# Patient Record
Sex: FEMALE | Race: WHITE | NOT HISPANIC OR LATINO | Employment: FULL TIME | URBAN - METROPOLITAN AREA
[De-identification: names, ages, dates, MRNs, and addresses within clinical notes are randomized per-mention and may not be internally consistent; named-entity substitution may affect disease eponyms.]

---

## 2022-08-30 ENCOUNTER — HOSPITAL ENCOUNTER (INPATIENT)
Facility: HOSPITAL | Age: 67
LOS: 6 days | Discharge: NURSING, REHAB OR LONG-TERM CARE | DRG: 064 | End: 2022-09-05
Attending: EMERGENCY MEDICINE | Admitting: INTERNAL MEDICINE
Payer: COMMERCIAL

## 2022-08-30 ENCOUNTER — APPOINTMENT (OUTPATIENT)
Dept: RADIOLOGY | Facility: HOSPITAL | Age: 67
DRG: 064 | End: 2022-08-30
Attending: EMERGENCY MEDICINE
Payer: COMMERCIAL

## 2022-08-30 ENCOUNTER — APPOINTMENT (OUTPATIENT)
Dept: CT IMAGING | Facility: HOSPITAL | Age: 67
DRG: 064 | End: 2022-08-30
Attending: STUDENT IN AN ORGANIZED HEALTH CARE EDUCATION/TRAINING PROGRAM
Payer: COMMERCIAL

## 2022-08-30 ENCOUNTER — APPOINTMENT (OUTPATIENT)
Dept: MRI IMAGING | Facility: HOSPITAL | Age: 67
DRG: 064 | End: 2022-08-30
Attending: EMERGENCY MEDICINE
Payer: COMMERCIAL

## 2022-08-30 DIAGNOSIS — I10 HYPERTENSION, UNSPECIFIED TYPE: Primary | ICD-10-CM

## 2022-08-30 DIAGNOSIS — N63.20 LEFT BREAST MASS: ICD-10-CM

## 2022-08-30 DIAGNOSIS — R29.90 STROKE-LIKE SYMPTOM: ICD-10-CM

## 2022-08-30 DIAGNOSIS — U07.1 INFECTION DUE TO 2019 NOVEL CORONAVIRUS: ICD-10-CM

## 2022-08-30 PROBLEM — I63.9 ACUTE ISCHEMIC STROKE (H): Status: ACTIVE | Noted: 2022-08-30

## 2022-08-30 LAB
ALBUMIN SERPL-MCNC: 4.5 G/DL (ref 3.5–5)
ALBUMIN UR-MCNC: 10 MG/DL
ALP SERPL-CCNC: 109 U/L (ref 45–120)
ALT SERPL W P-5'-P-CCNC: 24 U/L (ref 0–45)
ANION GAP SERPL CALCULATED.3IONS-SCNC: 11 MMOL/L (ref 5–18)
APPEARANCE UR: CLEAR
APTT PPP: 31 SECONDS (ref 22–38)
AST SERPL W P-5'-P-CCNC: 44 U/L (ref 0–40)
BACTERIA #/AREA URNS HPF: ABNORMAL /HPF
BASOPHILS # BLD AUTO: 0 10E3/UL (ref 0–0.2)
BASOPHILS NFR BLD AUTO: 0 %
BILIRUB DIRECT SERPL-MCNC: 0.1 MG/DL
BILIRUB SERPL-MCNC: 0.5 MG/DL (ref 0–1)
BILIRUB UR QL STRIP: NEGATIVE
BUN SERPL-MCNC: 13 MG/DL (ref 8–22)
CALCIUM SERPL-MCNC: 9.7 MG/DL (ref 8.5–10.5)
CHLORIDE BLD-SCNC: 103 MMOL/L (ref 98–107)
CHOLEST SERPL-MCNC: 281 MG/DL
CO2 SERPL-SCNC: 23 MMOL/L (ref 22–31)
COLOR UR AUTO: ABNORMAL
CREAT SERPL-MCNC: 1.01 MG/DL (ref 0.6–1.1)
EOSINOPHIL # BLD AUTO: 0 10E3/UL (ref 0–0.7)
EOSINOPHIL NFR BLD AUTO: 0 %
ERYTHROCYTE [DISTWIDTH] IN BLOOD BY AUTOMATED COUNT: 13.1 % (ref 10–15)
FLUAV RNA SPEC QL NAA+PROBE: NEGATIVE
FLUBV RNA RESP QL NAA+PROBE: NEGATIVE
GFR SERPL CREATININE-BSD FRML MDRD: 60 ML/MIN/1.73M2
GLUCOSE BLD-MCNC: 118 MG/DL (ref 70–125)
GLUCOSE BLDC GLUCOMTR-MCNC: 136 MG/DL (ref 70–99)
GLUCOSE UR STRIP-MCNC: NEGATIVE MG/DL
HBA1C MFR BLD: 5.7 %
HCT VFR BLD AUTO: 46 % (ref 35–47)
HDLC SERPL-MCNC: 48 MG/DL
HGB BLD-MCNC: 15.6 G/DL (ref 11.7–15.7)
HGB UR QL STRIP: ABNORMAL
HOLD SPECIMEN: NORMAL
IMM GRANULOCYTES # BLD: 0 10E3/UL
IMM GRANULOCYTES NFR BLD: 0 %
INR PPP: 1 (ref 0.85–1.15)
KETONES UR STRIP-MCNC: 10 MG/DL
LACTATE SERPL-SCNC: 1.2 MMOL/L (ref 0.7–2)
LDLC SERPL CALC-MCNC: 206 MG/DL
LEUKOCYTE ESTERASE UR QL STRIP: ABNORMAL
LYMPHOCYTES # BLD AUTO: 0.6 10E3/UL (ref 0.8–5.3)
LYMPHOCYTES NFR BLD AUTO: 6 %
MCH RBC QN AUTO: 28.9 PG (ref 26.5–33)
MCHC RBC AUTO-ENTMCNC: 33.9 G/DL (ref 31.5–36.5)
MCV RBC AUTO: 85 FL (ref 78–100)
MONOCYTES # BLD AUTO: 1.1 10E3/UL (ref 0–1.3)
MONOCYTES NFR BLD AUTO: 12 %
MUCOUS THREADS #/AREA URNS LPF: PRESENT /LPF
NEUTROPHILS # BLD AUTO: 7 10E3/UL (ref 1.6–8.3)
NEUTROPHILS NFR BLD AUTO: 82 %
NITRATE UR QL: NEGATIVE
NRBC # BLD AUTO: 0 10E3/UL
NRBC BLD AUTO-RTO: 0 /100
PH UR STRIP: 5.5 [PH] (ref 5–7)
PLATELET # BLD AUTO: 276 10E3/UL (ref 150–450)
POTASSIUM BLD-SCNC: 3.5 MMOL/L (ref 3.5–5)
PROT SERPL-MCNC: 7.8 G/DL (ref 6–8)
RBC # BLD AUTO: 5.4 10E6/UL (ref 3.8–5.2)
RBC URINE: 3 /HPF
RSV RNA SPEC NAA+PROBE: NEGATIVE
SARS-COV-2 RNA RESP QL NAA+PROBE: POSITIVE
SODIUM SERPL-SCNC: 137 MMOL/L (ref 136–145)
SP GR UR STRIP: 1.04 (ref 1–1.03)
SQUAMOUS EPITHELIAL: 1 /HPF
TRIGL SERPL-MCNC: 136 MG/DL
TROPONIN I SERPL-MCNC: 0.03 NG/ML (ref 0–0.29)
TSH SERPL DL<=0.005 MIU/L-ACNC: 0.77 UIU/ML (ref 0.3–5)
UROBILINOGEN UR STRIP-MCNC: <2 MG/DL
WBC # BLD AUTO: 8.7 10E3/UL (ref 4–11)
WBC URINE: 2 /HPF

## 2022-08-30 PROCEDURE — 36415 COLL VENOUS BLD VENIPUNCTURE: CPT | Performed by: EMERGENCY MEDICINE

## 2022-08-30 PROCEDURE — 85730 THROMBOPLASTIN TIME PARTIAL: CPT | Performed by: EMERGENCY MEDICINE

## 2022-08-30 PROCEDURE — 250N000011 HC RX IP 250 OP 636: Performed by: EMERGENCY MEDICINE

## 2022-08-30 PROCEDURE — 99285 EMERGENCY DEPT VISIT HI MDM: CPT | Mod: 25

## 2022-08-30 PROCEDURE — 83605 ASSAY OF LACTIC ACID: CPT | Performed by: EMERGENCY MEDICINE

## 2022-08-30 PROCEDURE — 99223 1ST HOSP IP/OBS HIGH 75: CPT | Mod: AI | Performed by: INTERNAL MEDICINE

## 2022-08-30 PROCEDURE — G0425 INPT/ED TELECONSULT30: HCPCS | Mod: G0 | Performed by: NURSE PRACTITIONER

## 2022-08-30 PROCEDURE — 250N000013 HC RX MED GY IP 250 OP 250 PS 637: Performed by: EMERGENCY MEDICINE

## 2022-08-30 PROCEDURE — 84484 ASSAY OF TROPONIN QUANT: CPT | Performed by: EMERGENCY MEDICINE

## 2022-08-30 PROCEDURE — 84443 ASSAY THYROID STIM HORMONE: CPT | Performed by: EMERGENCY MEDICINE

## 2022-08-30 PROCEDURE — 85610 PROTHROMBIN TIME: CPT | Performed by: EMERGENCY MEDICINE

## 2022-08-30 PROCEDURE — 93005 ELECTROCARDIOGRAM TRACING: CPT | Performed by: EMERGENCY MEDICINE

## 2022-08-30 PROCEDURE — 80053 COMPREHEN METABOLIC PANEL: CPT | Performed by: EMERGENCY MEDICINE

## 2022-08-30 PROCEDURE — C9803 HOPD COVID-19 SPEC COLLECT: HCPCS

## 2022-08-30 PROCEDURE — 80061 LIPID PANEL: CPT | Performed by: INTERNAL MEDICINE

## 2022-08-30 PROCEDURE — 81001 URINALYSIS AUTO W/SCOPE: CPT | Performed by: EMERGENCY MEDICINE

## 2022-08-30 PROCEDURE — 70496 CT ANGIOGRAPHY HEAD: CPT

## 2022-08-30 PROCEDURE — 255N000002 HC RX 255 OP 636: Performed by: EMERGENCY MEDICINE

## 2022-08-30 PROCEDURE — 71045 X-RAY EXAM CHEST 1 VIEW: CPT

## 2022-08-30 PROCEDURE — 83036 HEMOGLOBIN GLYCOSYLATED A1C: CPT | Performed by: INTERNAL MEDICINE

## 2022-08-30 PROCEDURE — 70498 CT ANGIOGRAPHY NECK: CPT

## 2022-08-30 PROCEDURE — 120N000001 HC R&B MED SURG/OB

## 2022-08-30 PROCEDURE — 70553 MRI BRAIN STEM W/O & W/DYE: CPT

## 2022-08-30 PROCEDURE — 85025 COMPLETE CBC W/AUTO DIFF WBC: CPT | Performed by: EMERGENCY MEDICINE

## 2022-08-30 PROCEDURE — 87637 SARSCOV2&INF A&B&RSV AMP PRB: CPT | Performed by: EMERGENCY MEDICINE

## 2022-08-30 PROCEDURE — 82248 BILIRUBIN DIRECT: CPT | Performed by: INTERNAL MEDICINE

## 2022-08-30 PROCEDURE — A9585 GADOBUTROL INJECTION: HCPCS | Performed by: EMERGENCY MEDICINE

## 2022-08-30 RX ORDER — GADOBUTROL 604.72 MG/ML
7 INJECTION INTRAVENOUS ONCE
Status: COMPLETED | OUTPATIENT
Start: 2022-08-30 | End: 2022-08-30

## 2022-08-30 RX ORDER — ACETAMINOPHEN 650 MG/1
650 SUPPOSITORY RECTAL EVERY 4 HOURS PRN
Status: DISCONTINUED | OUTPATIENT
Start: 2022-08-30 | End: 2022-08-31

## 2022-08-30 RX ORDER — LABETALOL HYDROCHLORIDE 5 MG/ML
10 INJECTION, SOLUTION INTRAVENOUS ONCE
Status: DISCONTINUED | OUTPATIENT
Start: 2022-08-30 | End: 2022-09-05 | Stop reason: HOSPADM

## 2022-08-30 RX ORDER — LABETALOL HYDROCHLORIDE 5 MG/ML
10-20 INJECTION, SOLUTION INTRAVENOUS EVERY 10 MIN PRN
Status: DISCONTINUED | OUTPATIENT
Start: 2022-08-30 | End: 2022-09-05 | Stop reason: HOSPADM

## 2022-08-30 RX ORDER — IOPAMIDOL 755 MG/ML
75 INJECTION, SOLUTION INTRAVASCULAR ONCE
Status: COMPLETED | OUTPATIENT
Start: 2022-08-30 | End: 2022-08-30

## 2022-08-30 RX ORDER — HEPARIN SODIUM 5000 [USP'U]/.5ML
5000 INJECTION, SOLUTION INTRAVENOUS; SUBCUTANEOUS EVERY 12 HOURS
Status: DISCONTINUED | OUTPATIENT
Start: 2022-08-31 | End: 2022-09-05 | Stop reason: HOSPADM

## 2022-08-30 RX ORDER — HYDRALAZINE HYDROCHLORIDE 20 MG/ML
10-20 INJECTION INTRAMUSCULAR; INTRAVENOUS
Status: DISCONTINUED | OUTPATIENT
Start: 2022-08-30 | End: 2022-09-05 | Stop reason: HOSPADM

## 2022-08-30 RX ORDER — CLOPIDOGREL BISULFATE 75 MG/1
300 TABLET ORAL ONCE
Status: COMPLETED | OUTPATIENT
Start: 2022-08-30 | End: 2022-08-30

## 2022-08-30 RX ORDER — ATORVASTATIN CALCIUM 40 MG/1
80 TABLET, FILM COATED ORAL EVERY EVENING
Status: DISCONTINUED | OUTPATIENT
Start: 2022-08-30 | End: 2022-09-05 | Stop reason: HOSPADM

## 2022-08-30 RX ORDER — ASPIRIN 81 MG/1
81 TABLET ORAL DAILY
Status: DISCONTINUED | OUTPATIENT
Start: 2022-08-31 | End: 2022-09-05 | Stop reason: HOSPADM

## 2022-08-30 RX ORDER — LORAZEPAM 2 MG/ML
1 INJECTION INTRAMUSCULAR ONCE
Status: COMPLETED | OUTPATIENT
Start: 2022-08-30 | End: 2022-08-30

## 2022-08-30 RX ORDER — ASPIRIN 325 MG
325 TABLET ORAL ONCE
Status: COMPLETED | OUTPATIENT
Start: 2022-08-30 | End: 2022-08-30

## 2022-08-30 RX ORDER — CLOPIDOGREL BISULFATE 75 MG/1
75 TABLET ORAL DAILY
Status: DISCONTINUED | OUTPATIENT
Start: 2022-08-31 | End: 2022-09-05 | Stop reason: HOSPADM

## 2022-08-30 RX ADMIN — IOPAMIDOL 75 ML: 755 INJECTION, SOLUTION INTRAVENOUS at 14:24

## 2022-08-30 RX ADMIN — LORAZEPAM 1 MG: 2 INJECTION INTRAMUSCULAR; INTRAVENOUS at 19:24

## 2022-08-30 RX ADMIN — CLOPIDOGREL BISULFATE 300 MG: 75 TABLET ORAL at 15:46

## 2022-08-30 RX ADMIN — ASPIRIN 325 MG: 325 TABLET ORAL at 15:53

## 2022-08-30 RX ADMIN — GADOBUTROL 7 ML: 604.72 INJECTION INTRAVENOUS at 20:48

## 2022-08-30 ASSESSMENT — ENCOUNTER SYMPTOMS
SHORTNESS OF BREATH: 0
DIARRHEA: 0
FATIGUE: 1
SORE THROAT: 0
COUGH: 0
NUMBNESS: 1
SPEECH DIFFICULTY: 1
VOMITING: 0
NAUSEA: 1
FEVER: 1
CONFUSION: 0
HEADACHES: 0
WOUND: 0
ABDOMINAL PAIN: 0
WEAKNESS: 1
NECK PAIN: 0

## 2022-08-30 ASSESSMENT — ACTIVITIES OF DAILY LIVING (ADL)
ADLS_ACUITY_SCORE: 35

## 2022-08-30 NOTE — ED TRIAGE NOTES
"Patient presents with new speech issues, including slurred speech and word finding issues, and increased balance issues this morning starting around 0900. Patient reports history of vertigo and has baseline balance and dizziness issues that were present yesterday as well as not \"feeling well\" yesterday. Also reports left hand tingling. Not on blood thinners. Patient did display left upper and lower extremity weakness with provider examination.     "

## 2022-08-30 NOTE — ED PROVIDER NOTES
EMERGENCY DEPARTMENT ENCOUNTER      NAME: Stephani Garcia  AGE: 67 year old female  YOB: 1955  MRN: [id number]  EVALUATION DATE & TIME: No admission date for patient encounter.    PCP: No primary care provider on file.    ED PROVIDER: Ramses Beltre MD        Chief Complaint   Patient presents with     Stroke Symptoms     Tier 2 Stroke Code         FINAL IMPRESSION:  1. Stroke-like symptom    2. Infection due to 2019 novel coronavirus    3. Left breast mass          ED COURSE & MEDICAL DECISION MAKING:    Pertinent Labs & Imaging studies reviewed. (See chart for details)  67 year old female presents to the Emergency Department for evaluation of strokelike symptoms        ED Course as of 08/30/22 2229   Tue Aug 30, 2022   1707 Patient presents with strokelike symptoms.  Also considered seizure, hypoglycemia, hyponatremia, conversion, metabolic encephalopathy   1707 Tier 2 stroke code.  Unclear on exact timing but sound like symptoms might of started last night and are worse this morning.  Not a tenecteplase candidate.   1708 Spoke with stroke neuro and neuroradiology.   1708 Stroke code canceled.  Plan for admission for likely stroke.  Aspirin and Plavix load.   1708 Sinus tachycardia.   1708 Sepsis unlikely.  We will add lactic acid on.  Chest x-ray negative.  No chest pain or shortness of breath suggest PE.   1708 WBC: 8.7   1708 TSH added on   1708 Spoke with hospitalist who agreed to admit to stroke neuro   1708 Sodium: 137   1708 Troponin I: 0.03   1726 I updated patient's son in the lobby.   1726 Nursing reports passed bedside swallow.  Regular diet ordered   1726 Lactic Acid: 1.2   1814 I updated charge nurse and patient's son in the lobby that patient has COVID-19   1814 Nursing states patient is requesting anxiety medicine prior to MRI, ativan ordered   2228 Left purple breast mass noted.  Discussed with patient states been present x4 months.     2:09 PM Tier 2 stroke code called in triage, so I  met with the patient in the waiting room to gather initial history and to perform my initial exam. I discussed the plan for care while in the Emergency Department. PPE (N-95 mask, gloves, and face shield) was worn during patient encounters.   2:27 PM I spoke with stroke neurology, and we discussed patient case.  2:40 PM I spoke with neurology radiology regarding the patient.  2:54 PM I spoke with stroke neurology who recommend 300 mg of Plavix, 325 mg of Asprin, and stroke neuro- plavix 300, 325 asprin, admission, and MRI to be performed as inpatient.  2:57 PM I returned to the patient to continue gathering HPI and complete further physical exam.  4:36 PM I updated the patient on lab and imaging results.  4:43 PM I discussed the case with hospitalist, Dr. Hamiltno, who accepts the patient for admission.      At the conclusion of the encounter I discussed the results of all of the tests and the disposition. The questions were answered. The patient or family acknowledged understanding and was agreeable with the care plan.       Critical Care  Performed by: Caleb Beltre MD  Total critical care time: 30 minutes  Critical care time was exclusive of separately billable procedures and treating other patients.  Critical care was necessary to treat or prevent imminent or life-threatening deterioration of the following conditions: stroke code  Critical care was time spent personally by me on the following activities: development of treatment plan with patient or surrogate, discussions with consultants, examination of patient, evaluation of patient's response to treatment, obtaining history from patient or surrogate, ordering and performing treatments and interventions, ordering and review of laboratory studies, ordering and review of radiographic studies and re-evaluation of patient's condition, this excludes any separately billable procedures.      MEDICATIONS GIVEN IN THE EMERGENCY:  Medications   labetalol  "(NORMODYNE/TRANDATE) injection 10 mg (has no administration in time range)   iopamidol (ISOVUE-370) solution 75 mL (75 mLs Intravenous Given 8/30/22 1424)   aspirin (ASA) tablet 325 mg (325 mg Oral Given 8/30/22 1553)   clopidogrel (PLAVIX) tablet 300 mg (300 mg Oral Given 8/30/22 1546)   LORazepam (ATIVAN) injection 1 mg (1 mg Intravenous Given 8/30/22 1924)   gadobutrol (GADAVIST) injection 7 mL (7 mLs Intravenous Given 8/30/22 2048)       NEW PRESCRIPTIONS STARTED AT TODAY'S ER VISIT  New Prescriptions    No medications on file          =================================================================    HPI    Triage note  \"Patient presents with new speech issues, including slurred speech and word finding issues, and increased balance issues this morning starting around 0900. Patient reports history of vertigo and has baseline balance and dizziness issues that were present yesterday as well as not \"feeling well\" yesterday. Also reports left hand tingling. Not on blood thinners.     \"      Patient information was obtained from: patient and patient's son    Use of : N/A     History limited secondary to patient having a difficult time articulating history    Stephani Garcia is a 67 year old female with a pertinent history of vertigo, hyperlipidemia, and HTN who presents to this ED by private vehicle with son for evaluation of left-sided weakness. Patient reports that she started feeling \"off\" yesterday ~1600. Patient states that she was feeling tired and slightly clumsy yesterday evening. Patient went to sleep at ~2200, and woke up this morning with left-sided weakness and numbness. When patient woke up this morning, at ~0900 (5.5 hours ago) her  noticed that she had slurred speech and difficulty formulating her thoughts into sentences.     Patient also reports that she was recent ly sick for ~3 days. She states that she was fatigued, nauseated, had nasal congestion, and felt feverish. " "    Patient denies history of smoking or daily alcohol use. Patient denies use of blood thinners. Patient denies bloody stools, abdominal pain, chest pain, shortness of breath, sore throat, cough, emesis, diarrhea, headache, blurred vision, chest pain, shortness of breath, or additional medical concerns or complaints at this time.     Of note, patient reports that she is left-hand dominant.      REVIEW OF SYSTEMS   Review of Systems   Constitutional: Positive for fatigue and fever (subjective, resolved).   HENT: Positive for congestion. Negative for sore throat.    Eyes: Negative for visual disturbance.   Respiratory: Negative for cough and shortness of breath.    Cardiovascular: Negative for chest pain.   Gastrointestinal: Positive for nausea (resolved). Negative for abdominal pain, diarrhea and vomiting.   Musculoskeletal: Negative for neck pain.   Skin: Negative for wound.   Neurological: Positive for speech difficulty, weakness (left sided) and numbness (left sided). Negative for headaches.        Positive for increased clumsiness    Psychiatric/Behavioral: Negative for confusion.       PAST MEDICAL HISTORY:  Past Medical History:   Diagnosis Date     Hyperlipidemia 2009     Hypertension 2009     GILDA (obstructive sleep apnea) 2011    Formatting of this note might be different from the original. Split-Night Polysomnogram Interpretation  Date of read:  2011  Estimated Body mass index is 26.94 kg/(m^2) as calculated from the following:   Height as of 11: 5' 7\"(1.702 m).   Weight as of 11: 172 lb(78.019 kg). Total [place] Score: (not recorded) Neck Circumference (in inches): 13   Baseline: This study was performed in       PAST SURGICAL HISTORY:  Past Surgical History:   Procedure Laterality Date      SECTION             CURRENT MEDICATIONS:    No current outpatient medications on file.      ALLERGIES:  Allergies   Allergen Reactions     Sulfa Drugs Headache     Tetracyclines " Hives     Occurred many years ago.        FAMILY HISTORY:  Family History   Problem Relation Age of Onset     Ovarian Cancer Mother      Lung Cancer Mother      Cataracts Father        SOCIAL HISTORY:   Social History     Socioeconomic History     Marital status:        VITALS:  BP (!) 216/100   Pulse 117   Temp 98.6  F (37  C) (Oral)   Resp 20   SpO2 96%     PHYSICAL EXAM      Vitals: BP (!) 216/100   Pulse 117   Temp 98.6  F (37  C) (Oral)   Resp 20   SpO2 96%   General: Appears in no acute distress, awake, alert, interactive.  Eyes: Conjunctivae non-injected. Sclera anicteric. Strabismus of left eye.  HENT: Atraumatic.  Neck: Supple.  Respiratory/Chest: Respiration unlabored.  Decreased breath sounds on left side.  Heart: tachycardia  Abdomen: non distended  Musculoskeletal: Normal extremities. No edema or erythema.  Skin: Normal color. No rash or diaphoresis.  Neurologic: Face symmetric, moves all extremities spontaneously.  Slightly slurred speech, weakness to the left arm and left leg.  Psychiatric: Oriented to person, place, and time. Affect appropriate.       LAB:  All pertinent labs reviewed and interpreted.  Results for orders placed or performed during the hospital encounter of 08/30/22   CTA Head Neck with Contrast    Impression    IMPRESSION:    HEAD CT:  1. No acute intracranial abnormality.    HEAD CTA:  1. Variant anatomy, otherwise unremarkable intracranial vasculature.    NECK CTA:  1. 30% stenosis of the right ICA origin.    - - - - - - - - - - - - - - - - - - - - - - - - - - - - - - - - - - - - - - - - - - - - - - - - - - - - - - - - - - - - - - - - - - - - - - - - - - - -   The results above were discussed with Dr. Beltre on 8/30/2022 2:39 PM by Dr. Jabari Sams.  - - - - - - - - - - - - - - - - - - - - - - - - - - - - - - - - - - - - - - - - - - - - - - - - - - - - - - - - - - - - - - - - - - - - - - - - - - - -    XR Chest Port 1 View    Impression    IMPRESSION: Lungs are  clear. Heart and pulmonary vascularity are normal. No signs of acute disease.   MR Brain w/o & w Contrast    Impression    IMPRESSION:  1.  Cluster of small recent ischemic infarcts involving the deep white matter of the right frontal lobe and right basal ganglia.  2.  No other recent infarcts.  3.  Age-related changes.   Basic metabolic panel   Result Value Ref Range    Sodium 137 136 - 145 mmol/L    Potassium 3.5 3.5 - 5.0 mmol/L    Chloride 103 98 - 107 mmol/L    Carbon Dioxide (CO2) 23 22 - 31 mmol/L    Anion Gap 11 5 - 18 mmol/L    Urea Nitrogen 13 8 - 22 mg/dL    Creatinine 1.01 0.60 - 1.10 mg/dL    Calcium 9.7 8.5 - 10.5 mg/dL    Glucose 118 70 - 125 mg/dL    GFR Estimate 60 (L) >60 mL/min/1.73m2   Result Value Ref Range    INR 1.00 0.85 - 1.15   Partial thromboplastin time   Result Value Ref Range    aPTT 31 22 - 38 Seconds   Result Value Ref Range    Troponin I 0.03 0.00 - 0.29 ng/mL   Glucose by meter   Result Value Ref Range    GLUCOSE BY METER POCT 136 (H) 70 - 99 mg/dL   Symptomatic; Unknown Influenza A/B & SARS-CoV2 (COVID-19) Virus PCR Multiplex Nasopharyngeal    Specimen: Nasopharyngeal; Swab   Result Value Ref Range    Influenza A PCR Negative Negative    Influenza B PCR Negative Negative    RSV PCR Negative Negative    SARS CoV2 PCR Positive (A) Negative   CBC with platelets and differential   Result Value Ref Range    WBC Count 8.7 4.0 - 11.0 10e3/uL    RBC Count 5.40 (H) 3.80 - 5.20 10e6/uL    Hemoglobin 15.6 11.7 - 15.7 g/dL    Hematocrit 46.0 35.0 - 47.0 %    MCV 85 78 - 100 fL    MCH 28.9 26.5 - 33.0 pg    MCHC 33.9 31.5 - 36.5 g/dL    RDW 13.1 10.0 - 15.0 %    Platelet Count 276 150 - 450 10e3/uL    % Neutrophils 82 %    % Lymphocytes 6 %    % Monocytes 12 %    % Eosinophils 0 %    % Basophils 0 %    % Immature Granulocytes 0 %    NRBCs per 100 WBC 0 <1 /100    Absolute Neutrophils 7.0 1.6 - 8.3 10e3/uL    Absolute Lymphocytes 0.6 (L) 0.8 - 5.3 10e3/uL    Absolute Monocytes 1.1 0.0 - 1.3  10e3/uL    Absolute Eosinophils 0.0 0.0 - 0.7 10e3/uL    Absolute Basophils 0.0 0.0 - 0.2 10e3/uL    Absolute Immature Granulocytes 0.0 <=0.4 10e3/uL    Absolute NRBCs 0.0 10e3/uL   Extra Red Top Tube   Result Value Ref Range    Hold Specimen JIC    Lactic acid whole blood   Result Value Ref Range    Lactic Acid 1.2 0.7 - 2.0 mmol/L   TSH with free T4 reflex   Result Value Ref Range    TSH 0.77 0.30 - 5.00 uIU/mL   UA with Microscopic reflex to Culture    Specimen: Urine, NOS   Result Value Ref Range    Color Urine Light Yellow Colorless, Straw, Light Yellow, Yellow    Appearance Urine Clear Clear    Glucose Urine Negative Negative mg/dL    Bilirubin Urine Negative Negative    Ketones Urine 10  (A) Negative mg/dL    Specific Gravity Urine 1.044 (H) 1.001 - 1.030    Blood Urine 0.03 mg/dL (A) Negative    pH Urine 5.5 5.0 - 7.0    Protein Albumin Urine 10  (A) Negative mg/dL    Urobilinogen Urine <2.0 <2.0 mg/dL    Nitrite Urine Negative Negative    Leukocyte Esterase Urine 25 Alexia/uL (A) Negative    Bacteria Urine Few (A) None Seen /HPF    Mucus Urine Present (A) None Seen /LPF    RBC Urine 3 (H) <=2 /HPF    WBC Urine 2 <=5 /HPF    Squamous Epithelials Urine 1 <=1 /HPF       RADIOLOGY:  Reviewed all pertinent imaging. Please see official radiology report.  MR Brain w/o & w Contrast   Final Result   IMPRESSION:   1.  Cluster of small recent ischemic infarcts involving the deep white matter of the right frontal lobe and right basal ganglia.   2.  No other recent infarcts.   3.  Age-related changes.      XR Chest Port 1 View   Final Result   IMPRESSION: Lungs are clear. Heart and pulmonary vascularity are normal. No signs of acute disease.      CTA Head Neck with Contrast   Final Result   IMPRESSION:      HEAD CT:   1. No acute intracranial abnormality.      HEAD CTA:   1. Variant anatomy, otherwise unremarkable intracranial vasculature.      NECK CTA:   1. 30% stenosis of the right ICA origin.      - - - - - - - - - - -  - - - - - - - - - - - - - - - - - - - - - - - - - - - - - - - - - - - - - - - - - - - - - - - - - - - - - - - - - - - - - - - - -    The results above were discussed with Dr. Beltre on 8/30/2022 2:39 PM by Dr. Jabari Sams.   - - - - - - - - - - - - - - - - - - - - - - - - - - - - - - - - - - - - - - - - - - - - - - - - - - - - - - - - - - - - - - - - - - - - - - - - - - - -           EKG:    Performed at: 1630    Impression: Possible left atrial enlargement.     Rate: 110  Rhythm: Sinus tachycardia.  Axis: 17  MD Interval: 152  QRS Interval: 90  QTc Interval: 473  ST Changes: None  Comparison: No previous EKG for comparison    I have independently reviewed and interpreted the EKG(s) documented above.    PROCEDURES:   none      I, Elise Gryler, am serving as a scribe to document services personally performed by Caleb Beltre MD based on my observation and the provider's statements to me. I, Dr. Caleb Beltre, attest that Elise Gryler is acting in a scribe capacity, has observed my performance of the services and has documented them in accordance with my direction.    Caleb Beltre MD  Emergency Medicine  [facility] EMERGENCY DEPARTMENT  [address] [place] [id number] [phone]       Caleb Beltre MD  08/30/22 1727       Caleb Beltre MD  08/30/22 181       Caleb Beltre MD  08/30/22 2126       Caleb Beltre MD  08/30/22 6434

## 2022-08-30 NOTE — ED PROVIDER NOTES
EMERGENCY DEPARTMENT ENCOUNTER      NAME: Stephani Garcia  AGE: 67 year old female  YOB: 1955  MRN: [id number]  EVALUATION DATE & TIME: No admission date for patient encounter.    PCP: No primary care provider on file.    ED PROVIDER: Ramses Beltre MD        Chief Complaint   Patient presents with     Stroke Symptoms     Tier 2 Stroke Code         FINAL IMPRESSION:  1. Stroke-like symptom    2. Infection due to 2019 novel coronavirus          ED COURSE & MEDICAL DECISION MAKING:    Pertinent Labs & Imaging studies reviewed. (See chart for details)  67 year old female presents to the Emergency Department for evaluation of strokelike symptoms        ED Course as of 08/30/22 2129   Tue Aug 30, 2022   1707 Patient presents with strokelike symptoms.  Also considered seizure, hypoglycemia, hyponatremia, conversion, metabolic encephalopathy   1707 Tier 2 stroke code.  Unclear on exact timing but sound like symptoms might of started last night and are worse this morning.  Not a tenecteplase candidate.   1708 Spoke with stroke neuro and neuroradiology.   1708 Stroke code canceled.  Plan for admission for likely stroke.  Aspirin and Plavix load.   1708 Sinus tachycardia.   1708 Sepsis unlikely.  We will add lactic acid on.  Chest x-ray negative.  No chest pain or shortness of breath suggest PE.   1708 WBC: 8.7   1708 TSH added on   1708 Spoke with hospitalist who agreed to admit to stroke neuro   1708 Sodium: 137   1708 Troponin I: 0.03   1726 I updated patient's son in the lobby.   1726 Nursing reports passed bedside swallow.  Regular diet ordered   1726 Lactic Acid: 1.2   1814 I updated charge nurse and patient's son in the lobby that patient has COVID-19   1814 Nursing states patient is requesting anxiety medicine prior to MRI, ativan ordered     2:09 PM Tier 2 stroke code called in triage, so I met with the patient in the waiting room to gather initial history and to perform my initial exam. I discussed the  plan for care while in the Emergency Department. PPE (N-95 mask, gloves, and face shield) was worn during patient encounters.   2:27 PM I spoke with stroke neurology, and we discussed patient case.  2:40 PM I spoke with neurology radiology regarding the patient.  2:54 PM I spoke with stroke neurology who recommend 300 mg of Plavix, 325 mg of Asprin, and stroke neuro- plavix 300, 325 asprin, admission, and MRI to be performed as inpatient.  2:57 PM I returned to the patient to continue gathering HPI and complete further physical exam.  4:36 PM I updated the patient on lab and imaging results.  4:43 PM I discussed the case with hospitalist, Dr. Hamilton, who accepts the patient for admission.      At the conclusion of the encounter I discussed the results of all of the tests and the disposition. The questions were answered. The patient or family acknowledged understanding and was agreeable with the care plan.       Critical Care  Performed by: Caleb Beltre MD  Total critical care time: 30 minutes  Critical care time was exclusive of separately billable procedures and treating other patients.  Critical care was necessary to treat or prevent imminent or life-threatening deterioration of the following conditions: stroke code  Critical care was time spent personally by me on the following activities: development of treatment plan with patient or surrogate, discussions with consultants, examination of patient, evaluation of patient's response to treatment, obtaining history from patient or surrogate, ordering and performing treatments and interventions, ordering and review of laboratory studies, ordering and review of radiographic studies and re-evaluation of patient's condition, this excludes any separately billable procedures.      MEDICATIONS GIVEN IN THE EMERGENCY:  Medications   iopamidol (ISOVUE-370) solution 75 mL (75 mLs Intravenous Given 8/30/22 2422)   aspirin (ASA) tablet 325 mg (325 mg Oral Given 8/30/22 4273)  "  clopidogrel (PLAVIX) tablet 300 mg (300 mg Oral Given 8/30/22 1546)   LORazepam (ATIVAN) injection 1 mg (1 mg Intravenous Given 8/30/22 1924)   gadobutrol (GADAVIST) injection 7 mL (7 mLs Intravenous Given 8/30/22 2048)       NEW PRESCRIPTIONS STARTED AT TODAY'S ER VISIT  New Prescriptions    No medications on file          =================================================================    HPI    Triage note  \"Patient presents with new speech issues, including slurred speech and word finding issues, and increased balance issues this morning starting around 0900. Patient reports history of vertigo and has baseline balance and dizziness issues that were present yesterday as well as not \"feeling well\" yesterday. Also reports left hand tingling. Not on blood thinners.     \"      Patient information was obtained from: patient and patient's son    Use of : N/A     History limited secondary to patient having a difficult time articulating history    Stephani Garcia is a 67 year old female with a pertinent history of vertigo, hyperlipidemia, and HTN who presents to this ED by private vehicle with son for evaluation of left-sided weakness. Patient reports that she started feeling \"off\" yesterday ~1600. Patient states that she was feeling tired and slightly clumsy yesterday evening. Patient went to sleep at ~2200, and woke up this morning with left-sided weakness and numbness. When patient woke up this morning, at ~0900 (5.5 hours ago) her  noticed that she had slurred speech and difficulty formulating her thoughts into sentences.     Patient also reports that she was recent ly sick for ~3 days. She states that she was fatigued, nauseated, had nasal congestion, and felt feverish.     Patient denies history of smoking or daily alcohol use. Patient denies use of blood thinners. Patient denies bloody stools, abdominal pain, chest pain, shortness of breath, sore throat, cough, emesis, diarrhea, headache, " "blurred vision, chest pain, shortness of breath, or additional medical concerns or complaints at this time.     Of note, patient reports that she is left-hand dominant.      REVIEW OF SYSTEMS   Review of Systems   Constitutional: Positive for fatigue and fever (subjective, resolved).   HENT: Positive for congestion. Negative for sore throat.    Eyes: Negative for visual disturbance.   Respiratory: Negative for cough and shortness of breath.    Cardiovascular: Negative for chest pain.   Gastrointestinal: Positive for nausea (resolved). Negative for abdominal pain, diarrhea and vomiting.   Musculoskeletal: Negative for neck pain.   Skin: Negative for wound.   Neurological: Positive for speech difficulty, weakness (left sided) and numbness (left sided). Negative for headaches.        Positive for increased clumsiness    Psychiatric/Behavioral: Negative for confusion.       PAST MEDICAL HISTORY:  Past Medical History:   Diagnosis Date     Hyperlipidemia 2009     Hypertension 2009     GILDA (obstructive sleep apnea) 2011    Formatting of this note might be different from the original. Split-Night Polysomnogram Interpretation  Date of read:  2011  Estimated Body mass index is 26.94 kg/(m^2) as calculated from the following:   Height as of 11: 5' 7\"(1.702 m).   Weight as of 11: 172 lb(78.019 kg). Total Chaffee Score: (not recorded) Neck Circumference (in inches): 13   Baseline: This study was performed in       PAST SURGICAL HISTORY:  Past Surgical History:   Procedure Laterality Date      SECTION             CURRENT MEDICATIONS:    No current outpatient medications on file.      ALLERGIES:  Allergies   Allergen Reactions     Sulfa Drugs Headache     Tetracyclines Hives     Occurred many years ago.        FAMILY HISTORY:  Family History   Problem Relation Age of Onset     Ovarian Cancer Mother      Lung Cancer Mother      Cataracts Father        SOCIAL HISTORY:   Social History "     Socioeconomic History     Marital status:        VITALS:  BP (!) 216/100   Pulse 117   Temp 98.6  F (37  C) (Oral)   Resp 20   SpO2 96%     PHYSICAL EXAM      Vitals: BP (!) 216/100   Pulse 117   Temp 98.6  F (37  C) (Oral)   Resp 20   SpO2 96%   General: Appears in no acute distress, awake, alert, interactive.  Eyes: Conjunctivae non-injected. Sclera anicteric. Strabismus of left eye.  HENT: Atraumatic.  Neck: Supple.  Respiratory/Chest: Respiration unlabored.  Decreased breath sounds on left side.  Heart: tachycardia  Abdomen: non distended  Musculoskeletal: Normal extremities. No edema or erythema.  Skin: Normal color. No rash or diaphoresis.  Neurologic: Face symmetric, moves all extremities spontaneously.  Slightly slurred speech, weakness to the left arm and left leg.  Psychiatric: Oriented to person, place, and time. Affect appropriate.       LAB:  All pertinent labs reviewed and interpreted.  Results for orders placed or performed during the hospital encounter of 08/30/22   CTA Head Neck with Contrast    Impression    IMPRESSION:    HEAD CT:  1. No acute intracranial abnormality.    HEAD CTA:  1. Variant anatomy, otherwise unremarkable intracranial vasculature.    NECK CTA:  1. 30% stenosis of the right ICA origin.    - - - - - - - - - - - - - - - - - - - - - - - - - - - - - - - - - - - - - - - - - - - - - - - - - - - - - - - - - - - - - - - - - - - - - - - - - - - -   The results above were discussed with Dr. Beltre on 8/30/2022 2:39 PM by Dr. Jabari Sams.  - - - - - - - - - - - - - - - - - - - - - - - - - - - - - - - - - - - - - - - - - - - - - - - - - - - - - - - - - - - - - - - - - - - - - - - - - - - -    XR Chest Port 1 View    Impression    IMPRESSION: Lungs are clear. Heart and pulmonary vascularity are normal. No signs of acute disease.   MR Brain w/o & w Contrast    Impression    IMPRESSION:  1.  Cluster of small recent ischemic infarcts involving the deep white matter of the  right frontal lobe and right basal ganglia.  2.  No other recent infarcts.  3.  Age-related changes.   Basic metabolic panel   Result Value Ref Range    Sodium 137 136 - 145 mmol/L    Potassium 3.5 3.5 - 5.0 mmol/L    Chloride 103 98 - 107 mmol/L    Carbon Dioxide (CO2) 23 22 - 31 mmol/L    Anion Gap 11 5 - 18 mmol/L    Urea Nitrogen 13 8 - 22 mg/dL    Creatinine 1.01 0.60 - 1.10 mg/dL    Calcium 9.7 8.5 - 10.5 mg/dL    Glucose 118 70 - 125 mg/dL    GFR Estimate 60 (L) >60 mL/min/1.73m2   Result Value Ref Range    INR 1.00 0.85 - 1.15   Partial thromboplastin time   Result Value Ref Range    aPTT 31 22 - 38 Seconds   Result Value Ref Range    Troponin I 0.03 0.00 - 0.29 ng/mL   Glucose by meter   Result Value Ref Range    GLUCOSE BY METER POCT 136 (H) 70 - 99 mg/dL   Symptomatic; Unknown Influenza A/B & SARS-CoV2 (COVID-19) Virus PCR Multiplex Nasopharyngeal    Specimen: Nasopharyngeal; Swab   Result Value Ref Range    Influenza A PCR Negative Negative    Influenza B PCR Negative Negative    RSV PCR Negative Negative    SARS CoV2 PCR Positive (A) Negative   CBC with platelets and differential   Result Value Ref Range    WBC Count 8.7 4.0 - 11.0 10e3/uL    RBC Count 5.40 (H) 3.80 - 5.20 10e6/uL    Hemoglobin 15.6 11.7 - 15.7 g/dL    Hematocrit 46.0 35.0 - 47.0 %    MCV 85 78 - 100 fL    MCH 28.9 26.5 - 33.0 pg    MCHC 33.9 31.5 - 36.5 g/dL    RDW 13.1 10.0 - 15.0 %    Platelet Count 276 150 - 450 10e3/uL    % Neutrophils 82 %    % Lymphocytes 6 %    % Monocytes 12 %    % Eosinophils 0 %    % Basophils 0 %    % Immature Granulocytes 0 %    NRBCs per 100 WBC 0 <1 /100    Absolute Neutrophils 7.0 1.6 - 8.3 10e3/uL    Absolute Lymphocytes 0.6 (L) 0.8 - 5.3 10e3/uL    Absolute Monocytes 1.1 0.0 - 1.3 10e3/uL    Absolute Eosinophils 0.0 0.0 - 0.7 10e3/uL    Absolute Basophils 0.0 0.0 - 0.2 10e3/uL    Absolute Immature Granulocytes 0.0 <=0.4 10e3/uL    Absolute NRBCs 0.0 10e3/uL   Extra Red Top Tube   Result Value Ref Range     Hold Specimen [facility]    Lactic acid whole blood   Result Value Ref Range    Lactic Acid 1.2 0.7 - 2.0 mmol/L   TSH with free T4 reflex   Result Value Ref Range    TSH 0.77 0.30 - 5.00 uIU/mL   UA with Microscopic reflex to Culture    Specimen: Urine, NOS   Result Value Ref Range    Color Urine Light Yellow Colorless, Straw, Light Yellow, Yellow    Appearance Urine Clear Clear    Glucose Urine Negative Negative mg/dL    Bilirubin Urine Negative Negative    Ketones Urine 10  (A) Negative mg/dL    Specific Gravity Urine 1.044 (H) 1.001 - 1.030    Blood Urine 0.03 mg/dL (A) Negative    pH Urine 5.5 5.0 - 7.0    Protein Albumin Urine 10  (A) Negative mg/dL    Urobilinogen Urine <2.0 <2.0 mg/dL    Nitrite Urine Negative Negative    Leukocyte Esterase Urine 25 Alexia/uL (A) Negative    Bacteria Urine Few (A) None Seen /HPF    Mucus Urine Present (A) None Seen /LPF    RBC Urine 3 (H) <=2 /HPF    WBC Urine 2 <=5 /HPF    Squamous Epithelials Urine 1 <=1 /HPF       RADIOLOGY:  Reviewed all pertinent imaging. Please see official radiology report.  MR Brain w/o & w Contrast   Final Result   IMPRESSION:   1.  Cluster of small recent ischemic infarcts involving the deep white matter of the right frontal lobe and right basal ganglia.   2.  No other recent infarcts.   3.  Age-related changes.      XR Chest Port 1 View   Final Result   IMPRESSION: Lungs are clear. Heart and pulmonary vascularity are normal. No signs of acute disease.      CTA Head Neck with Contrast   Final Result   IMPRESSION:      HEAD CT:   1. No acute intracranial abnormality.      HEAD CTA:   1. Variant anatomy, otherwise unremarkable intracranial vasculature.      NECK CTA:   1. 30% stenosis of the right ICA origin.      - - - - - - - - - - - - - - - - - - - - - - - - - - - - - - - - - - - - - - - - - - - - - - - - - - - - - - - - - - - - - - - - - - - - - - - - - - - -    The results above were discussed with Dr. Beltre on 8/30/2022 2:39 PM by Dr. Barboza  Garyaro.   - - - - - - - - - - - - - - - - - - - - - - - - - - - - - - - - - - - - - - - - - - - - - - - - - - - - - - - - - - - - - - - - - - - - - - - - - - - -           EKG:    Performed at: 1630    Impression: Possible left atrial enlargement.     Rate: 110  Rhythm: Sinus tachycardia.  Axis: 17  IL Interval: 152  QRS Interval: 90  QTc Interval: 473  ST Changes: None  Comparison: No previous EKG for comparison    I have independently reviewed and interpreted the EKG(s) documented above.    PROCEDURES:   none      I, Elise Gryler, am serving as a scribe to document services personally performed by Caleb Beltre MD based on my observation and the provider's statements to me. I, Dr. Caleb Beltre, attest that Elise Gryler is acting in a scribe capacity, has observed my performance of the services and has documented them in accordance with my direction.    Caleb Beltre MD  Emergency Medicine  [facility] EMERGENCY DEPARTMENT  [address] [place] [id number] [phone]       Caleb Beltre MD  08/30/22 2477       Caleb Beltre MD  08/30/22 9206

## 2022-08-30 NOTE — ED NOTES
ED Provider In Triage Note  [facility]  Encounter Date: Aug 30, 2022    No chief complaint on file.      Brief HPI:   Stephani Garcia is a 67 year old female, history of vertigo, presenting to the Emergency Department with a chief complaint of stroke symptoms. She awoke yesterday morning with balance difficulties and dizziness, which is not atypical for her, but seemed more intense than baseline. Today, ~9am, she had onset of slurring of her speech and word-finding difficulties; she awoke at 5:30 with normal speech.     No headache. No anticoagulants. Left hand paresthesias today. Denied extremity weakness to me - had told RN that helped her out of the car that she had left-sided weakness yesterday. No vision changes.    No chest pain or SOB.      Brief Physical Exam:  There were no vitals taken for this visit.  General: Non-toxic appearing  HEENT: Atraumatic  Resp: No respiratory distress  Abdomen: Non-peritoneal  Neuro: Alert, oriented, answers questions appropriate; cranial nerves grossly intact; 4++/5 strength LUE and LLE vs 5/5 strength RUE and RLE  Psych: Behavior appropriate      Plan Initiated in Triage:  Blood work  EKG    Brain imaging - difficult to discern time of onset of symptoms - either start with CTA head / neck vs brain MRI cow / carotid    PIT Dispo:   Return to lobby while awaiting workup and ED bed availability    Vicki Agudelo MD on 8/30/2022 at 2:00 PM    Patient was evaluated by the Physician in Triage due to a limitation of available rooms in the Emergency Department. A plan of care was discussed based on the information obtained on the initial evaluation and patient was consuled to return back to the Emergency Department lobby after this initial evalutaiton until results were obtained or a room became available in the Emergency Department. Patient was counseled not to leave prior to receiving the results of their workup.     MD AMY Mcrae  [facility] EMERGENCY DEPARTMENT  [address] [place] [id number]  [phone]       Vicki Agudelo MD  08/30/22 6324

## 2022-08-30 NOTE — CONSULTS
"[facility]    Stroke Consult Note    Reason for Consult: Stroke Code     Chief Complaint: Stroke Symptoms and Tier 2 Stroke Code      HPI  Stephani Garcia is a 67 year old female with past medical history significant for vertigo, HTN, and HLD. She was brought to the [facility] ED for evaluation of language impairment and left sided weakness. She reports that the weakness began on 8/29 in the afternoon. She did not notice the word finding difficulty until after she awoke this morning. She states that sometimes the correct words won't come out.      Imaging Findings  CTH negative for acute pathology  CTA with 30% R ICA stenosis, no LVO    Intravenous Thrombolysis  Not given due to:   - unclear or unfavorable risk-benefit profile for extended window thrombolysis beyond the conventional 4.5 hour time window    Endovascular Treatment  Not initiated due to absence of proximal vessel occlusion    Impression   Suspected acute ischemic stroke.     Recommendations  - Use orderset: \"Ischemic Stroke Routine Admission\" or \"Ischemic Stroke No Thrombolytics/No Thrombectomy ICU Admission\"  - Neurochecks and Vital Signs every 4 hours   - Permissive HTN; goal SBP < 220 mmHg  - Daily aspirin 81 mg for secondary stroke prevention  - Plavix (clopidogrel) 300 mg PO loading dose x 1  - Plavix (clopidogrel) 75 mg PO Daily  - Statin: high intensity   - MRI Brain with and without contrast  - TTE (with Bubble Study if age 60 yrs or less)  - Telemetry, EKG  - Bedside Glucose Monitoring  - A1c, Lipid Panel, Troponin x 3  - PT/OT/SLP  - Stroke Education  - Euthermia, Euglycemia       Bárbara Marshall, APRN, CNP  Vascular Neurology  To page me or covering stroke neurology team member, click here: AMCOM   Choose \"On Call\" tab at top, then search dropdown box for \"Neurology Adult\", select location, press Enter, then look for stroke/neuro " ICU/telestroke.    ______________________________________________________    Clinically Significant Risk Factors Present on Admission                     Past Medical History   History reviewed. No pertinent past medical history.  Past Surgical History   History reviewed. No pertinent surgical history.  Medications   Home Meds  Prior to Admission medications    Not on File       Scheduled Meds      Infusion Meds      PRN Meds      Allergies   Not on File  Family History   History reviewed. No pertinent family history.  Social History        Review of Systems   The 10 point Review of Systems is negative other than noted in the HPI or here.        PHYSICAL EXAMINATION  Temp:  [98.6  F (37  C)] 98.6  F (37  C)  Pulse:  [132] 132  Resp:  [20] 20  BP: (197)/(122) 197/122  SpO2:  [96 %] 96 %     Neuro Exam  Mental Status:  alert, oriented x 3, follows commands, intermittent word finding diffiuclty and mild dysarthria  Cranial Nerves:  visual fields intact (tested by nurse), EOMI with normal smooth pursuit, facial sensation intact and symmetric (tested by nurse), hearing not formally tested but intact to conversation, shoulder shrug equal bilaterally, tongue protrusion midline, L NLF flattening  Motor:  no abnormal movements, mild LUE drift, bilateral LE drift (L>R)  Reflexes:  unable to test (telestroke)  Sensory:  light touch sensation intact and symmetric throughout upper and lower extremities (assessed by nurse), no extinction on double simultaneous stimulation (assessed by nurse)  Coordination:  no ataxia out of proportion to weakness  Station/Gait:  unable to test due to telestroke    Dysphagia Screen  Dysarthria or facial droop present - Maintain NPO, consult SLP    Stroke Scales    NIHSS  1a. Level of Consciousness 0-->Alert, keenly responsive   1b. LOC Questions 0-->Answers both questions correctly   1c. LOC Commands 0-->Performs both tasks correctly   2.   Best Gaze 0-->Normal   3.   Visual 0-->No visual loss    4.   Facial Palsy 0-->Normal symmetrical movements   5a. Motor Arm, Left 1-->Drift, limb holds 90 (or 45) degrees, but drifts down before full 10 seconds, does not hit bed or other support   5b. Motor Arm, Right 0-->No drift, limb holds 90 (or 45) degrees for full 10 secs   6a. Motor Leg, Left 1-->Drift, leg falls by the end of the 5-sec period but does not hit bed   6b. Motor Leg, right 1-->Drift, leg falls by the end of the 5-sec period but does not hit bed   7.   Limb Ataxia 0-->Absent   8.   Sensory 0-->Normal, no sensory loss   9.   Best Language 1-->Mild-to-moderate aphasia, some obvious loss of fluency or facility of comprehension, without significant limitation on ideas expressed or form of expression. Reduction of speech and/or comprehension, however, makes conversation. . . (see row details)   10. Dysarthria 1-->Mild-to-moderate dysarthria, patient slurs at least some words and, at worst, can be understood with some difficulty   11. Extinction and Inattention  0-->No abnormality   Total 6 (08/30/22 1446)     Imaging  I personally reviewed all imaging; relevant findings per HPI.     Lab Results Data   CBC  No results for input(s): WBC, RBC, HGB, HCT, PLT in the last 168 hours.  Basic Metabolic Panel    Recent Labs   Lab 08/30/22  1409   *     Liver Panel  No results for input(s): PROTTOTAL, ALBUMIN, BILITOTAL, ALKPHOS, AST, ALT, BILIDIRECT in the last 168 hours.  INR  No lab results found.   Lipid Profile  No lab results found.  A1C  No lab results found.  Troponin  No results for input(s): CTROPT, TROPONINIS, TROPONINI, GHTROP in the last 168 hours.       Stroke Code Data Data   Stroke Code Data  (for stroke code with tele)  Stroke code activated 08/30/22   1410   First stroke provider response 08/30/22   1427 (delay due to multiple codes)   Video start time 08/30/22   1439   Video end time 08/30/22   1450   Last known normal 08/29/22       Time of discovery  (or onset of symptoms)  08/30/22    0900   Head CT read by Stroke Neuro Dr/Provider 08/30/22   1429   Was stroke code de-escalated? Yes 08/30/22 7513           Telestroke Service Details  Type of service telemedicine diagnostic assessment of acute neurological changes   Reason telemedicine is appropriate patient requires assessment with a specialist for diagnosis and treatment of neurological symptoms   Mode of transmission secure interactive audio and video communication per Khloe   Originating site (patient location) [facility]    Distant site (provider location) [facility]       I personally examined and evaluated the patient today. At the time of my evaluation and management the patient was in critical condition today due to acute neurologic deficits. I personally managed imaging review, physical exam, deescalation, communication with ED. Key decisions made today included ineligibility for acute intervention. I spent a total of 60 minutes providing critical care services, evaluating the patient, directing care and reviewing laboratory values and radiologic reports.

## 2022-08-30 NOTE — H&P
[facility]    History and Physical - Hospitalist Service       Date of Admission:  8/30/2022    Assessment & Plan      Stephani Garcia is a 68 yo F with h/o dyslipidemia, HTN, and GILDA who presents with a left-sided partial hemiparesis and mild dysarthria/expressive aphasia who has been found to have an acute ischemic stroke on MRI.  From onset of symptoms prevented intervention with either thrombolytics or thrombectomy and there were no large vessel occlusions seen on CTA.  She will be admitted overnight and an echo obtained in the morning.  She has been started on Plavix and aspirin and statin.  We will have neurology follow-up with her in the morning.  She will need a PT, OT, and speech evaluation in the morning.  She will need stroke education.  We will allow permissive hypertension for now up to 220 systolic and then slowly bring her blood pressure down.    Principal Problem:    Acute ischemic stroke -as above  Active Problems:    Hyperlipidemia -statin high-dose    Hypertension -allow blood pressure to run high for now and then slowly bring it down if it is remaining high    GILDA (obstructive sleep apnea) -she states she does not use CPAP so we will just follow this for now    Infection due to 2019 novel coronavirus -she has had very minimal cough and she is not hypoxic so I will order 3 doses of prophylactic remdesivir as she is high risk for progression.    Left Breast mass - likely defer to outpatient workup, present for several months.      {Diet: Regular Diet Adult    DVT Prophylaxis: Heparin SQ  Farooq Catheter: Not present  Central Lines: None  Cardiac Monitoring: None  Code Status:  Full    Clinically Significant Risk Factors Present on Admission                          Disposition Plan    2 days to home versus TCU     The patient's care was discussed with the Patient.    Vj Hamilton MD  Hospitalist Service  [facility]  Securely message with the [employer]  "Web Console (learn more here)  Text page via [facility] Paging/Directory         ______________________________________________________________________    Chief Complaint   Left-sided weakness and speech difficulty    History is obtained from the patient and electronic health record    History of Present Illness   Stephani Garcia is a 66 yo F with h/o dyslipidemia, HTN, and GILDA who presents with left-sided weakness in her left arm and left leg started yesterday afternoon.  She was able to walk but she felt like she had to hang onto things to stay steady.  She slept okay last night and this morning she woke up and still had the weakness and noticed increasing speech difficulty.  She had had some speech difficulty yesterday but very mild and intermittent.  She is having trouble word finding and a little bit of slurring of speech.  She denies any headaches or visual changes no scotoma or diplopia.  No hearing changes.  No loss of bowel or bladder control.  She never had a thing like this before.  She cannot think of anything that would have triggered this.  She has had a slight cough and is feeling a little rundown just recently but not very long.  No fevers or chills or nausea or vomiting or diarrhea or melena or hematochezia.    Review of Systems    The 10 point Review of Systems is negative other than noted in the HPI.    Past Medical History    I have reviewed this patient's medical history and updated it with pertinent information if needed.   Past Medical History:   Diagnosis Date     Hyperlipidemia 12/7/2009     Hypertension 12/7/2009     GILDA (obstructive sleep apnea) 9/7/2011    Formatting of this note might be different from the original. Split-Night Polysomnogram Interpretation  Date of read:  9/7/2011  Estimated Body mass index is 26.94 kg/(m^2) as calculated from the following:   Height as of 8/19/11: 5' 7\"(1.702 m).   Weight as of 8/19/11: 172 lb(78.019 kg). Total [place] Score: (not recorded) Neck " Circumference (in inches): 13   Baseline: This study was performed in       Past Surgical History   I have reviewed this patient's surgical history and updated it with pertinent information if needed.  Past Surgical History:   Procedure Laterality Date      SECTION         Social History   I have reviewed this patient's social history and updated it with pertinent information if needed.  Social History     Tobacco Use     Smoking status: Never Smoker   Substance Use Topics     Alcohol use: Yes     Alcohol/week: 1.0 standard drink     Types: 1 Glasses of wine per week     Comment: occasional     Drug use: Never       Family History   I have reviewed this patient's family history and updated it with pertinent information if needed.  Family History   Problem Relation Age of Onset     Hyperlipidemia Mother      Lung Cancer Mother      Cataracts Father      Lung Cancer Father        Prior to Admission Medications   None     Allergies   Allergies   Allergen Reactions     Sulfa Drugs Headache     Tetracyclines Hives     Occurred many years ago.        Physical Exam   Vital Signs: Temp: 98.6  F (37  C) Temp src: Oral BP: (!) 222/117 Pulse: 117   Resp: 20 SpO2: 96 % O2 Device: None (Room air)    Weight: 0 lbs 0 oz    General Appearance: Elderly female in no acute distress  Eyes: PERRL, EOMI, conjunctive are clear and moist  HEENT: NC/AT, ears and nose clear without discharge, oropharynx is clear without exudates or erythema, neck is supple  Respiratory: Occasional rhonchi  Cardiovascular: Regular rate and rhythm S1 and S2 1 out of 6 systolic murmur, no JVD no edema  GI: Positive bowel sounds soft nontender nondistended without hepatosplenomegaly appreciated  Lymph/Hematologic: No nodes in neck or groin areas noted  Skin: No rashes or lesions on exposed areas, large 5-6 cm mass on left medial breast  Musculoskeletal: Extremities are warm and nontender, no joint swelling or erythema  Neurologic: She is alert, oriented,  motor is 4- out of 5 left upper extremity and left lower extremity at hips shoulders elbows knees wrists ankles and fingers and toes and 5 out of 5 on right side.  Cranial nerves II through XII intact.  Sensory may be slightly subjectively decreased on left upper extremity, mild drift on left upper extremity, last mild dysarthria, some word finding difficulty.  Psychiatric: Affect is calm and appropriate    Data   Data reviewed today: I reviewed all medications, new labs and imaging results over the last 24 hours.     Recent Labs   Lab 08/30/22  1438 08/30/22  1409   WBC 8.7  --    HGB 15.6  --    MCV 85  --      --    INR 1.00  --      --    POTASSIUM 3.5  --    CHLORIDE 103  --    CO2 23  --    BUN 13  --    CR 1.01  --    ANIONGAP 11  --    ERNIE 9.7  --     136*     8.7    \    15.6    /    276   N 82    L N/A    137    103    13 /   ------------------------------------ 118   ALT N/A   AST N/A   AP N/A   ALB N/A   Ca 9.7  3.5    23    1.01 \    % RETIC N/A    LDH N/A  Troponin N/A    BNP N/A    CK N/A  INR 1.00   PTT 31    D-dimer N/A    Fibrinogen N/A    Antithrombin N/A  Ferritin N/A  CRP N/A    IL-6 N/A  Recent Results (from the past 24 hour(s))   CTA Head Neck with Contrast    Narrative    EXAM: CTA HEAD NECK W CONTRAST  LOCATION: [facility]  DATE/TIME: 8/30/2022 2:30 PM    INDICATION: Weakness. Stroke evaluation.  COMPARISON: None available at time of dictation.  CONTRAST: 75 mL Isovue-370  TECHNIQUE: Head and neck CT angiogram with IV contrast. Noncontrast head CT followed by axial helical CT images of the head and neck vessels obtained during the arterial phase of intravenous contrast administration. Axial 2D reconstructed images and   multiplanar 3D MIP reconstructed images of the head and neck vessels were performed by the technologist. Dose reduction techniques were used. All stenosis measurements made according to NASCET criteria unless otherwise  specified.    FINDINGS:   NONCONTRAST HEAD CT:  INTRACRANIAL CONTENTS: No acute intracranial hemorrhage. No CT evidence of acute infarct. Normal ventricles without hydrocephalus. No extra-axial fluid collection. Patent basal cisterns.    VISUALIZED ORBITS/SINUSES/MASTOIDS: The orbits are unremarkable. The visualized paranasal sinuses and temporal bone structures are well-aerated.     BONES/SOFT TISSUES: The calvarium and skull base are unremarkable.     HEAD CTA:  ANTERIOR CIRCULATION: No stenosis/occlusion, aneurysm, or high flow vascular malformation. The anterior communicating artery is patent. Fetal origin of the bilateral posterior cerebral arteries.     POSTERIOR CIRCULATION: No stenosis/occlusion, aneurysm, or high flow vascular malformation. Balanced vertebrobasilar circulation. The basilar artery is unremarkable and gives rise to patent superior cerebellar and hypoplastic P1 segments of the posterior   cerebral arteries.     DURAL VENOUS SINUSES: Patent.    NECK CTA:  RIGHT CAROTID: Normal course and persist caliber of the common carotid artery. Atherosclerotic plaque results in approximately 30% stenosis of the ICA origin. The remaining extracranial internal carotid artery demonstrate persistent caliber without   evidence of dissection.    LEFT CAROTID: Normal course and persist caliber of the common carotid artery. Normal course and persist caliber of the extracranial internal carotid artery without evidence of stenosis or dissection.    VERTEBRAL ARTERIES: Balance configuration without stenosis or dissection.    AORTIC ARCH: Classic three-vessel arch. The origins of the great vessels are unremarkable without significant stenosis.    NONVASCULAR STRUCTURES: There are no masses or enlarged lymph nodes in the neck. The submandibular, parotid, and thyroid glands are unremarkable. Multilevel degenerative changes without high-grade spinal canal stenosis or neural foraminal narrowing. No   aggressive osseous  lesions. The visualized lungs are unremarkable.      Impression    IMPRESSION:    HEAD CT:  1. No acute intracranial abnormality.    HEAD CTA:  1. Variant anatomy, otherwise unremarkable intracranial vasculature.    NECK CTA:  1. 30% stenosis of the right ICA origin.    - - - - - - - - - - - - - - - - - - - - - - - - - - - - - - - - - - - - - - - - - - - - - - - - - - - - - - - - - - - - - - - - - - - - - - - - - - - -   The results above were discussed with Dr. Beltre on 8/30/2022 2:39 PM by Dr. Jabari Sams.  - - - - - - - - - - - - - - - - - - - - - - - - - - - - - - - - - - - - - - - - - - - - - - - - - - - - - - - - - - - - - - - - - - - - - - - - - - - -    XR Chest Port 1 View    Narrative    EXAM: XR CHEST PORT 1 VIEW  LOCATION: [facility]  DATE/TIME: 8/30/2022 4:45 PM    INDICATION: cough, mild hypoxemia, decreased breath sounds on left  COMPARISON: None.      Impression    IMPRESSION: Lungs are clear. Heart and pulmonary vascularity are normal. No signs of acute disease.   MR Brain w/o & w Contrast    Narrative    EXAM: MR BRAIN W/O and W CONTRAST  LOCATION: [facility]  DATE/TIME: 8/30/2022 8:51 PM    INDICATION: Left-sided weakness, slurred speech.  COMPARISON: Head CT same day  CONTRAST: 7mL gadavist  TECHNIQUE: Routine multiplanar multisequence head MRI without and with intravenous contrast.    FINDINGS:  INTRACRANIAL CONTENTS: There is a cluster of small recent ischemic infarcts involving the deep white matter of the right frontal lobe extending down into the right basal ganglia. No other recent infarcts. No mass, acute hemorrhage, or extra-axial fluid   collections. Scattered nonspecific T2/FLAIR hyperintensities within the cerebral white matter most consistent with mild chronic microvascular ischemic change. Mild generalized cerebral atrophy. No hydrocephalus. Normal position of the cerebellar tonsils.   No pathologic contrast  enhancement.    SELLA: No abnormality accounting for technique.    OSSEOUS STRUCTURES/SOFT TISSUES: Normal marrow signal. The major intracranial vascular flow voids are maintained.     ORBITS: No abnormality accounting for technique.     SINUSES/MASTOIDS: No paranasal sinus mucosal disease. No middle ear or mastoid effusion.       Impression    IMPRESSION:  1.  Cluster of small recent ischemic infarcts involving the deep white matter of the right frontal lobe and right basal ganglia.  2.  No other recent infarcts.  3.  Age-related changes.

## 2022-08-30 NOTE — PHARMACY-ADMISSION MEDICATION HISTORY
Pharmacy Note - Admission Medication History     ______________________________________________________________________    Prior To Admission (PTA) med list completed and updated in EMR.       No outpatient medications have been marked as taking for the 8/30/22 encounter (Hospital Encounter).       Information source(s): Patient, Clinic records and [facility]  Method of interview communication: in-person    Summary of Changes to PTA Med List  New: nothing   Discontinued: nothing   Changed: nothing     Patient was asked about OTC/herbal products specifically.  PTA med list reflects this.    In the past week, patient estimated taking medication this percent of the time:  greater than 90%.    Allergies were reviewed, assessed, and updated with the patient.      Patient does not use any multi-dose medications prior to admission.    The information provided in this note is only as accurate as the sources available at the time of the update(s).    Thank you for the opportunity to participate in the care of this patient.    Joel Richards [facility]  8/30/2022 3:12 PM

## 2022-08-31 ENCOUNTER — APPOINTMENT (OUTPATIENT)
Dept: PHYSICAL THERAPY | Facility: HOSPITAL | Age: 67
DRG: 064 | End: 2022-08-31
Attending: INTERNAL MEDICINE
Payer: COMMERCIAL

## 2022-08-31 ENCOUNTER — APPOINTMENT (OUTPATIENT)
Dept: OCCUPATIONAL THERAPY | Facility: HOSPITAL | Age: 67
DRG: 064 | End: 2022-08-31
Attending: INTERNAL MEDICINE
Payer: COMMERCIAL

## 2022-08-31 ENCOUNTER — APPOINTMENT (OUTPATIENT)
Dept: CARDIOLOGY | Facility: HOSPITAL | Age: 67
DRG: 064 | End: 2022-08-31
Attending: INTERNAL MEDICINE
Payer: COMMERCIAL

## 2022-08-31 ENCOUNTER — APPOINTMENT (OUTPATIENT)
Dept: CT IMAGING | Facility: HOSPITAL | Age: 67
DRG: 064 | End: 2022-08-31
Attending: INTERNAL MEDICINE
Payer: COMMERCIAL

## 2022-08-31 ENCOUNTER — APPOINTMENT (OUTPATIENT)
Dept: SPEECH THERAPY | Facility: HOSPITAL | Age: 67
DRG: 064 | End: 2022-08-31
Attending: INTERNAL MEDICINE
Payer: COMMERCIAL

## 2022-08-31 LAB
ANION GAP SERPL CALCULATED.3IONS-SCNC: 11 MMOL/L (ref 5–18)
ATRIAL RATE - MUSE: 110 BPM
BUN SERPL-MCNC: 11 MG/DL (ref 8–22)
CALCIUM SERPL-MCNC: 9 MG/DL (ref 8.5–10.5)
CHLORIDE BLD-SCNC: 104 MMOL/L (ref 98–107)
CO2 SERPL-SCNC: 25 MMOL/L (ref 22–31)
CREAT SERPL-MCNC: 0.94 MG/DL (ref 0.6–1.1)
DIASTOLIC BLOOD PRESSURE - MUSE: NORMAL MMHG
ERYTHROCYTE [DISTWIDTH] IN BLOOD BY AUTOMATED COUNT: 13.1 % (ref 10–15)
GFR SERPL CREATININE-BSD FRML MDRD: 66 ML/MIN/1.73M2
GLUCOSE BLD-MCNC: 97 MG/DL (ref 70–125)
GLUCOSE BLDC GLUCOMTR-MCNC: 100 MG/DL (ref 70–99)
GLUCOSE BLDC GLUCOMTR-MCNC: 110 MG/DL (ref 70–99)
GLUCOSE BLDC GLUCOMTR-MCNC: 110 MG/DL (ref 70–99)
GLUCOSE BLDC GLUCOMTR-MCNC: 90 MG/DL (ref 70–99)
GLUCOSE BLDC GLUCOMTR-MCNC: 98 MG/DL (ref 70–99)
HCT VFR BLD AUTO: 45.4 % (ref 35–47)
HGB BLD-MCNC: 15 G/DL (ref 11.7–15.7)
INTERPRETATION ECG - MUSE: NORMAL
MCH RBC QN AUTO: 28.9 PG (ref 26.5–33)
MCHC RBC AUTO-ENTMCNC: 33 G/DL (ref 31.5–36.5)
MCV RBC AUTO: 88 FL (ref 78–100)
P AXIS - MUSE: 36 DEGREES
PLATELET # BLD AUTO: 227 10E3/UL (ref 150–450)
POTASSIUM BLD-SCNC: 3.3 MMOL/L (ref 3.5–5)
PR INTERVAL - MUSE: 152 MS
QRS DURATION - MUSE: 90 MS
QT - MUSE: 350 MS
QTC - MUSE: 473 MS
R AXIS - MUSE: 17 DEGREES
RBC # BLD AUTO: 5.19 10E6/UL (ref 3.8–5.2)
SODIUM SERPL-SCNC: 140 MMOL/L (ref 136–145)
SYSTOLIC BLOOD PRESSURE - MUSE: NORMAL MMHG
T AXIS - MUSE: 25 DEGREES
VENTRICULAR RATE- MUSE: 110 BPM
WBC # BLD AUTO: 5.6 10E3/UL (ref 4–11)

## 2022-08-31 PROCEDURE — 93306 TTE W/DOPPLER COMPLETE: CPT

## 2022-08-31 PROCEDURE — 99223 1ST HOSP IP/OBS HIGH 75: CPT | Performed by: PSYCHIATRY & NEUROLOGY

## 2022-08-31 PROCEDURE — 120N000001 HC R&B MED SURG/OB

## 2022-08-31 PROCEDURE — 93306 TTE W/DOPPLER COMPLETE: CPT | Mod: 26 | Performed by: INTERNAL MEDICINE

## 2022-08-31 PROCEDURE — 85027 COMPLETE CBC AUTOMATED: CPT | Performed by: INTERNAL MEDICINE

## 2022-08-31 PROCEDURE — 250N000013 HC RX MED GY IP 250 OP 250 PS 637: Performed by: INTERNAL MEDICINE

## 2022-08-31 PROCEDURE — 92610 EVALUATE SWALLOWING FUNCTION: CPT | Mod: GN

## 2022-08-31 PROCEDURE — 99233 SBSQ HOSP IP/OBS HIGH 50: CPT | Performed by: INTERNAL MEDICINE

## 2022-08-31 PROCEDURE — 97112 NEUROMUSCULAR REEDUCATION: CPT | Mod: GP

## 2022-08-31 PROCEDURE — 97535 SELF CARE MNGMENT TRAINING: CPT | Mod: GO

## 2022-08-31 PROCEDURE — 258N000003 HC RX IP 258 OP 636: Performed by: INTERNAL MEDICINE

## 2022-08-31 PROCEDURE — 92523 SPEECH SOUND LANG COMPREHEN: CPT | Mod: GN

## 2022-08-31 PROCEDURE — 97530 THERAPEUTIC ACTIVITIES: CPT | Mod: GP

## 2022-08-31 PROCEDURE — 250N000011 HC RX IP 250 OP 636: Performed by: INTERNAL MEDICINE

## 2022-08-31 PROCEDURE — 36415 COLL VENOUS BLD VENIPUNCTURE: CPT | Performed by: INTERNAL MEDICINE

## 2022-08-31 PROCEDURE — XW033E5 INTRODUCTION OF REMDESIVIR ANTI-INFECTIVE INTO PERIPHERAL VEIN, PERCUTANEOUS APPROACH, NEW TECHNOLOGY GROUP 5: ICD-10-PCS | Performed by: INTERNAL MEDICINE

## 2022-08-31 PROCEDURE — 97162 PT EVAL MOD COMPLEX 30 MIN: CPT | Mod: GP

## 2022-08-31 PROCEDURE — 250N000013 HC RX MED GY IP 250 OP 250 PS 637

## 2022-08-31 PROCEDURE — 70450 CT HEAD/BRAIN W/O DYE: CPT

## 2022-08-31 PROCEDURE — 97112 NEUROMUSCULAR REEDUCATION: CPT | Mod: GO

## 2022-08-31 PROCEDURE — 97166 OT EVAL MOD COMPLEX 45 MIN: CPT | Mod: GO

## 2022-08-31 PROCEDURE — 82310 ASSAY OF CALCIUM: CPT | Performed by: INTERNAL MEDICINE

## 2022-08-31 RX ORDER — BENZONATATE 100 MG/1
100 CAPSULE ORAL 3 TIMES DAILY PRN
Status: DISCONTINUED | OUTPATIENT
Start: 2022-08-31 | End: 2022-09-05 | Stop reason: HOSPADM

## 2022-08-31 RX ORDER — LISINOPRIL 5 MG/1
5 TABLET ORAL AT BEDTIME
Status: DISCONTINUED | OUTPATIENT
Start: 2022-08-31 | End: 2022-09-01

## 2022-08-31 RX ORDER — IBUPROFEN 200 MG
200 TABLET ORAL EVERY 6 HOURS PRN
Status: DISCONTINUED | OUTPATIENT
Start: 2022-08-31 | End: 2022-09-05 | Stop reason: HOSPADM

## 2022-08-31 RX ORDER — GUAIFENESIN 200 MG/1
200 TABLET ORAL EVERY 4 HOURS PRN
Status: DISCONTINUED | OUTPATIENT
Start: 2022-08-31 | End: 2022-09-05 | Stop reason: HOSPADM

## 2022-08-31 RX ORDER — ACETAMINOPHEN 325 MG/1
650 TABLET ORAL EVERY 4 HOURS PRN
Status: DISCONTINUED | OUTPATIENT
Start: 2022-08-31 | End: 2022-09-05 | Stop reason: HOSPADM

## 2022-08-31 RX ADMIN — SODIUM CHLORIDE 50 ML: 9 INJECTION, SOLUTION INTRAVENOUS at 22:05

## 2022-08-31 RX ADMIN — LISINOPRIL 5 MG: 5 TABLET ORAL at 21:13

## 2022-08-31 RX ADMIN — HYDRALAZINE HYDROCHLORIDE 10 MG: 20 INJECTION, SOLUTION INTRAMUSCULAR; INTRAVENOUS at 17:03

## 2022-08-31 RX ADMIN — ACETAMINOPHEN 650 MG: 325 TABLET ORAL at 17:01

## 2022-08-31 RX ADMIN — ATORVASTATIN CALCIUM 80 MG: 40 TABLET, FILM COATED ORAL at 01:55

## 2022-08-31 RX ADMIN — SODIUM CHLORIDE 50 ML: 9 INJECTION, SOLUTION INTRAVENOUS at 01:49

## 2022-08-31 RX ADMIN — ATORVASTATIN CALCIUM 80 MG: 40 TABLET, FILM COATED ORAL at 19:42

## 2022-08-31 RX ADMIN — BENZONATATE 100 MG: 100 CAPSULE ORAL at 21:14

## 2022-08-31 RX ADMIN — CLOPIDOGREL BISULFATE 75 MG: 75 TABLET ORAL at 08:34

## 2022-08-31 RX ADMIN — REMDESIVIR 200 MG: 100 INJECTION, POWDER, LYOPHILIZED, FOR SOLUTION INTRAVENOUS at 01:44

## 2022-08-31 RX ADMIN — HEPARIN SODIUM 5000 UNITS: 5000 INJECTION, SOLUTION INTRAVENOUS; SUBCUTANEOUS at 11:46

## 2022-08-31 RX ADMIN — IBUPROFEN 200 MG: 200 TABLET, FILM COATED ORAL at 19:42

## 2022-08-31 RX ADMIN — HEPARIN SODIUM 5000 UNITS: 5000 INJECTION, SOLUTION INTRAVENOUS; SUBCUTANEOUS at 01:41

## 2022-08-31 RX ADMIN — REMDESIVIR 100 MG: 100 INJECTION, POWDER, LYOPHILIZED, FOR SOLUTION INTRAVENOUS at 21:07

## 2022-08-31 RX ADMIN — Medication 81 MG: at 08:33

## 2022-08-31 ASSESSMENT — ACTIVITIES OF DAILY LIVING (ADL)
ADLS_ACUITY_SCORE: 35
WALKING_OR_CLIMBING_STAIRS_DIFFICULTY: NO
TOILETING_ISSUES: NO
ADLS_ACUITY_SCORE: 22
ADLS_ACUITY_SCORE: 35
CONCENTRATING,_REMEMBERING_OR_MAKING_DECISIONS_DIFFICULTY: NO
ADLS_ACUITY_SCORE: 22
ADLS_ACUITY_SCORE: 26
ADLS_ACUITY_SCORE: 35
DIFFICULTY_EATING/SWALLOWING: NO
ADLS_ACUITY_SCORE: 21
ADLS_ACUITY_SCORE: 36
FALL_HISTORY_WITHIN_LAST_SIX_MONTHS: YES
ADLS_ACUITY_SCORE: 35
ADLS_ACUITY_SCORE: 35
ADLS_ACUITY_SCORE: 21
DRESSING/BATHING_DIFFICULTY: NO
CHANGE_IN_FUNCTIONAL_STATUS_SINCE_ONSET_OF_CURRENT_ILLNESS/INJURY: NO
ADLS_ACUITY_SCORE: 36
NUMBER_OF_TIMES_PATIENT_HAS_FALLEN_WITHIN_LAST_SIX_MONTHS: 4
DOING_ERRANDS_INDEPENDENTLY_DIFFICULTY: NO
WEAR_GLASSES_OR_BLIND: YES

## 2022-08-31 NOTE — ED NOTES
Patient's son Aleksandar updated to floor transfer. Aleksandar's contact phone number updated as well.

## 2022-08-31 NOTE — ED NOTES
Pt's family asked for assistance to get pt out of car stating she was here for stroke symptoms.  Pt was assisted to wheelchair Pt was unable to move her left arm or leg well.  Pt and family stated that pt has hx of vertigo and woke with that yesterday.  At that time pt also noted some left side weakness.  Pt stated that today weakness worsened and her speech was not normal.

## 2022-08-31 NOTE — PHARMACY-CONSULT NOTE
Pharmacy Consult to evaluate for medication related stroke core measures    Stephani Garcia, 67 year old female admitted for stroke symptoms on 8/30/2022.    Thrombolytic was not given because of Time from onset contraindications    VTE Prophylaxis Heparin given on 8/31/22 as appropriate prior to end of hospital day 2.    Antithrombotic: aspirin and clopidogrel started on 8/30/22, as appropriate by end of hospital day 2. Continue antithrombotic therapy on discharge to meet quality measures, unless contraindicated.    Anticoagulation if history of A-fib/flutter: Patient does not have history of A-fib/flutter - anticoagulation not required for medication related stroke core measures.     LDL Cholesterol Calculated   Date Value Ref Range Status   08/30/2022 206 (H) <=129 mg/dL Final       Patient currently receiving Lipitor (atorvastatin) continue statin on discharge to meet quality measures, unless contraindicated.    Recommendations: None at this time    Thank you for the consult.    Chente Tran RPH 8/31/2022 10:54 AM

## 2022-08-31 NOTE — PLAN OF CARE
"Problem: Adjustment to Illness (Stroke, Ischemic/Transient Ischemic Attack)  Goal: Optimal Coping  Outcome: Ongoing, Progressing  Problem: Cognitive Impairment (Stroke, Ischemic/Transient Ischemic Attack)  Goal: Optimal Cognitive Function  Outcome: Ongoing, Progressing  Problem: Communication Impairment (Stroke, Ischemic/Transient Ischemic Attack)  Goal: Improved Communication Skills  Outcome: Ongoing, Progressing  Patient scored 7 on NIH scale. LOC can fluctuate. Patient has slight right facial droop, left side is weak, left side sensation is dull compared to right, and patient has mild dysarthria and aphasia.    Hypertensive but within goal of SBP <220. No PRN given. Otherwise VSS. Plan for echo today.    Problem: Impaired Wound Healing  Goal: Optimal Wound Healing  Outcome: Ongoing, Progressing  Intervention: Promote Wound Healing  Problem: Skin Injury Risk Increased  Goal: Skin Health and Integrity  Outcome: Ongoing, Progressing  Intervention: Optimize Skin Protection  Patient has large hard lump with open wounds on left breast. Purple and red in color. Dry gauze covering due to bleeding. Patient denies pain. RUE along inner elbow has hard large lump, with no wounds. Patient reports both have been there for a few months and she has \"been meaning to get them checked\". Continuing to monitor.    Nicole Edmondson RN      "

## 2022-08-31 NOTE — PROGRESS NOTES
08/31/22 1038   Quick Adds   Type of Visit Initial PT Evaluation   Living Environment   People in Home spouse  (son is living with them for a few months.)   Current Living Arrangements house  ([facility]. that is 2 level.)   Home Accessibility stairs to enter home;stairs within home   Number of Stairs, Main Entrance other (see comments)  (1.5 steps to enter)   Stair Railings, Main Entrance railing on left side (ascending)   Number of Stairs, Within Home, Primary greater than 10 stairs   Stair Railings, Within Home, Primary railing on left side (ascending)   Living Environment Comments Pt said she could stay on on level for ADLs but would rather not.   Self-Care   Usual Activity Tolerance good   Current Activity Tolerance fair   Equipment Currently Used at Home none   Fall history within last six months yes   Number of times patient has fallen within last six months 4   Activity/Exercise/Self-Care Comment indep with walking without AD, Assisted with driving. Indep with ADLs .   General Information   Onset of Illness/Injury or Date of Surgery 08/30/22   Referring Physician Mona rCaft   Patient/Family Therapy Goals Statement (PT) none stated   Pertinent History of Current Problem (include personal factors and/or comorbidities that impact the POC) Stephani Garcia is a 67 year old female, history of vertigo, presenting to the Emergency Department with a chief complaint of stroke symptoms. She awoke yesterday morning with balance difficulties and dizziness, which is not atypical for her, but seemed more intense than baseline. Today, ~9am, she had onset of slurring of her speech and word-finding difficulties; she awoke at 5:30 with normal speech.   Existing Precautions/Restrictions   (special precautions.)   Weight-Bearing Status - LLE weight-bearing as tolerated   Weight-Bearing Status - RLE weight-bearing as tolerated   Cognition   Orientation Status (Cognition) oriented to;person;place;time   Cognitive Status  Comments Some increased cues for tasks.  (expressive dyspasia.)   Pain Assessment   Patient Currently in Pain No   Posture    Posture Comments Pt leans L in sitting and standing.  (Pt's head is forward flexed.)   Range of Motion (ROM)   ROM Comment LEs PROM WFL.   Strength (Manual Muscle Testing)   Strength Comments R LE 5/5 and L LE 3+4/5 Some increased cues to attend to the L LE   Bed Mobility   Comment, (Bed Mobility) supine<>sit with mod A x2 with assist and cues for technique and to get her to attend to the L side more.   Transfers   Comment, (Transfers) Sit<>stand  with HHA x2 with mod A x 2,  with assist for L UE support.   Gait/Stairs (Locomotion)   [place] Level (Gait) moderate assist (50% patient effort);2 person assist   Assistive Device (Gait) other (see comments)  (HHA x2)   Distance in Feet (Required for LE Total Joints) 4 small steps to her L   Deviations/Abnormal Patterns (Gait) gait speed decreased   Comment, (Gait/Stairs) Pt needed mod A x 2 with assist for bal, assist with L LE movement and cues to attend more to the L side.   Balance   Balance Comments Mod Ax 2 with HHAx2 static standing.  Pt tends to lean to the L in sitting and needed min A to correct static sit bal and static stand bal with mod A x2 and she needed assist to move the L LE with dyn bal.   Sensory Examination   Sensory Perception Comments Possible left side neglect.  Pt did not track to the left side well.   Neuromuscular Re-education   Minutes of Treatment 10   Clinical Impression   Criteria for Skilled Therapeutic Intervention Yes, treatment indicated   PT Diagnosis (PT) impaired functional mobilitiy,   Influenced by the following impairments left hemipareis, dec bal,  pt is below her PLOF.  possible L side  neglect.   Functional limitations due to impairments bed mobility, transfers, gait,   Clinical Presentation (PT Evaluation Complexity) Evolving/Changing   Clinical Presentation Rationale Pt presents medically  diagnosed.   Clinical Decision Making (Complexity) moderate complexity   Planned Therapy Interventions (PT) balance training;bed mobility training;gait training;neuromuscular re-education;strengthening;transfer training   Anticipated Equipment Needs at Discharge (PT) walker, benny   Risk & Benefits of therapy have been explained evaluation/treatment results reviewed;care plan/treatment goals reviewed;current/potential barriers reviewed;patient;participants voiced agreement with care plan   PT Discharge Planning   PT Discharge Recommendation (DC Rec) [facility] patient will benefit from intensive, interdisciplinary therapy.  Anticipate will be able to tolerate 3 hours of therapy per day   PT Rationale for DC Rec Pt does have L hemiparesis and might have a left side neglect as well.  Pt does need assist of 2 with mobility.  AR highly recommended for neurorehab.   Total Evaluation Time   Total Evaluation Time (Minutes) 10   Physical Therapy Goals   PT Frequency Daily   PT Predicted Duration/Target Date for Goal Attainment 09/07/22   PT Goals Bed Mobility;Transfers;Gait;PT Goal 1   PT: Bed Mobility Minimal assist;Supine to/from sit;Rolling   PT: Transfers Minimal assist;Sit to/from stand;Bed to/from chair;Assistive device   PT: Gait Minimal assist;Benny walker;25 feet   PT: Goal 1 Pt to delisa bilat LE ex x 10 to 20 reps with SBA to increase strength for mobility.

## 2022-08-31 NOTE — ED NOTES
"..  [facility] ED Handoff Report    ED Chief Complaint: Stephani Garcia is a 67 year old female, history of vertigo, presenting to the Emergency Department with a chief complaint of stroke symptoms. She awoke yesterday morning with balance difficulties and dizziness, which is not atypical for her, but seemed more intense than baseline. Today, ~9am, she had onset of slurring of her speech and word-finding difficulties; she awoke at 5:30 with normal speech.      No headache. No anticoagulants. Left hand paresthesias today. Denied extremity weakness to me - had told RN that helped her out of the car that she had left-sided weakness yesterday. No vision changes.    No chest pain or SOB.    ED Diagnosis:  (R29.90) Stroke-like symptom  Comment: Left sided weakness, left sided facial droop and slurred speech  Plan: Admission for PT/OT/SPT    (U07.1) Infection due to 2019 novel coronavirus  Comment: 94% sats on RA  Plan: Remdesivir to be initiated this evening    (N63.20) Left breast mass  Comment: Bandage in place.  Plan: WOC?       PMH:    Past Medical History:   Diagnosis Date     Hyperlipidemia 12/7/2009     Hypertension 12/7/2009     GILDA (obstructive sleep apnea) 9/7/2011    Formatting of this note might be different from the original. Split-Night Polysomnogram Interpretation  Date of read:  9/7/2011  Estimated Body mass index is 26.94 kg/(m^2) as calculated from the following:   Height as of 8/19/11: 5' 7\"(1.702 m).   Weight as of 8/19/11: 172 lb(78.019 kg). Total [place] Score: (not recorded) Neck Circumference (in inches): 13   Baseline: This study was performed in        Code Status:  Full Code     Falls Risk: Yes Band: Applied    Current Living Situation/Residence: lives with a significant other     Elimination Status: Continent: No     Activity Level: 2 assist    Patients Preferred Language:  English     Needed: No    Vital Signs:  BP (!) 201/106 (BP Location: Right arm, Patient Position: " Semi-West's)   Pulse 113   Temp 100.4  F (38  C) (Oral)   Resp 28   SpO2 94%      Cardiac Rhythm: Sinus Tachycardia    Pain Score: 0/10    Is the Patient Confused:  No    Last Food or Drink: 08/31/22 at 1230    Focused Assessment:  Neuro    Tests Performed: Done: Labs and Imaging    Treatments Provided:  Supportive    Family Dynamics/Concerns: No    Family Updated On Visitor Policy: Yes    Plan of Care Communicated to Family: Yes    Who Was Updated about Plan of Care: Spouse    Belongings Checklist Done and Signed by Patient: No    Medications sent with patient: yes    Covid: symptomatic, positive    Additional Information: Monitor patient with food and fluid intake, per speech therapy evaluation    RN: Nikki Flowers RN   8/31/2022 1:18 PM

## 2022-08-31 NOTE — ED NOTES
No standard order set seen for patient, but have been following the stroke order set per communication with Dr Beltre; sent text to Dr Hamilton to clarify whether meds should be given and at what permissible parameter for BP; bp has been in the 210's over 110s.

## 2022-08-31 NOTE — CONSULTS
NEUROLOGY CONSULTATION NOTE     Stephani Garcia,  1955, MRN [id number] Date: 2022     [facility]   Code status:  Full Code   PCP: System, Provider Not In, None      ASSESSMENT & PLAN   Diagnosis code: Subacute strokes    67 year old female admitted for subacute strokes.  The patient presented to the [facility] emergency room on 22 for evaluation of language impairment and left-sided weakness.  The weakness had reportedly started the day prior to presentation, the word finding difficulty that morning upon waking.  Patient has additional history of vertigo, hypertension and hyperlipidemia.        CT head negative for acute pathology.      CTA shows no large vessel occlusion or significant stenosis, 30% stenosis of right ICA.      MRI brain revealed small cluster of recent infarcts in the right frontal lobe and right basal ganglia.    No thrombolysis due to timeframe of symptoms.    Echocardiogram pending.    Patient is COVID-positive, on remdesivir.    UA abnormal.  Management per primary team.    Loaded with Plavix, started on daily Plavix 75 mg and aspirin 81 mg.    , high-dose Lipitor initiated.    Hemoglobin A1c 5.7%, management per primary team for euglycemia.      Telemetry, neurochecks.  Permissive hypertension, treat SBP>220.    Untreated GILDA?  Patient denies symptoms.    PT/OT/SLP.    Stroke education.    Neurology will follow along.       Chief Complaint   Patient presents with     Stroke Symptoms     Tier 2 Stroke Code        HISTORY OF PRESENT ILLNESS     We have been requested by Dr. Hamilton to evaluate Stephani Garcia who is a 67 year old female for subacute strokes.  The patient presented to the [facility] emergency room on 22 for evaluation of language impairment and left-sided weakness.  The weakness had reportedly started the day prior to presentation, the word finding difficulty that morning upon waking.  Patient has additional history of vertigo, hypertension  "and hyperlipidemia.  CT head negative for acute pathology.  CTA shows no large vessel occlusion or significant stenosis, 30% stenosis of right ICA.  MRI brain revealed small cluster of recent infarcts in the right frontal lobe and right basal ganglia. Admitted for additional stroke work-up.  Patient is COVID-positive.    Stephani says that she continues to have some problems with speaking.  She still feels like the left arm > leg are little weak.  No known history of cardiac arrhythmia.  She clarifies that she was not on aspirin or any other blood thinners prior to this event.  She is noticing that her throat is a little more sore and scratchy today compared to yesterday.    She does not snore heavily.  She is a non-smoker, both of her parents smoked.  Father had a stroke.    Spoke with Stephani's nurse.  It sounds like there is some fluctuation in the patient's reported sensory loss.  Left labial weakness noted on speech therapist's exam.     PAST MEDICAL & SURGICAL HISTORY     Medical History  Past Medical History:   Diagnosis Date     Hyperlipidemia 2009     Hypertension 2009     GILDA (obstructive sleep apnea) 2011    Formatting of this note might be different from the original. Split-Night Polysomnogram Interpretation  Date of read:  2011  Estimated Body mass index is 26.94 kg/(m^2) as calculated from the following:   Height as of 11: 5' 7\"(1.702 m).   Weight as of 11: 172 lb(78.019 kg). Total [place] Score: (not recorded) Neck Circumference (in inches): 13   Baseline: This study was performed in     Surgical History  Past Surgical History:   Procedure Laterality Date      SECTION          SOCIAL HISTORY     Social History      FAMILY HISTORY     Reviewed, and family history includes Cataracts in her father; Hyperlipidemia in her mother; Lung Cancer in her father and mother.     ALLERGIES     Allergies   Allergen Reactions     Sulfa Drugs Headache     Tetracyclines Hives     Occurred " many years ago.         REVIEW OF SYSTEMS     Pertinent items are noted in HPI.     HOME & HOSPITAL MEDICATIONS     Prior to Admission Medications  (Not in a hospital admission)      Hospital Medications    remdesivir  100 mg Intravenous Q24H    And     sodium chloride 0.9%  50 mL Intravenous Q24H     aspirin  81 mg Oral Daily     atorvastatin  80 mg Oral QPM     clopidogrel  75 mg Oral Daily     heparin ANTICOAGULANT  5,000 Units Subcutaneous Q12H     labetalol  10 mg Intravenous Once        PHYSICAL EXAM     Vital signs  Temp:  [98.6  F (37  C)-100.1  F (37.8  C)] 99.6  F (37.6  C)  Pulse:  [] 105  Resp:  [18-27] 21  BP: (176-222)/() 193/92  SpO2:  [92 %-99 %] 93 %    Weight:   [unfilled]    General Physical Exam: Patient is alert and oriented x 3. Vital signs were reviewed and are documented in EMR. Neck was supple.  No carotid bruit, thyromegaly, JVD or lymphadenopathy noted.  Neurological Exam:  Mental status: Attentive, interactive.  Speech: Some word finding difficulties, no dysarthria.  Cranial nerves: 2-12 intact other than palate, not visualized  Motor: 3+/5 in the left upper extremity, 4/5 in the left lower extremity.  Strength appears full on the right.  Sensory: Intact light touch throughout, though patient reports sensation is lighter on the left side.  Coordination: Some slight clumsiness with finger tapping on the left.  Normal on the right.  Gait: Deferred for safety.     DIAGNOSTIC STUDIES     Pertinent Radiology   Radiology Results: Personally reviewed image/s    CT Head:  IMPRESSION:     HEAD CT:  1. No acute intracranial abnormality.     HEAD CTA:  1. Variant anatomy, otherwise unremarkable intracranial vasculature.     NECK CTA:  1. 30% stenosis of the right ICA origin.    MRI Brain:  IMPRESSION:  1.  Cluster of small recent ischemic infarcts involving the deep white matter of the right frontal lobe and right basal ganglia.  2.  No other recent infarcts.  3.  Age-related  changes.    Pertinent Labs   Lab Results: personally reviewed.   Recent Results (from the past 24 hour(s))   Glucose by meter    Collection Time: 08/30/22  2:09 PM   Result Value Ref Range    GLUCOSE BY METER POCT 136 (H) 70 - 99 mg/dL   Basic metabolic panel    Collection Time: 08/30/22  2:38 PM   Result Value Ref Range    Sodium 137 136 - 145 mmol/L    Potassium 3.5 3.5 - 5.0 mmol/L    Chloride 103 98 - 107 mmol/L    Carbon Dioxide (CO2) 23 22 - 31 mmol/L    Anion Gap 11 5 - 18 mmol/L    Urea Nitrogen 13 8 - 22 mg/dL    Creatinine 1.01 0.60 - 1.10 mg/dL    Calcium 9.7 8.5 - 10.5 mg/dL    Glucose 118 70 - 125 mg/dL    GFR Estimate 60 (L) >60 mL/min/1.73m2   INR    Collection Time: 08/30/22  2:38 PM   Result Value Ref Range    INR 1.00 0.85 - 1.15   Partial thromboplastin time    Collection Time: 08/30/22  2:38 PM   Result Value Ref Range    aPTT 31 22 - 38 Seconds   Troponin I    Collection Time: 08/30/22  2:38 PM   Result Value Ref Range    Troponin I 0.03 0.00 - 0.29 ng/mL   CBC with platelets and differential    Collection Time: 08/30/22  2:38 PM   Result Value Ref Range    WBC Count 8.7 4.0 - 11.0 10e3/uL    RBC Count 5.40 (H) 3.80 - 5.20 10e6/uL    Hemoglobin 15.6 11.7 - 15.7 g/dL    Hematocrit 46.0 35.0 - 47.0 %    MCV 85 78 - 100 fL    MCH 28.9 26.5 - 33.0 pg    MCHC 33.9 31.5 - 36.5 g/dL    RDW 13.1 10.0 - 15.0 %    Platelet Count 276 150 - 450 10e3/uL    % Neutrophils 82 %    % Lymphocytes 6 %    % Monocytes 12 %    % Eosinophils 0 %    % Basophils 0 %    % Immature Granulocytes 0 %    NRBCs per 100 WBC 0 <1 /100    Absolute Neutrophils 7.0 1.6 - 8.3 10e3/uL    Absolute Lymphocytes 0.6 (L) 0.8 - 5.3 10e3/uL    Absolute Monocytes 1.1 0.0 - 1.3 10e3/uL    Absolute Eosinophils 0.0 0.0 - 0.7 10e3/uL    Absolute Basophils 0.0 0.0 - 0.2 10e3/uL    Absolute Immature Granulocytes 0.0 <=0.4 10e3/uL    Absolute NRBCs 0.0 10e3/uL   Extra Red Top Tube    Collection Time: 08/30/22  2:38 PM   Result Value Ref Range     Hold Specimen JIC    TSH with free T4 reflex    Collection Time: 08/30/22  2:38 PM   Result Value Ref Range    TSH 0.77 0.30 - 5.00 uIU/mL   Hepatic panel    Collection Time: 08/30/22  2:38 PM   Result Value Ref Range    Bilirubin Total 0.5 0.0 - 1.0 mg/dL    Bilirubin Direct 0.1 <=0.5 mg/dL    Protein Total 7.8 6.0 - 8.0 g/dL    Albumin 4.5 3.5 - 5.0 g/dL    Alkaline Phosphatase 109 45 - 120 U/L    AST 44 (H) 0 - 40 U/L    ALT 24 0 - 45 U/L   Hemoglobin A1c    Collection Time: 08/30/22  2:38 PM   Result Value Ref Range    Hemoglobin A1C 5.7 (H) <=5.6 %   Lipid panel reflex to direct LDL: Non-fasting    Collection Time: 08/30/22  2:38 PM   Result Value Ref Range    Cholesterol 281 (H) <=199 mg/dL    Triglycerides 136 <=149 mg/dL    Direct Measure HDL 48 (L) >=50 mg/dL    LDL Cholesterol Calculated 206 (H) <=129 mg/dL   Symptomatic; Unknown Influenza A/B & SARS-CoV2 (COVID-19) Virus PCR Multiplex Nasopharyngeal    Collection Time: 08/30/22  3:17 PM    Specimen: Nasopharyngeal; Swab   Result Value Ref Range    Influenza A PCR Negative Negative    Influenza B PCR Negative Negative    RSV PCR Negative Negative    SARS CoV2 PCR Positive (A) Negative   ECG 12-LEAD WITH MUSE (LHE)    Collection Time: 08/30/22  4:30 PM   Result Value Ref Range    Systolic Blood Pressure  mmHg    Diastolic Blood Pressure  mmHg    Ventricular Rate 110 BPM    Atrial Rate 110 BPM    OK Interval 152 ms    QRS Duration 90 ms     ms    QTc 473 ms    P Axis 36 degrees    R AXIS 17 degrees    T Axis 25 degrees    Interpretation ECG       Sinus tachycardia  Possible Left atrial enlargement  Borderline ECG  No previous ECGs available  Confirmed by SEE ED PROVIDER NOTE FOR, ECG INTERPRETATION ([id number]),  RAYSHAWN PUENTE ([id number]) on 8/31/2022 8:10:40 AM     Lactic acid whole blood    Collection Time: 08/30/22  4:57 PM   Result Value Ref Range    Lactic Acid 1.2 0.7 - 2.0 mmol/L   UA with Microscopic reflex to Culture    Collection Time: 08/30/22   7:30 PM    Specimen: Urine, NOS   Result Value Ref Range    Color Urine Light Yellow Colorless, Straw, Light Yellow, Yellow    Appearance Urine Clear Clear    Glucose Urine Negative Negative mg/dL    Bilirubin Urine Negative Negative    Ketones Urine 10  (A) Negative mg/dL    Specific Gravity Urine 1.044 (H) 1.001 - 1.030    Blood Urine 0.03 mg/dL (A) Negative    pH Urine 5.5 5.0 - 7.0    Protein Albumin Urine 10  (A) Negative mg/dL    Urobilinogen Urine <2.0 <2.0 mg/dL    Nitrite Urine Negative Negative    Leukocyte Esterase Urine 25 Alexia/uL (A) Negative    Bacteria Urine Few (A) None Seen /HPF    Mucus Urine Present (A) None Seen /LPF    RBC Urine 3 (H) <=2 /HPF    WBC Urine 2 <=5 /HPF    Squamous Epithelials Urine 1 <=1 /HPF   Glucose by meter    Collection Time: 08/31/22 12:59 AM   Result Value Ref Range    GLUCOSE BY METER POCT 90 70 - 99 mg/dL   Glucose by meter    Collection Time: 08/31/22  5:51 AM   Result Value Ref Range    GLUCOSE BY METER POCT 98 70 - 99 mg/dL   CBC with platelets    Collection Time: 08/31/22  5:57 AM   Result Value Ref Range    WBC Count 5.6 4.0 - 11.0 10e3/uL    RBC Count 5.19 3.80 - 5.20 10e6/uL    Hemoglobin 15.0 11.7 - 15.7 g/dL    Hematocrit 45.4 35.0 - 47.0 %    MCV 88 78 - 100 fL    MCH 28.9 26.5 - 33.0 pg    MCHC 33.0 31.5 - 36.5 g/dL    RDW 13.1 10.0 - 15.0 %    Platelet Count 227 150 - 450 10e3/uL   Basic metabolic panel    Collection Time: 08/31/22  5:57 AM   Result Value Ref Range    Sodium 140 136 - 145 mmol/L    Potassium 3.3 (L) 3.5 - 5.0 mmol/L    Chloride 104 98 - 107 mmol/L    Carbon Dioxide (CO2) 25 22 - 31 mmol/L    Anion Gap 11 5 - 18 mmol/L    Urea Nitrogen 11 8 - 22 mg/dL    Creatinine 0.94 0.60 - 1.10 mg/dL    Calcium 9.0 8.5 - 10.5 mg/dL    Glucose 97 70 - 125 mg/dL    GFR Estimate 66 >60 mL/min/1.73m2       Total time spent for face to face visit, reviewing labs/imaging studies, counseling and coordination of care was: 1 Hour 15 Minutes. More than 50% of this  time was spent on counseling and coordination of care.    Dragon software used in the dictation of this note.    Liza Alexander MD  [facility] - [address], [address]  [place] [address] [phone]

## 2022-08-31 NOTE — PLAN OF CARE
Goal Outcome Evaluation:        Arrived to the floor from ED. Alert. Slurred speech. Word finding difficulty.  Left sided weakness. Unable to hold arm or leg up at all. Facial droop. Mouth appears full of secretions. Did bedside swallow and she had no difficulty with swallowing.  No pain.  Also has a large mass on her left chest. This was saturated through 3 4x4 and an abd pad and pooled behind her on the bed.  Did pull back dressing and held pressure for 5 minutes. Oozing has subsided.  Non adherent, gauze and abd applied. Md notified.      Went to stat head CT for her increased weakness and facial droop.

## 2022-08-31 NOTE — PROGRESS NOTES
[facility]    Medicine Progress Note - Hospitalist Service    Date of Admission:  8/30/2022    Assessment & Plan            66 yo F with h/o dyslipidemia, HTN, breast mass, and GILDA who presented to ED 8/30/22 with left-sided partial hemiparesis and mild dysarthria/expressive aphasia and was found to have an acute ischemic stroke on MRI.  No intervention with either TPA or thrombectomy due to time of onset of symptoms and there were no large vessel occlusions seen on CTA.  Echo normal this morning. She was started on Plavix and aspirin and statin.  Continue PT/OT/speech.  We have been allowing permissive hypertension  up to 220 systolic and now will slowly bring her blood pressure down starting in the morning.     Principal Problem:    Acute ischemic stroke -as above, today she does seem more weak on left side than last evening and has a bit of facial droop this afternoon which I didn't appreciate yesterday - will get STAT head CT    Active Problems:    Left Breast mass - likely defer to outpatient workup, present for several months.  It started to bleed and soaked through 4-5 gauze pads this morning.  She now tells us that she has not seen anyone for this.  This is very concerning for undiagnosed breast cancer.  MRI of brain was done without and with contrast so metastic disease to brain is less likely.  Given bleeding will have surgery evaluate.    Hyperlipidemia -statin high-dose    Hypertension - have been allowing blood pressure to run high until now but will slowly start to bring it down - start low dose lisinopril this evening.    GILDA (obstructive sleep apnea) -she states she does not use CPAP so we will just follow this for now    Infection due to 2019 novel coronavirus -she has had very minimal cough and she is not hypoxic so she was started on 3 doses of prophylactic remdesivir as she is high risk for progression.         Diet: Regular Diet Adult Thin Liquids (level 0)    DVT  Prophylaxis: heparin subcut  Farooq Catheter: Not present  Central Lines: None  Cardiac Monitoring: ACTIVE order. Indication: Stroke, acute (48 hours)  Code Status: Full Code      Disposition Plan      Expected Discharge Date: 09/02/2022        Discharge Comments: to home, has COVID so if needs a TCU then will be here for 10 days.  Hopefully can get well enough to go home in next 1-2 days.        The patient's care was discussed with the Patient. And primary nurse    Vj Hamilton MD  Hospitalist [facility]  Securely message with the Vocera Web Console (learn more here)  Text page via [employer] Paging/Directory         Clinically Significant Risk Factors Present on Admission                          ______________________________________________________________________    Interval History   Feels ok -about the same but has had some bleeding from left breast mass this morning.    Data reviewed today: I reviewed all medications, new labs and imaging results over the last 24 hours.    Physical Exam   Vital Signs: Temp: 99.1  F (37.3  C) Temp src: Oral BP: (!) 190/107 Pulse: (!) 122   Resp: 22 SpO2: 93 % O2 Device: None (Room air)    Weight: 0 lbs 0 oz  General Appearance: Older F in NAD  Respiratory: CTA  Cardiovascular: RRR S1S2  GI: +BS, soft, NT/ND  Skin: left breast mass is purplish with a couple of open areas that are oozing blood  Other: Alert, CN2-12 has mild facial droop on the left today (new), motor 3/5 on LUE and LLE and 5/5 on Right side, speech is less fluent with more word finding difficulty     Data   Recent Labs   Lab 08/31/22  1140 08/31/22  0557 08/31/22  0551 08/31/22  0059 08/30/22  1438   WBC  --  5.6  --   --  8.7   HGB  --  15.0  --   --  15.6   MCV  --  88  --   --  85   PLT  --  227  --   --  276   INR  --   --   --   --  1.00   NA  --  140  --   --  137   POTASSIUM  --  3.3*  --   --  3.5   CHLORIDE  --  104  --   --  103   CO2  --  25  --   --  23   BUN  --  11   --   --  13   CR  --  0.94  --   --  1.01   ANIONGAP  --  11  --   --  11   ERNIE  --  9.0  --   --  9.7   * 97 98   < > 118   ALBUMIN  --   --   --   --  4.5   PROTTOTAL  --   --   --   --  7.8   BILITOTAL  --   --   --   --  0.5   ALKPHOS  --   --   --   --  109   ALT  --   --   --   --  24   AST  --   --   --   --  44*    < > = values in this interval not displayed.     Medications     - MEDICATION INSTRUCTIONS -       - MEDICATION INSTRUCTIONS -         remdesivir  100 mg Intravenous Q24H    And     sodium chloride 0.9%  50 mL Intravenous Q24H     aspirin  81 mg Oral Daily     atorvastatin  80 mg Oral QPM     clopidogrel  75 mg Oral Daily     heparin ANTICOAGULANT  5,000 Units Subcutaneous Q12H     labetalol  10 mg Intravenous Once     lisinopril  5 mg Oral At Bedtime

## 2022-08-31 NOTE — ED NOTES
Patient incontinent of urine. Brief changed. Patient repositioned.  Sitting up in bed eating lunch.

## 2022-08-31 NOTE — PROGRESS NOTES
Speech-Language Pathology: Clinical Swallow Evaluation  Speech-Language Pathology: Speech, Language, and Cognitive Evaluation     08/31/22 0800   General Information   Onset of Illness/Injury or Date of Surgery 08/30/22   Pertinent History of Current Problem Stephani Garcia is a 68 yo F with h/o dyslipidemia, HTN, and GILDA who presents with left-sided weakness in her left arm and left leg started yesterday afternoon.  She was able to walk but she felt like she had to hang onto things to stay steady.  She slept okay last night and this morning she woke up and still had the weakness and noticed increasing speech difficulty.  She had had some speech difficulty yesterday but very mild and intermittent.  She is having trouble word finding and a little bit of slurring of speech.  She denies any headaches or visual changes no scotoma or diplopia.  No hearing changes.  No loss of bowel or bladder control.  She never had a thing like this before.  She cannot think of anything that would have triggered this.  She has had a slight cough and is feeling a little rundown just recently but not very long.  No fevers or chills or nausea or vomiting or diarrhea or melena or hematochezia.   Past History of Dysphagia none   Type of Evaluation   Type of Evaluation Swallow Evaluation;Speech, Language, Cognition   Oral Motor   Oral Musculature generally intact   Dentition (Oral Motor)   Dentition (Oral Motor) adequate dentition   Facial Symmetry (Oral Motor)   Facial Symmetry (Oral Motor) left side impairment   Left Side Facial Asymmetry minimal impairment   Lip Function (Oral Motor)   Lip Range of Motion (Oral Motor) WNL   Lip Strength (Oral Motor) WNL;other (see comments)  (she did have mild drooling with eating/drinking on (L) though strength was noted to be intact [facility]. The drooling most likely occurring d/t decreased sensory input as she does have (L) labial weakness at rest.)   Tongue Function (Oral Motor)   Tongue  Strength (Oral Motor) WNL   Tongue ROM (Oral Motor) protrusion is impaired   Protrusion, Tongue ROM Impairment (Oral Motor) left side;minimal impairment  (mild deviation to (L))   Jaw Function (Oral Motor)   Jaw Function (Oral Motor) WNL   Cough/Swallow/Gag Reflex (Oral Motor)   Soft Palate/Velum (Oral Motor) not tested   Gag Reflex (Oral Motor) not tested   Volitional Throat Clear/Cough (Oral Motor) WNL   Volitional Swallow (Oral Motor) WNL   Comment, Cough/Swallow/Gag Reflex (Oral Motor) pt coughing without oral intake, she states it is because she hasn't had anything to eat.   Vocal Quality/Secretion Management (Oral Motor)   Vocal Quality (Oral Motor) WNL   General Swallowing Observations   Respiratory Support (General Swallowing Observations) nasal cannula   Current Diet/Method of Nutritional Intake (General Swallowing Observations, NIS) NPO   Swallowing Evaluation Clinical swallow evaluation   Clinical Swallow Evaluation   Feeding Assistance minimal assistance required   Clinical Swallow Evaluation Textures Trialed thin liquids;solid foods   Clinical Swallow Eval: Thin Liquid Texture Trial   Mode of Presentation, Thin Liquids self-fed;straw   Volume of Liquid or Food Presented 6 oz   Oral Phase of Swallow WFL   Oral Residue left lip drooling;other (see comments)  (at rest, not during drinking)   Pharyngeal Phase of Swallow other (see comments)  (1 cough noted during swallow when using a drink to clear saltine cracker oral residue.)   Clinical Swallow Evaluation: Solid Food Texture Trial   Mode of Presentation self-fed   Volume Presented saltine crackers   Oral Phase WFL   Pharyngeal Phase intact   Esophageal Phase of Swallow   Patient reports or presents with symptoms of esophageal dysphagia No   Swallowing Recommendations   Diet Consistency Recommendations thin liquids (level 0);regular diet   Medication Administration Recommendations, Swallowing (SLP) whole with water or in applesauce   Comment, Swallowing  Recommendations recommend regular diet with thin liquids. Will follow up during a meal as pt did have 1 episode coughing with thin liquids and there was mild drooling on the (L) that pt was not aware of d/t oral sensory deficit.   Speech   Vocal Loudness (Motor Speech) WNL   Speech Intelligibility (Motor Speech) WNL   Comment, Motor Speech Assessment There was just a slight imprecise consonant distortion(slurred speech)  noted at times. Pt is 100A% intelligible.   Speech Fluency (Motor Speech) WNL   Auditory Comprehension   Follows Commands (Auditory Comprehension) 3-step commands   Comment, Assessment (Auditory Comprehension) No auditory comprehension deficit noted   Yes/No Questions (Auditory Comprehension) WNL   3 Step, Follows Commands (Auditory Comprehension) intact   Verbal Expression   Comment, Assesment (Verbal Expression) No word finding deficit noted during structured and unstructured tasks   Confrontational Naming (Verbal Expression) WNL   Responsive Naming (Verbal Expression) WNL   Cognition   Cognitive Status Exam Comments Pt was oriented to date, , place and reason for admit independently   Clinical Impression   Criteria for Skilled Therapeutic Interventions Met (SLP Eval) Yes, treatment indicated   SLP Diagnosis dysphagia   Clinical Impression Comments Pt with mild dysphagia with recommedation for regular/thin diet and for speech to complete meal observation as she did have 1 cough with drinking and she does have a mild oral sensory deficit.   SLP Discharge Planning   SLP Discharge Recommendation home   SLP Rationale for DC Rec do not anticipate further ST upon dc   SLP Brief overview of current status  reg/thin diet    Total Evaluation Time   Total Evaluation Time (Minutes) 30   SLP Goals   Therapy Frequency (SLP Eval) daily   SLP Predicted Duration/Target Date for Goal Attainment 22   SLP Goals Swallow   SLP: Safely tolerate diet without signs/symptoms of aspiration Regular diet;Thin  liquids;With use of swallow precautions;Independently

## 2022-08-31 NOTE — ED NOTES
Unable to leave voicemail for spouse about floor transfer. Tried to call patient's son, phone number on chart is not correct.

## 2022-09-01 ENCOUNTER — APPOINTMENT (OUTPATIENT)
Dept: SPEECH THERAPY | Facility: HOSPITAL | Age: 67
DRG: 064 | End: 2022-09-01
Payer: COMMERCIAL

## 2022-09-01 ENCOUNTER — APPOINTMENT (OUTPATIENT)
Dept: OCCUPATIONAL THERAPY | Facility: HOSPITAL | Age: 67
DRG: 064 | End: 2022-09-01
Payer: COMMERCIAL

## 2022-09-01 ENCOUNTER — APPOINTMENT (OUTPATIENT)
Dept: PHYSICAL THERAPY | Facility: HOSPITAL | Age: 67
DRG: 064 | End: 2022-09-01
Payer: COMMERCIAL

## 2022-09-01 LAB — GLUCOSE BLDC GLUCOMTR-MCNC: 99 MG/DL (ref 70–99)

## 2022-09-01 PROCEDURE — 97530 THERAPEUTIC ACTIVITIES: CPT | Mod: GP

## 2022-09-01 PROCEDURE — 97112 NEUROMUSCULAR REEDUCATION: CPT | Mod: GP

## 2022-09-01 PROCEDURE — 120N000001 HC R&B MED SURG/OB

## 2022-09-01 PROCEDURE — 99222 1ST HOSP IP/OBS MODERATE 55: CPT | Mod: FS | Performed by: SPECIALIST

## 2022-09-01 PROCEDURE — 99233 SBSQ HOSP IP/OBS HIGH 50: CPT | Performed by: INTERNAL MEDICINE

## 2022-09-01 PROCEDURE — 97535 SELF CARE MNGMENT TRAINING: CPT | Mod: GO

## 2022-09-01 PROCEDURE — 258N000003 HC RX IP 258 OP 636: Performed by: INTERNAL MEDICINE

## 2022-09-01 PROCEDURE — 97112 NEUROMUSCULAR REEDUCATION: CPT | Mod: GO

## 2022-09-01 PROCEDURE — 99232 SBSQ HOSP IP/OBS MODERATE 35: CPT | Performed by: PSYCHIATRY & NEUROLOGY

## 2022-09-01 PROCEDURE — 92526 ORAL FUNCTION THERAPY: CPT | Mod: GN

## 2022-09-01 PROCEDURE — 250N000013 HC RX MED GY IP 250 OP 250 PS 637

## 2022-09-01 PROCEDURE — 250N000013 HC RX MED GY IP 250 OP 250 PS 637: Performed by: INTERNAL MEDICINE

## 2022-09-01 PROCEDURE — 250N000011 HC RX IP 250 OP 636: Performed by: INTERNAL MEDICINE

## 2022-09-01 RX ORDER — LISINOPRIL 5 MG/1
10 TABLET ORAL AT BEDTIME
Status: DISCONTINUED | OUTPATIENT
Start: 2022-09-01 | End: 2022-09-04

## 2022-09-01 RX ADMIN — GUAIFENESIN 200 MG: 200 TABLET ORAL at 10:32

## 2022-09-01 RX ADMIN — SODIUM CHLORIDE 50 ML: 9 INJECTION, SOLUTION INTRAVENOUS at 22:10

## 2022-09-01 RX ADMIN — HEPARIN SODIUM 5000 UNITS: 5000 INJECTION, SOLUTION INTRAVENOUS; SUBCUTANEOUS at 00:56

## 2022-09-01 RX ADMIN — LISINOPRIL 10 MG: 5 TABLET ORAL at 21:34

## 2022-09-01 RX ADMIN — CLOPIDOGREL BISULFATE 75 MG: 75 TABLET ORAL at 10:23

## 2022-09-01 RX ADMIN — HEPARIN SODIUM 5000 UNITS: 5000 INJECTION, SOLUTION INTRAVENOUS; SUBCUTANEOUS at 13:14

## 2022-09-01 RX ADMIN — Medication 81 MG: at 10:22

## 2022-09-01 RX ADMIN — REMDESIVIR 100 MG: 100 INJECTION, POWDER, LYOPHILIZED, FOR SOLUTION INTRAVENOUS at 21:34

## 2022-09-01 RX ADMIN — ATORVASTATIN CALCIUM 80 MG: 40 TABLET, FILM COATED ORAL at 21:33

## 2022-09-01 ASSESSMENT — ACTIVITIES OF DAILY LIVING (ADL)
ADLS_ACUITY_SCORE: 36
ADLS_ACUITY_SCORE: 22
ADLS_ACUITY_SCORE: 32
ADLS_ACUITY_SCORE: 22
ADLS_ACUITY_SCORE: 28
ADLS_ACUITY_SCORE: 32
ADLS_ACUITY_SCORE: 32
ADLS_ACUITY_SCORE: 22
ADLS_ACUITY_SCORE: 32
ADLS_ACUITY_SCORE: 22

## 2022-09-01 NOTE — PROGRESS NOTES
[facility]    Medicine Progress Note - Hospitalist Service    Date of Admission:  8/30/2022    Assessment & Plan              66 yo F with h/o dyslipidemia, HTN, breast mass, and GILDA who presented to ED 8/30/22 with left-sided partial hemiparesis and mild dysarthria/expressive aphasia and was found to have an acute ischemic stroke on MRI.  No intervention with either TPA or thrombectomy due to time of onset of symptoms and there were no large vessel occlusions seen on CTA.  Echo normal. She was started on Plavix and aspirin and statin.  Continue PT/OT/speech.  Continue to slowly bring BP down. Left breast mass being evaluated by surgery but they feel it is unlikely she will be a surgical candidate.     Principal Problem:    Acute ischemic stroke -as above, she is weaker again on the left side with more pronounced left facial droop.  Speech seems a little bit better but still with word-finding difficulty.  Continue ASA, plavix, statin as per neurology.      Active Problems:    Left Breast mass - likely defer to outpatient workup, present for several months.  It started to bleed and soaked through 4-5 gauze pads yesterday but is not bleeding today as much.  She has not seen anyone for this.  This is very concerning for undiagnosed breast cancer.  MRI of brain was done without and with contrast so metastic disease to brain is less likely.  Surgery does not feel that a surgical approach will be an option.  They are recommending oncology consult but they will attempt to do an FNA.    Hyperlipidemia -statin high-dose    Hypertension - initially allowed blood pressure to run high first 24 hours but starting to bring it down slowly - started low dose lisinopril 8/31 - will increase dose.    GILDA (obstructive sleep apnea) -she states she does not use CPAP so we will just follow this for now    Infection due to 2019 novel coronavirus -she has had very minimal cough and she is not hypoxic so she was  started on 3 doses of prophylactic remdesivir as she is high risk for progression.         Diet: Regular Diet Adult Thin Liquids (level 0)  Room Service    DVT Prophylaxis: heparin subcut  Farooq Catheter: Not present  Central Lines: None  Cardiac Monitoring: ACTIVE order. Indication: Stroke, acute (48 hours)  Code Status: Full Code      Disposition Plan      Expected Discharge Date: 09/02/2022    Discharge Delays: Isolation - find facility that will accept  Placement - LTAC/ARU    Discharge Comments: to home, has COVID so if needs a TCU then will be here for 10 days.  given worsening of stroke symptoms today going home is less likely        The patient's care was discussed with the Patient. And primary nurse    Vj Hamilton MD  Hospitalist Service  [facility]  Securely message with the Vocera Web Console (learn more here)  Text page via [employer] Paging/Directory         Clinically Significant Risk Factors Present on Admission                      ______________________________________________________________________    Interval History   Feels ok -about the same but has had some bleeding from left breast mass this morning.    Data reviewed today: I reviewed all medications, new labs and imaging results over the last 24 hours.    Physical Exam   Vital Signs: Temp: 99.6  F (37.6  C) Temp src: Oral BP: (!) 171/88 Pulse: 104   Resp: 20 SpO2: 95 % O2 Device: None (Room air)    Weight: 0 lbs 0 oz  General Appearance: Older F in NAD  Respiratory: CTA  Cardiovascular: RRR S1S2  GI: +BS, soft, NT/ND  Skin: left breast mass is purplish with a couple of open areas that are oozing blood  Other: Alert, CN2-12 has facial droop on the left, motor 0/5 on LUE and 2/5 LLE and 5/5 on Right side, speech still with word finding difficulty     Data   Recent Labs   Lab 09/01/22  0852 08/31/22  2107 08/31/22  1744 08/31/22  1140 08/31/22  0557 08/31/22  0059 08/30/22  1438   WBC  --   --   --   --  5.6  --  8.7   HGB   --   --   --   --  15.0  --  15.6   MCV  --   --   --   --  88  --  85   PLT  --   --   --   --  227  --  276   INR  --   --   --   --   --   --  1.00   NA  --   --   --   --  140  --  137   POTASSIUM  --   --   --   --  3.3*  --  3.5   CHLORIDE  --   --   --   --  104  --  103   CO2  --   --   --   --  25  --  23   BUN  --   --   --   --  11  --  13   CR  --   --   --   --  0.94  --  1.01   ANIONGAP  --   --   --   --  11  --  11   ERNIE  --   --   --   --  9.0  --  9.7   GLC 99 110* 110*   < > 97   < > 118   ALBUMIN  --   --   --   --   --   --  4.5   PROTTOTAL  --   --   --   --   --   --  7.8   BILITOTAL  --   --   --   --   --   --  0.5   ALKPHOS  --   --   --   --   --   --  109   ALT  --   --   --   --   --   --  24   AST  --   --   --   --   --   --  44*    < > = values in this interval not displayed.     Medications     - MEDICATION INSTRUCTIONS -       - MEDICATION INSTRUCTIONS -         remdesivir  100 mg Intravenous Q24H    And     sodium chloride 0.9%  50 mL Intravenous Q24H     aspirin  81 mg Oral Daily     atorvastatin  80 mg Oral QPM     clopidogrel  75 mg Oral Daily     heparin ANTICOAGULANT  5,000 Units Subcutaneous Q12H     labetalol  10 mg Intravenous Once     lisinopril  5 mg Oral At Bedtime

## 2022-09-01 NOTE — PLAN OF CARE
Problem: Cognitive Impairment (Stroke, Ischemic/Transient Ischemic Attack)  Goal: Optimal Cognitive Function  Outcome: Ongoing, Progressing     Problem: Cerebral Tissue Perfusion (Stroke, Ischemic/Transient Ischemic Attack)  Goal: Optimal Cerebral Tissue Perfusion  Outcome: Ongoing, Progressing     Problem: Plan of Care - These are the overarching goals to be used throughout the patient stay.    Goal: Optimal Comfort and Wellbeing  Outcome: Ongoing, Progressing   Goal Outcome Evaluation:    Alert and oriented, slow response. NIH scored 10 (right sided weakness, speech slurred, difficulty finding words, left facial droop). Nonproductive cough. Continuous pulse oximeter. Tele monitoring with HYACINTH Luevano.  Assist of 2. IV saline lock. Denies pain. Aspirations and fall precautions.  Last blood pressure 147/65 and Temperature 99. Q 4 neuros,

## 2022-09-01 NOTE — PROVIDER NOTIFICATION
Resident notified about patient's temperature of 100.5. Patient's temperature at start of shift was 100.4; tylenol given and temperature went down to 99.4. Now back to 100.5. Ibuprofen ordered.

## 2022-09-01 NOTE — PLAN OF CARE
Problem: Communication Impairment (Stroke, Ischemic/Transient Ischemic Attack)  Goal: Improved Communication Skills  8/31/2022 2331 by Mita Taylor RN  Outcome: Ongoing, Not Progressing  8/31/2022 2325 by Mita Taylor RN  Outcome: Ongoing, Progressing     Problem: Functional Ability Impaired (Stroke, Ischemic/Transient Ischemic Attack)  Goal: Optimal Functional Ability  8/31/2022 2331 by Mita Taylor RN  Outcome: Ongoing, Not Progressing  8/31/2022 2325 by Mita Taylor RN  Outcome: Ongoing, Not Progressing  Intervention: Optimize Functional Ability  Recent Flowsheet Documentation  Taken 8/31/2022 1948 by Mita Taylor RN  Activity Management: bedrest  Taken 8/31/2022 1555 by Mita Taylor RN  Activity Management: bedrest     Problem: Cognitive Impairment (Stroke, Ischemic/Transient Ischemic Attack)  Goal: Optimal Cognitive Function  8/31/2022 2331 by Mita Taylor RN  Outcome: Ongoing, Progressing  8/31/2022 2325 by Mita Taylor RN  Outcome: Ongoing, Progressing     Problem: Respiratory Compromise (Stroke, Ischemic/Transient Ischemic Attack)  Goal: Effective Oxygenation and Ventilation  8/31/2022 2331 by Mita Taylor RN  Outcome: Ongoing, Progressing  8/31/2022 2325 by Mita Taylor RN  Outcome: Ongoing, Progressing  Intervention: Optimize Oxygenation and Ventilation  Recent Flowsheet Documentation  Taken 8/31/2022 1948 by Mita Taylor RN  Head of Bed (HOB) Positioning: HOB at 15 degrees  Taken 8/31/2022 1555 by Mita Taylor RN  Head of Bed (HOB) Positioning: HOB at 60-90 degrees     Problem: Swallowing Impairment (Stroke, Ischemic/Transient Ischemic Attack)  Goal: Optimal Eating and Swallowing without Aspiration  Outcome: Ongoing, Progressing   Goal Outcome Evaluation:      Patient alert and oriented x 4. Scored 11 on NIH for left extremity weakness, sensation, facial droop, word-finding difficulty, and slurred speech. Sinus tachycardia with BBB on telemetry. PRN hydralazine given for SBP  >220; recheck was 182/111. Tylenol and Ibuprofen given for elevated temperature. Last temperature check was 99.0. Left chest dressing clean, dry, and intact. Denied pain.

## 2022-09-01 NOTE — PROGRESS NOTES
Chart assessed, lives w/family in duplex and independent & A&Ox4 at baseline, will need WC/walker for home at discharge, no to TCU but home w/homecare.

## 2022-09-01 NOTE — PROGRESS NOTES
I attempted to see the patient yesterday and again today without success as she was in radiology and then working with PT.  Will attempt again tomorrow to do an FNA of this mass. Based on the picture I do NOT feel she will be a candidate for surgery. Referral to Oncology should be placed.    Nimo Flowers MD

## 2022-09-01 NOTE — PLAN OF CARE
Problem: Plan of Care - These are the overarching goals to be used throughout the patient stay.    Goal: Plan of Care Review/Shift Note  Description: The Plan of Care Review/Shift note should be completed every shift.  The Outcome Evaluation is a brief statement about your assessment that the patient is improving, declining, or no change.  This information will be displayed automatically on your shift note.  Outcome: Ongoing, Progressing   Goal Outcome Evaluation:           Continue to monitor neuro. Status and perform N.I.H.S.S. every 4 hours. Covid precautions.  Problem: Plan of Care - These are the overarching goals to be used throughout the patient stay.    Goal: Absence of Hospital-Acquired Illness or Injury  Intervention: Prevent and Manage VTE (Venous Thromboembolism) Risk  Recent Flowsheet Documentation  Taken 9/1/2022 2474 by Martina Gonzalez RN  VTE Prevention/Management: SCDs (sequential compression devices) on     Instructed on ankle pumps.  Problem: Adjustment to Illness (Stroke, Ischemic/Transient Ischemic Attack)  Goal: Optimal Coping  Outcome: Ongoing, Progressing   Questioned about her stroke symptoms and if she has any questions about the education she has received. States she feels she has improved since yesterday.  Problem: Communication Impairment (Stroke, Ischemic/Transient Ischemic Attack)  Goal: Improved Communication Skills  Outcome: Ongoing, Progressing   Speech is slurred and slow.

## 2022-09-01 NOTE — PROGRESS NOTES
NEUROLOGY PROGRESS NOTE     ASSESSMENT & PLAN     Diagnosis: Subacute strokes     67 year old female admitted for subacute strokes.  The patient presented to the [facility] emergency room on 8.30.22 for evaluation of language impairment and left-sided weakness.  The weakness had reportedly started the day prior to presentation, the word finding difficulty that morning upon waking.  Patient has additional history of vertigo, hypertension and hyperlipidemia.         CT head negative for acute pathology.      CTA shows no large vessel occlusion or significant stenosis, 30% stenosis of right ICA.    Repeat head CT for new facial drooping showed nothing acute.  She is a little weaker than yesterday as well, likely evolution of her stroke.    MRI brain revealed small cluster of recent infarcts in the right frontal lobe and right basal ganglia.    No thrombolysis due to timeframe of symptoms.    Echocardiogram unremarkable.    Patient is COVID-positive, on remdesivir.    UA abnormal.  Management per primary team.    Loaded with Plavix, started on daily Plavix 75mg and aspirin 81mg.  Aspirin monotherapy: 325mg daily after 90 days.    , high-dose Lipitor initiated. Continue.    Hemoglobin A1c 5.7%, management per primary team for euglycemia.      Telemetry, neurochecks.  Permissive hypertension, treat SBP>220.    Untreated GILDA?  Patient denies symptoms.  May need reevaluation as outpatient.    PT/OT/SLP.    Stroke education.    Breast mass.  Plan for follow-up per Surgery recs.    Neurology will follow along.     Neurology Discharge Planning: PT recommends [facility].    Patient Active Problem List    Diagnosis Date Noted     Acute ischemic stroke (H) 08/30/2022     Priority: Medium     Infection due to 2019 novel coronavirus 08/30/2022     Priority: Medium     GILDA (obstructive sleep apnea) 09/07/2011     Priority: Medium     Formatting of this note might be different from the original.  Split-Night Polysomnogram  "Interpretation    Date of read:  9/7/2011   Estimated Body mass index is 26.94 kg/(m^2) as calculated from the following:    Height as of 8/19/11: 5' 7\"(1.702 m).    Weight as of 8/19/11: 172 lb(78.019 kg).  Total Ozan Score: (not recorded)  Neck Circumference (in inches): 13     Baseline:  This study was performed in order to evaluate for GILDA.  Her snoring has become worse recently. Her sleep is non-restorative.   Hypopnea Definition: Rule VIII.4.A  Due to the presence of respiratory events, this study was done in two parts (baseline, followed by titration).  The total sleep time was: 141.6  minutes.  The sleep latency was: 0  minutes, which is decreased.  The REM sleep latency was: 112.4  minutes.  Sleep efficiency was: 96.9  % which is normal.  The sleep architecture was disrupted with frequent sleep stage changes and arousals.  Snoring reported as: moderately loud;continuous.  Respiratory Events:  Obstructive Apneas: 2 ,  Central Apneas: 0 , Mixed Apneas: 0 , Hypopneas: 98 , R; GILDA (obstructive sleep apnea)-Severe (AHI 42)       Hyperlipidemia 12/07/2009     Priority: Medium     Hypertension 12/07/2009     Priority: Medium     Medical History  Past Medical History:   Diagnosis Date     Hyperlipidemia 12/7/2009     Hypertension 12/7/2009     GILDA (obstructive sleep apnea) 9/7/2011    Formatting of this note might be different from the original. Split-Night Polysomnogram Interpretation  Date of read:  9/7/2011  Estimated Body mass index is 26.94 kg/(m^2) as calculated from the following:   Height as of 8/19/11: 5' 7\"(1.702 m).   Weight as of 8/19/11: 172 lb(78.019 kg). Total Ozan Score: (not recorded) Neck Circumference (in inches): 13   Baseline: This study was performed in        SUBJECTIVE     Patient had mild fever overnight.    Stephani says she is doing okay.  She says she gets a little bit confused.  Left side is still weak.     OBJECTIVE     Vital signs in last 24 hours  Temp:  [98.3  F (36.8  C)-101.2 "  F (38.4  C)] 99  F (37.2  C)  Pulse:  [] 101  Resp:  [18-28] 20  BP: (142-224)/() 147/85  SpO2:  [93 %-95 %] 93 %    Weight:   [unfilled]    Review of Systems   Pertinent items are noted in HPI.    General Physical Exam: Patient is alert and oriented x 3. Vital signs were reviewed and are documented in EMR. Neck was supple.  No carotid bruit, thyromegaly, JVD or lymphadenopathy noted.  Neurological Exam:  Mental status: Attentive, interactive.  Speech: Some word finding difficulties, dysarthria.  Cranial nerves: 2-12 intact other than palate, not visualized and some facial asymmetry today.  Motor: 3-/5 in the left upper extremity, 4-/5 in the left lower extremity.  Strength appears full on the right.  Sensory: Intact light touch throughout, though patient reports sensation is lighter on the left side.  Some neglect on the left.  Coordination: Clumsiness with finger tapping on the left.  Normal on the right.  Gait: Deferred for safety.        DIAGNOSTIC STUDIES     Pertinent Radiology   Radiology Results: Reviewed by me.    Head CT:  IMPRESSION:  1.  Redemonstrated acute to subacute infarction right basal ganglia and corona radiata. No acute hemorrhage.  2.  Moderate to advanced chronic microvascular ischemic changes.    Echocardiogram:  Interpretation Summary     1. Normal left ventricular size and systolic performance with a visually  estimated ejection fraction of 65%.  2. No significant valvular heart disease is identified on this study.  3. Normal right ventricular size and systolic performance.    Pertinent Labs   Lab Results: personally reviewed.   Recent Results (from the past 24 hour(s))   Glucose by meter    Collection Time: 08/31/22 11:40 AM   Result Value Ref Range    GLUCOSE BY METER POCT 100 (H) 70 - 99 mg/dL   Glucose by meter    Collection Time: 08/31/22  5:44 PM   Result Value Ref Range    GLUCOSE BY METER POCT 110 (H) 70 - 99 mg/dL   Glucose by meter    Collection Time: 08/31/22  9:07  PM   Result Value Ref Range    GLUCOSE BY METER POCT 110 (H) 70 - 99 mg/dL         HOSPITAL MEDICATIONS       remdesivir  100 mg Intravenous Q24H    And     sodium chloride 0.9%  50 mL Intravenous Q24H     aspirin  81 mg Oral Daily     atorvastatin  80 mg Oral QPM     clopidogrel  75 mg Oral Daily     heparin ANTICOAGULANT  5,000 Units Subcutaneous Q12H     labetalol  10 mg Intravenous Once     lisinopril  5 mg Oral At Bedtime        Total time spent for face to face visit, reviewing labs/imaging studies, counseling and coordination of care was: 30 Minutes. More than 50% of this time was spent on counseling and coordination of care.    Dragon software used in the dictation on this note.    Liza Alexander MD  [facility] - [address], [address] [place] [address]

## 2022-09-01 NOTE — CONSULTS
"General Surgery Consultation  Stepahni Garcia MRN# [id number]   Age/Sex: 67 year old female YOB: 1955     Reason for consult: 1. Stroke-like symptom    2. Infection due to 2019 novel coronavirus    3. Left breast mass            Requesting physician: Joy                   Assessment and Plan:   Assessment:  Breast mass suspicious for cancer. This has grown quickly and has begun to bleed. The mass is causing skin compromise and has started to bleed.    Plan:  Will need a needle biopsy; will discuss timing with Dr. Flowers  Will also need a PET scan but this will need to be done as an outpatient and after she recovers from Covid  Continue to use dry gauze dressings for drainage          Chief Complaint:     Chief Complaint   Patient presents with     Stroke Symptoms     Tier 2 Stroke Code        History is obtained from the patient    HPI:   Stephani Garcia is a 67 year old female who presents a large breast mass on her left breast. Patient was admitted for CVA in the setting of Covid infection. Mass was noted by [facility] and we were asked to evaluate. Patient reports this mass started small like a \"pimple\" but had nothing \"in it\". This has continued to grow over the past 4-6 weeks. Patient reports no pain or discharge before it started bleeding yesterday. No family history of breast cancer that she is aware of. Both parents had lung cancer.            Past Medical History:     Past Medical History:   Diagnosis Date     Hyperlipidemia 12/7/2009     Hypertension 12/7/2009     GILDA (obstructive sleep apnea) 9/7/2011    Formatting of this note might be different from the original. Split-Night Polysomnogram Interpretation  Date of read:  9/7/2011  Estimated Body mass index is 26.94 kg/(m^2) as calculated from the following:   Height as of 8/19/11: 5' 7\"(1.702 m).   Weight as of 8/19/11: 172 lb(78.019 kg). Total [place] Score: (not recorded) Neck Circumference (in inches): 13   Baseline: This study was performed " in              Past Surgical History:     Past Surgical History:   Procedure Laterality Date      SECTION               Social History:    reports that she has never smoked. She does not have any smokeless tobacco history on file. She reports current alcohol use of about 1.0 standard drink of alcohol per week. She reports that she does not use drugs.           Family History:     Family History   Problem Relation Age of Onset     Hyperlipidemia Mother      Lung Cancer Mother      Cataracts Father      Lung Cancer Father               Allergies:     Allergies   Allergen Reactions     Sulfa Drugs Headache     Tetracyclines Hives     Occurred many years ago.               Medications:     Prior to Admission medications    Not on File              Review of Systems:   The Review of Systems is negative other than noted in the HPI            Physical Exam:     Patient Vitals for the past 24 hrs:   BP Temp Temp src Pulse Resp SpO2   22 0845 (!) 145/88 98.5  F (36.9  C) Oral 98 18 96 %   22 0341 (!) 147/85 99  F (37.2  C) Oral 101 20 93 %   22 0056 (!) 146/78 99  F (37.2  C) Oral 97 18 95 %   22 2113 (!) 188/92 99  F (37.2  C) Oral -- -- --   22 1907 (!) 142/78 (!) 100.5  F (38.1  C) Oral 120 18 94 %   22 1820 (!) 166/88 99.4  F (37.4  C) Oral -- -- --   22 1746 (!) 182/111 (!) 101.2  F (38.4  C) Oral -- -- --   22 1703 (!) 221/114 -- -- -- -- --   22 1553 (!) 194/105 -- -- -- -- --   22 1550 (!) 218/119 100.4  F (38  C) Oral 118 25 95 %   22 1404 (!) 190/107 99.1  F (37.3  C) Oral (!) 122 22 93 %   22 1141 (!) 201/106 100.4  F (38  C) Oral 113 28 94 %   22 1115 -- -- -- 114 27 94 %   22 1100 (!) 224/114 -- -- 117 23 93 %   22 1045 -- -- -- (!) 124 -- 94 %   22 1030 -- -- -- 118 -- 93 %   22 1015 -- -- -- (!) 126 -- 94 %          Intake/Output Summary (Last 24 hours) at 2022 0906  Last data filed at 2022  0440  Gross per 24 hour   Intake 800 ml   Output 150 ml   Net 650 ml      Constitutional:   awake, alert, cooperative, no apparent distress, and appears stated age       Eyes:   PERRL, conjunctiva/corneas clear, EOM's intact; no scleral edema or icterus noted        ENT:   Normocephalic, without obvious abnormality, atraumatic, Lips, mucosa, and tongue normal        Lungs:   Normal respiratory effort, no accessory muscle use       Cardiovascular:   Regular rate and rhythm       Abdomen:   Soft , not distended, nontender       Musculoskeletal:   No obvious swelling, bruising or deformity       Skin:   Firm, nontender, not movable mass on anterior left breast, superficial skin breakdown with bleeding noted                   Data:         All imaging studies reviewed by me.    Results for orders placed or performed during the hospital encounter of 22 (from the past 24 hour(s))   Echocardiogram Complete - For age > 60 yrs    Narrative    [id number]  [id number]  [id number]  [id number]^JENELLE^[place] [address]     Name: LOWELL PERSAUD  MRN: [id number]  : 1955  Study Date: 2022 11:17 AM  Age: 67 yrs  Gender: Female  Patient [facility]  Reason For Study: Cerebrovascular Incident  Ordering Physician: SAVITA ALMANZAR  Referring Physician: SAVITA ALMANZAR  Performed By: BRENNA     BSA: 1.9 m2  Height: 67 in  Weight: 172 lb  HR: 111  ______________________________________________________________________________  Procedure  Complete Echo Adult.  ______________________________________________________________________________  Interpretation Summary     1. Normal left ventricular size and systolic performance with a visually  estimated ejection fraction of 65%.  2. No significant valvular heart disease is identified on this study.  3. Normal right ventricular size and systolic  performance.  ______________________________________________________________________________  Left ventricle:  Normal left ventricular size and systolic performance with a visually  estimated ejection fraction of 65%. There is normal regional wall motion. Left  ventricular wall thickness is normal.     Assessment of LV Diastolic Function: The cumulative findings suggest normal  diastolic filling [The septal e' velocity is < 7 cm/s & lateral e' velocity  is  < 10 cm/s. The average E/e' is < 14. The TR velocity cannot be determined due  to insufficient tricuspid insufficiency signal. Left atrial volume index is  less than 34 mL/mÂ ].     Right ventricle:  Normal right ventricular size and systolic performance.     Left atrium:  The left atrium is of normal size.     Right atrium:  The right atrium is of normal size.     IVC:  The IVC is not well-visualized.     Aortic valve:  The aortic valve is not well visualized, but suspected to be comprised of  three cusps. No significant aortic stenosis or aortic insufficiency is  detected on this study.     Mitral valve:  The mitral valve appears morphologically normal. There is trace mitral  insufficiency.     Tricuspid valve:  The tricuspid valve is grossly morphologically normal. There is trace  tricuspid insufficiency.     Pulmonic valve:  The pulmonic valve is grossly morphologically normal.     Thoracic aorta:  The aortic root and proximal ascending aorta are of normal dimension.     Pericardium:  There is no significant pericardial effusion.  ______________________________________________________________________________  ______________________________________________________________________________  MMode/2D Measurements & Calculations  IVSd: 1.1 cm  LVIDd: 3.8 cm  LVIDs: 2.1 cm  LVPWd: 1.1 cm  FS: 44.8 %  LV mass(C)d: 141.0 grams  LV mass(C)dI: 74.4 grams/m2  Ao root diam: 3.2 cm  LA dimension: 2.7 cm  asc Aorta Diam: 3.1 cm  LA/Ao: 0.87  LVOT diam: 1.9 cm  LVOT area:  2.7 cm2  LA Volume Indexed (AL/bp): 31.2 ml/m2     RWT: 0.57     Time Measurements  MM HR: 109.0 BPM     Doppler Measurements & Calculations  MV E max monico: 50.4 cm/sec  MV A max monico: 108.2 cm/sec  MV E/A: 0.47  MV dec slope: 298.5 cm/sec2  MV dec time: 0.17 sec  Ao V2 max: 130.3 cm/sec  Ao max P.0 mmHg  Ao V2 mean: 99.8 cm/sec  Ao mean P.4 mmHg  Ao V2 VTI: 19.0 cm  JENNIFER(I,D): 2.6 cm2  JENNIFER(V,D): 2.3 cm2  LV V1 max P.8 mmHg  LV V1 max: 109.6 cm/sec  LV V1 VTI: 17.9 cm  SV(LVOT): 49.0 ml  SI(LVOT): 25.8 ml/m2  AV Monico Ratio (DI): 0.84  JENNIFER Index (cm2/m2): 1.4  E/E' av.1  Lateral E/e': 8.4  Medial E/e': 9.8     ______________________________________________________________________________  Report approved by: Raissa Drake 2022 12:32 PM         Glucose by meter   Result Value Ref Range    GLUCOSE BY METER POCT 100 (H) 70 - 99 mg/dL   CT Head w/o Contrast    Narrative    EXAM: CT HEAD W/O CONTRAST  LOCATION: [facility]  DATE/TIME: 2022 3:34 PM    INDICATION: Stroke with worsening symptoms.  COMPARISON: Brain MRI 2022.  TECHNIQUE: Routine CT Head without IV contrast. Multiplanar reformats. Dose reduction techniques were used.    FINDINGS:  INTRACRANIAL CONTENTS: No intracranial hemorrhage, extraaxial collection, or mass effect.  Acute to subacute infarction right basal ganglia and corona radiata. Chronic lacunar infarctions within the thalami. Moderate to advanced presumed chronic small   vessel ischemic changes. Normal ventricles and sulci.     VISUALIZED ORBITS/SINUSES/MASTOIDS: No intraorbital abnormality. No paranasal sinus mucosal disease. No middle ear or mastoid effusion.    BONES/SOFT TISSUES: No acute abnormality.      Impression    IMPRESSION:  1.  Redemonstrated acute to subacute infarction right basal ganglia and corona radiata. No acute hemorrhage.  2.  Moderate to advanced chronic microvascular ischemic changes.   Glucose by meter   Result  Value Ref Range    GLUCOSE BY METER POCT 110 (H) 70 - 99 mg/dL   Glucose by meter   Result Value Ref Range    GLUCOSE BY METER POCT 110 (H) 70 - 99 mg/dL   Glucose by meter   Result Value Ref Range    GLUCOSE BY METER POCT 99 70 - 99 mg/dL        Laurie Shipley, APRN CNP

## 2022-09-02 ENCOUNTER — APPOINTMENT (OUTPATIENT)
Dept: RADIOLOGY | Facility: HOSPITAL | Age: 67
DRG: 064 | End: 2022-09-02
Attending: INTERNAL MEDICINE
Payer: COMMERCIAL

## 2022-09-02 ENCOUNTER — APPOINTMENT (OUTPATIENT)
Dept: OCCUPATIONAL THERAPY | Facility: HOSPITAL | Age: 67
DRG: 064 | End: 2022-09-02
Payer: COMMERCIAL

## 2022-09-02 ENCOUNTER — APPOINTMENT (OUTPATIENT)
Dept: SPEECH THERAPY | Facility: HOSPITAL | Age: 67
DRG: 064 | End: 2022-09-02
Payer: COMMERCIAL

## 2022-09-02 ENCOUNTER — APPOINTMENT (OUTPATIENT)
Dept: PHYSICAL THERAPY | Facility: HOSPITAL | Age: 67
DRG: 064 | End: 2022-09-02
Payer: COMMERCIAL

## 2022-09-02 LAB
ANION GAP SERPL CALCULATED.3IONS-SCNC: 11 MMOL/L (ref 5–18)
BUN SERPL-MCNC: 17 MG/DL (ref 8–22)
CALCIUM SERPL-MCNC: 9 MG/DL (ref 8.5–10.5)
CHLORIDE BLD-SCNC: 105 MMOL/L (ref 98–107)
CO2 SERPL-SCNC: 21 MMOL/L (ref 22–31)
CREAT SERPL-MCNC: 0.77 MG/DL (ref 0.6–1.1)
GFR SERPL CREATININE-BSD FRML MDRD: 84 ML/MIN/1.73M2
GLUCOSE BLD-MCNC: 90 MG/DL (ref 70–125)
GLUCOSE BLDC GLUCOMTR-MCNC: 100 MG/DL (ref 70–99)
GLUCOSE BLDC GLUCOMTR-MCNC: 112 MG/DL (ref 70–99)
GLUCOSE BLDC GLUCOMTR-MCNC: 93 MG/DL (ref 70–99)
HGB BLD-MCNC: 15.1 G/DL (ref 11.7–15.7)
PLATELET # BLD AUTO: 256 10E3/UL (ref 150–450)
POTASSIUM BLD-SCNC: 3.4 MMOL/L (ref 3.5–5)
SODIUM SERPL-SCNC: 137 MMOL/L (ref 136–145)

## 2022-09-02 PROCEDURE — 250N000011 HC RX IP 250 OP 636: Performed by: INTERNAL MEDICINE

## 2022-09-02 PROCEDURE — 80048 BASIC METABOLIC PNL TOTAL CA: CPT | Performed by: INTERNAL MEDICINE

## 2022-09-02 PROCEDURE — 92526 ORAL FUNCTION THERAPY: CPT | Mod: GN

## 2022-09-02 PROCEDURE — 120N000001 HC R&B MED SURG/OB

## 2022-09-02 PROCEDURE — 97112 NEUROMUSCULAR REEDUCATION: CPT | Mod: GO

## 2022-09-02 PROCEDURE — 97110 THERAPEUTIC EXERCISES: CPT | Mod: GP

## 2022-09-02 PROCEDURE — 36415 COLL VENOUS BLD VENIPUNCTURE: CPT | Performed by: INTERNAL MEDICINE

## 2022-09-02 PROCEDURE — 0HBU3ZX EXCISION OF LEFT BREAST, PERCUTANEOUS APPROACH, DIAGNOSTIC: ICD-10-PCS | Performed by: SPECIALIST

## 2022-09-02 PROCEDURE — 88173 CYTOPATH EVAL FNA REPORT: CPT | Mod: 26 | Performed by: PATHOLOGY

## 2022-09-02 PROCEDURE — 250N000013 HC RX MED GY IP 250 OP 250 PS 637: Performed by: INTERNAL MEDICINE

## 2022-09-02 PROCEDURE — 99232 SBSQ HOSP IP/OBS MODERATE 35: CPT | Performed by: HOSPITALIST

## 2022-09-02 PROCEDURE — 97535 SELF CARE MNGMENT TRAINING: CPT | Mod: GO

## 2022-09-02 PROCEDURE — 97112 NEUROMUSCULAR REEDUCATION: CPT | Mod: GP

## 2022-09-02 PROCEDURE — 85018 HEMOGLOBIN: CPT | Performed by: INTERNAL MEDICINE

## 2022-09-02 PROCEDURE — 99232 SBSQ HOSP IP/OBS MODERATE 35: CPT | Performed by: PSYCHIATRY & NEUROLOGY

## 2022-09-02 PROCEDURE — 92611 MOTION FLUOROSCOPY/SWALLOW: CPT | Mod: GN

## 2022-09-02 PROCEDURE — 97530 THERAPEUTIC ACTIVITIES: CPT | Mod: GP

## 2022-09-02 PROCEDURE — 85049 AUTOMATED PLATELET COUNT: CPT | Performed by: INTERNAL MEDICINE

## 2022-09-02 PROCEDURE — 74230 X-RAY XM SWLNG FUNCJ C+: CPT

## 2022-09-02 PROCEDURE — 88360 TUMOR IMMUNOHISTOCHEM/MANUAL: CPT | Mod: 26 | Performed by: PATHOLOGY

## 2022-09-02 PROCEDURE — 88173 CYTOPATH EVAL FNA REPORT: CPT | Mod: TC | Performed by: SPECIALIST

## 2022-09-02 PROCEDURE — 88305 TISSUE EXAM BY PATHOLOGIST: CPT | Mod: 26 | Performed by: PATHOLOGY

## 2022-09-02 RX ORDER — BARIUM SULFATE 400 MG/ML
SUSPENSION ORAL ONCE
Status: COMPLETED | OUTPATIENT
Start: 2022-09-02 | End: 2022-09-02

## 2022-09-02 RX ADMIN — LISINOPRIL 10 MG: 5 TABLET ORAL at 20:12

## 2022-09-02 RX ADMIN — BARIUM SULFATE: 400 SUSPENSION ORAL at 10:29

## 2022-09-02 RX ADMIN — HEPARIN SODIUM 5000 UNITS: 5000 INJECTION, SOLUTION INTRAVENOUS; SUBCUTANEOUS at 00:49

## 2022-09-02 RX ADMIN — Medication 81 MG: at 08:55

## 2022-09-02 RX ADMIN — HEPARIN SODIUM 5000 UNITS: 5000 INJECTION, SOLUTION INTRAVENOUS; SUBCUTANEOUS at 14:49

## 2022-09-02 RX ADMIN — ATORVASTATIN CALCIUM 80 MG: 40 TABLET, FILM COATED ORAL at 20:12

## 2022-09-02 RX ADMIN — CLOPIDOGREL BISULFATE 75 MG: 75 TABLET ORAL at 08:55

## 2022-09-02 ASSESSMENT — ACTIVITIES OF DAILY LIVING (ADL)
ADLS_ACUITY_SCORE: 34
ADLS_ACUITY_SCORE: 34
ADLS_ACUITY_SCORE: 30
ADLS_ACUITY_SCORE: 34
ADLS_ACUITY_SCORE: 36
ADLS_ACUITY_SCORE: 34
ADLS_ACUITY_SCORE: 32
ADLS_ACUITY_SCORE: 34
ADLS_ACUITY_SCORE: 36
ADLS_ACUITY_SCORE: 34
ADLS_ACUITY_SCORE: 30
ADLS_ACUITY_SCORE: 34

## 2022-09-02 NOTE — INTERIM SUMMARY
[facility] Pre-Admission Screen    Referral Source:  [facility] P1 -93  Admit date to referring facility: 8/30/2022    Physical Medicine and Rehab Consult Completed: No    Rehab Diagnosis:    Stroke Ischemic 01.1 (L) Body Involvement (R) Brain: small cluster of recent infarcts in the right frontal lobe and right basal ganglia    Justification for Acute Inpatient Rehabilitation  Stephani Garcia is a 67 year old female admitted for subacute strokes. The patient presented to the [facility] emergency room on 8.30.22 for evaluation of language impairment and left-sided weakness. The weakness had reportedly started the day prior to presentation, the word finding difficulty that morning upon waking. Patient has additional history of vertigo, hypertension and hyperlipidemia. CTA showed no large vessel occlusion or significant stenosis, 30% stenosis of right ICA. Repeat head CT for new facial drooping showed nothing acute. MRI brain revealed small cluster of recent infarcts in the right frontal lobe and right basal ganglia. She became a little weaker during hospitalization as well, due to likely evolution of her stroke. Also noted during hospitalization was a breast mass suspicious for cancer. This has grown quickly and has begun to bleed and is causing skin compromise. Needle biopsy was completed morning of 9/2 and assume this is breast cancer. She is currently a very poor candidate for surgery because of the size of the mass and because of her recent stroke. Surgery strongly recommends that she be set up to see oncology as an outpatient. It does not help for them to see her before the pathology is back, which should be back by about Wednesday of next week. She needs neoadjuvant chemotherapy or hormone therapy with the hope of shrinking this down before doing any surgery. This would also give us some time from her stroke before going ahead with surgery. Patient is  currently stable to transfer to inpatient acute rehab.     Patient requires an intensive inpatient rehab program to address the following acute impairments: impaired ROM, impaired L sided strength, impaired activity tolerance, impaired balance, impaired coordination, dysphagia, aphasia, and dysarthria    Current Active Medical Management Needs/Risks for Clinical Complications  The patient requires the high level of rehabilitation physician supervision that accompanies the provision of intensive rehabilitation therapy.  The patient needs the services of the rehabilitation physician to assess the patient medically and functionally and to modify the course of treatment as needed to maximize the patient's capacity to benefit from the rehabilitation process. Patient requires ongoing medical management and assessment of the following:     Neuro status - patient requires ongoing assessment for changes in neuro status with intervention as indicated as pt is at risk for further strokes    Blood pressure - patient will need ongoing assessment of blood pressure and management of medications with adjustment as indicated. Initially allowed blood pressure to run high first 24 hours but starting to bring it down slowly - started low dose lisinopril 8/31 - increased dose to 10mg 9/1.     Obstructive sleep apnea - patient states she does not use a Cpap so will just follow this for now. Patient is at increased risk for sleep disturbance post stroke as well so will need ongoing assessment.     Infection due to 2019 novel coronavirus - she has had very minimal cough and she is not hypoxic so she was started on 3 doses of prophylactic remdesivir (8/31-9/2) as she is high risk for progression. Will need ongoing assessment for changes in respiratory status.     Anticoagulation/antiplatelet - will need ongoing management; started on daily Plavix 75mg and aspirin 81mg. Aspirin monotherapy: 325mg daily after 90 days    Mental Health -  "patient is at increased risk for mood disturbance post stroke due to change in function which will be exacerbated by new likely breast cancer diagnosis. Patient will need ongoing assessment and would likely benefit from Health Psychology.     Breast Cancer - still awaiting pathology on this but based on needle biopsy, surgeon indicates that this is undoubtedly the diagnosis of the mass; noted to be bleeding and will need ongoing assessment for worsening. All oncology f/u and treatment will be completed following rehab course.     Patient is at risk for falls with injury, worsening COVID infection, further strokes, ongoing bleeding from breast needle biopsy, and mood/sleep disturbance.     Past Medical/Surgical History   Surgery in the past 100 days: No   Additional relevant past medical history: hyperlipidemia, hypertension, GILDA    Level of Functioning Prior to Admission:    LIVING ENVIRONMENT    People in Home: spouse (son is living with them for a few months)    Current Living Arrangements: house (2 level townhouse)    Home Accessibility: stairs to enter home; stairs within home      Number of Stairs, Main Entrance: other (see comments) (1.5 steps to enter)    Stair Railings, Main Entrance: railing on left side (ascending)    Number of Stairs, Within Home, Primary: greater than 10 stairs    Stair Railings, Within Home, Primary: railing on left side (ascending)   Living Environment Comments: Pt said she could stay on on level for ADLs but would rather not.    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    Level of Function: GG Scale (Section GG Functional Ability and Goals; CMS's ARDON Version 3.0 Manual effective 10.1.2019):  PT Current Function Goals for Rehab   Bed Rolling 1 Dependent (A of 2) 6 Independent   Supine " to Sit 1 Dependent (A of 2) 6 Independent   Sit to Stand 1 Dependent (A of 2) 6 Independent   Transfer 1 Dependent (lift) 4 Supervision or touching assitance   Ambulation Not completed 4 Supervision or touching assitance   Stairs Not completed 4 Supervision or touching assitance     OT Current Function Goals for Rehab   Feeding 5 Setup or clean-up assistance 6 Independent   Grooming 3 Partial/moderate assistance 6 Independent   Bathing Not completed 4 Supervision or touching assitance   Upper Body Dressing Not completed 6 Independent   Lower Body Dressing 1 Dependent 4 Supervision or touching assitance   Toileting 1 Dependent (A of 2) 6 Independent   Toilet Transfer 1 Dependent (A of 2) 4 Supervision or touching assitance   Tub/Shower Transfer Not completed 3 Partial/moderate assistance   Cognition Not Assessed Supervision     SLP Current Function Goals for Rehab   Swallow Impaired Tolerate least restrictive diet without signs & symptoms of aspiration and adhere to safe swallow strategies   Communication Impaired Communicate basic needs effectively       Current Diet:  3-Liquidized/moderately thick and 7-Regular/easy to chew    Summary Statement:  Patient is currently participating in PT/OT/SLP needs and is making progress. VFSS was completed 9/2 and pt demo silent aspiration of mildly thick liquids so placed on regular diet with moderately thick liquids. Demonstrates higher level word finding difficulty in conversation with difficulty expressing complex thoughts; Cognition needs further assessment for safety. Currently, pt performs supine<>sit with max A x 2 and sit scoot with mod A x 2 with assist for trunk alignment and assist with the task. She tends to lean to the L with all sitting and standing tasks. Pt completed STS with Max Ax2 using arm-in-arm technique, able to tolerate standing for ~1 min. [facility] at this time d/t impaired balance and weakness. Pt required Mod A for  balance following standing task d/t fatigue. Pt required Max Ax2 for bed mobility EOB>supine. Patient is dependent for donning socks and min A using washcloth while reclined in bed to complete facial hygiene.     Expected Therapies and Services Required During Inpatient Rehab Admission  Intensity of Therapy: Patient requires intensive therapies not available in a lesser level of care. Patient is motivated, making gains, and can tolerate 3 hours of therapy a day.  Physical Therapy: 60 minutes per day, 7 days a week for 21 days  Occupational Therapy: 60 minutes per day, 7 days a week for 21 days  Speech and Language Therapy: 60 minutes per day, 7 days a week for 21 days  Rehabilitation Nursing Needs: Patient requires 24 hour Rehab Nursing to manage vitals, medication education, positioning, carryover of new rehab techniques, care coordination, skin integrity, assess neurologic status, stroke education, wound care for breast mass and biopsy site (there really is no good option for the bleeding other than just keeping it covered with a nonadhesive type dressing such as an Adaptic and then gauze), and provide safe environment for patient at falls risk.    Precautions/restrictions/special needs:   Precautions: fall precautions and isolation precautions (COVID - special precautions)   Restrictions: none   Special Needs: lift and isolation    Expected Level of Improvement: Mod I with wc mobility and CGA with transfers and ADLs; anticipate pt will need assist with all IADLs  Expected Length of time to achieve: 21 days    Anticipated Discharge Needs:  Anticipated Discharge Destination: Home  Anticipated Discharge Support: Family member  24/7 support available : Yes  Identified caregiver(s):  Spouse and son (living with pt for a few months)  Anticipated Discharge Needs: Home with homecare    Identified challenges/barriers:  Significant therapy needs    Liaison Signature/date/time:      Physician statement of review and  agreement:  I have reviewed and am in agreement of the need for IRF stay to address above functional and medical needs. In addition to above statements address, Patient requires intensive active and ongoing therapeutic intervention and multiple therapies; Patient requires medical supervision; Expected to actively participate in the intensive rehab program; Sufficiently stable to actively participate; Expectation for measurable improvement in functional capacity or adaption to impairments.    MD signature/date/time:

## 2022-09-02 NOTE — CONSULTS
Care Management Follow Up    Length of Stay (days): 3    Expected Discharge Date: 09/09/2022     Concerns to be Addressed: Discharge planning   Patient plan of care discussed at interdisciplinary rounds: Yes    Anticipated Discharge Disposition:  [facility]     Anticipated Discharge Services:  [facility]  Anticipated Discharge DME:  None    Patient/family educated on Medicare website which has current facility and service quality ratings:  Yes, referral to Acute Rehab was more appropriate.   Education Provided on the Discharge Plan:  Yes  Patient/Family in Agreement with the Plan:  Yes    Referrals Placed by CM/SW:  [facility]  Private pay costs discussed: Not applicable    Additional Information:  SW talked with [facility] and they are willing to accept patient COVID+ and address any outpatient needs. SW relayed this information to patient and she is in agreement. SW let patient know that SW can help set up transportation.    11:28 AM  SW updated patient's son on [facility] referral. SW set up [facility] stretcher transport for tomorrow 9/3/22 at 11am.       Ashlie Raymond

## 2022-09-02 NOTE — PROGRESS NOTES
Rehab Admissions:  Spoke with patient over the phone this afternoon to educate on [facility] and answer any questions. Provided education on location, ELOS, therapy expectations, visitor policy as patient is in special precautions, items to bring, and physician and nursing coverage. Patient is aware of the plan for admission at 11am tomorrow.     Thank you for the referral, we will continue to follow this patient for post acute placement. Please call if any questions.     Determination of admission is based upon the patient's need for an intensive, interdisciplinary approach to rehabilitation, their ability to progress, their ability to tolerate intensive therapies, their need for daily physician supervision, their need for twenty four hour nursing assistance, and their ability and willingness to participate in such a program.    Mechelle Baugh MS, OTR/L  Rehab Liaison/ Supervisor  [facility]  9/2/2022    3:12 PM

## 2022-09-02 NOTE — PROGRESS NOTES
Speech-Language Pathology: Video Swallow Study     09/02/22 1000   General Information   Onset of Illness/Injury or Date of Surgery 08/30/22   Referring Physician Dr. Hamilton   Pertinent History of Current Problem Inconsistent, weak s/s aspiration at bedside, VFSS to further assess;Stephani Garcia is a 66 yo F with h/o dyslipidemia, HTN, and GILDA who presents with left-sided weakness in her left arm and left leg started yesterday afternoon.  She was able to walk but she felt like she had to hang onto things to stay steady.  She slept okay last night and this morning she woke up and still had the weakness and noticed increasing speech difficulty.  She had had some speech difficulty yesterday but very mild and intermittent.  She is having trouble word finding and a little bit of slurring of speech.  She denies any headaches or visual changes no scotoma or diplopia.  No hearing changes.  No loss of bowel or bladder control.  She never had a thing like this before.  She cannot think of anything that would have triggered this.  She has had a slight cough and is feeling a little rundown just recently but not very long.  No fevers or chills or nausea or vomiting or diarrhea or melena or hematochezia.   General Observations Alert and cooperative   Type of Evaluation   Type of Evaluation Swallow Evaluation   Oral Motor   Oral Musculature anomalies present   Dentition (Oral Motor)   Dentition (Oral Motor) adequate dentition   Facial Symmetry (Oral Motor)   Facial Symmetry (Oral Motor) left side impairment   Comment, Facial Symmetry (Oral Motor) most notable at rest   Left Side Facial Asymmetry minimal impairment   Lip Function (Oral Motor)   Lip Range of Motion (Oral Motor) WNL   Comment, Lip Function (Oral Motor) asymmetry at rest, WFL with movement   Tongue Function (Oral Motor)   Tongue Strength (Oral Motor) WFL   Tongue ROM (Oral Motor) protrusion is impaired   Protrusion, Tongue ROM Impairment (Oral Motor) left  side;minimal impairment   General Swallowing Observations   Swallowing Evaluation Videofluoroscopic swallow study (VFSS)   Clinical Swallow Evaluation   Feeding Assistance minimal assistance required   VFSS Evaluation   Radiologist Dr. Sagastume   Views Taken left lateral   Physical Location of Procedure [facility]   VFSS Textures Trialed mildly thick liquids;moderately thick liquids/liquidized;pureed;solid foods   VFSS Eval: Mildly Thick Liquids   Mode of Presentation spoon   Order of Presentation 1   Preparatory Phase WFL   Oral Phase WFL;Premature pharyngeal entry   Pharyngeal Phase Delayed swallow reflex   Rosenbek's Penetration Aspiration Scale 8 - contrast passes glottis, visible subglottic residue remains, absent patient response (aspiration)   VFSS Eval: Moderately Thick Liquids   Mode of Presentation spoon   Preparatory Phase WFL   Oral Phase WFL   Pharyngeal Phase WFL;Delayed swallow reflex   Rosenbek's Penetration Aspiration Scale 1 - no aspiration, contrast does not enter airway   VFSS Evaluation: Puree Solid Texture Trial   Mode of Presentation, Puree spoon   Preparatory Phase WFL   Oral Phase, Puree WFL   Pharyngeal Phase, Puree WFL   Rosenbek's Penetration Aspiration Scale: Puree Food Trial Results 1 - no aspiration, contrast does not enter airway   VFSS Evaluation: Solid Food Texture Trial   Mode of Presentation, Solid fed by clinician   Preparatory Phase WFL   Oral Phase, Solid WFL   Pharyngeal Phase, Solid WFL   Rosenbek's Penetration Aspiration Scale: Solid Food Trial Results 1 - no aspiration, contrast does not enter airway   Swallowing Recommendations   Diet Consistency Recommendations moderately thick liquids/liquidized (level 3);regular diet   Supervision Level for Intake distant supervision needed   Mode of Delivery Recommendations bolus size, small;slow rate of intake   Monitoring/Assistance Required (Eating/Swallowing) monitor for cough or change in vocal quality with intake    Recommended Feeding/Eating Techniques (Swallow Eval) set-up and prepare tray;maintain upright posture during/after eating for 30 minutes;minimize distractions during oral intake   Medication Administration Recommendations, Swallowing (SLP) per patient preference-puree or moderately thick   Instrumental Assessment Recommendations VFSS (videofluoroscopic swallowing study)  (repeat in 1-2 weeks or as improvement noted at bedside)   Comment, Swallowing Recommendations Videofluoroscopic Swallow Study completed. Patient had direct, silent aspiration with mildly thick liquid on first trial and deep penetration with mildly thick liquids on subsequent trials. No aspiration or penetration with moderately thick liquids. Oral motor function was WFL overall with mild left asymmetry and oral phase was minimally impaired with premature spillage of mildly thick liquids. Tongue base retraction was complete. Swallow response was mild to moderately delayed with mildly thick liquids and mildly delayed with puree and moderately thick. Epiglottic inversion was complete consistently.  No significant stasis occurred with any intake. Hyolaryngeal elevation was inconsistent in movement, mildly delayed but largely intact and hyolaryngeal excursion was inconsistent with range from nearly absent to complete. Recommend diet of Regular textures and Moderately thick liquids. Repeat VFSS in 1-2 weeks or as improvement noted at bedside.   Clinical Impression   Criteria for Skilled Therapeutic Interventions Met (SLP Eval) Yes, treatment indicated   SLP Diagnosis dysphagia   Clinical Impression Comments Silent aspiration with mildly thick liquids. Repeat VFSS in 1-2 weeks.   SLP Discharge Planning   SLP Discharge Recommendation home with assist;Acute [facility]hab [facility]-Motivated patient will benefit from intensive, interdisciplinary therapy.  Anticipate will be able to tolerate 3 hours of therapy per day   SLP Rationale for DC Rec Demonstrates higher  level word finding difficulty in conversation with difficulty expressing complex thoughts, cognition needs further assessment for safety, also demonstrating additional more concerning s/s aspiration with PO intake, video swallow study pending. Would benefit from intensive  ST intervention for safe swallow and PO intake, independent communication and participation in her care, and problem solving/executive function for safety.   SLP Brief overview of current status  Regular diet with Moderately thick liquids. VFSS showed silent aspiration of mildly thick liquids. Repeat VFSS when appropriate.    Total Evaluation Time   Total Evaluation Time (Minutes) 10   Psychosocial Support   Trust Relationship/Rapport care explained

## 2022-09-02 NOTE — PLAN OF CARE
Problem: Bowel Elimination Impaired (Stroke, Ischemic/Transient Ischemic Attack)  Goal: Effective Bowel Elimination  Outcome: Ongoing, Not Progressing     Problem: Plan of Care - These are the overarching goals to be used throughout the patient stay.    Goal: Optimal Comfort and Wellbeing  Outcome: Ongoing, Progressing     Problem: Cognitive Impairment (Stroke, Ischemic/Transient Ischemic Attack)  Goal: Optimal Cognitive Function  Outcome: Ongoing, Progressing     Problem: Communication Impairment (Stroke, Ischemic/Transient Ischemic Attack)  Goal: Improved Communication Skills  Outcome: Ongoing, Progressing   Goal Outcome Evaluation:      Patient denied pain. Scored 10 and 9 on NIH. Sinus tachycardia on telemetry. Very poor appetite noted. Incontinent of urine; purewick in place. Patient being checked and changed and helped with repositioning.

## 2022-09-02 NOTE — PROGRESS NOTES
Surgery:  Procedure: After verbal consent, the area lateral to the mass was prepped with alcohol.  Using a 21-gauge needle I took multiple passes and placed what I obtained in CytoLyt.  Pressure was held.  A new bandage was put over the area.    Impression: Undoubtedly this is a breast cancer.  Her story varies as far as how long its been there.  The fine-needle aspirate should establish estrogen and progesterone receptors as well as HER2 which could change her treatment.  She is currently a very poor candidate for surgery because of the size of the mass and because of her recent stroke.    I strongly recommend that she be set up to see oncology as an outpatient.  It does not help for them to see her before the pathology is back.  It should be back by about Wednesday of next week.  She needs neoadjuvant chemotherapy or hormone therapy with the hope of shrinking this down before doing any surgery.  This would also give us some time from her stroke before going ahead with surgery.    As far as the bleeding, there really is no good option other than just keeping it covered with a nonadhesive type dressing such as an Adaptic and then gauze.  Putting it on with some pressure may help decrease the bleeding.  Hopefully once she is treated with either chemotherapy or hormone therapy, this will shrink down and the bleeding will stop.    She will follow-up with me after she has completed a course of chemotherapy and/or neoadjuvant hormone therapy.

## 2022-09-02 NOTE — PLAN OF CARE
Problem: Plan of Care - These are the overarching goals to be used throughout the patient stay.    Goal: Plan of Care Review/Shift Note  Description: The Plan of Care Review/Shift note should be completed every shift.  The Outcome Evaluation is a brief statement about your assessment that the patient is improving, declining, or no change.  This information will be displayed automatically on your shift note.  Outcome: Ongoing, Progressing     Problem: Adjustment to Illness (Stroke, Ischemic/Transient Ischemic Attack)  Goal: Optimal Coping  Outcome: Ongoing, Progressing     Problem: Cognitive Impairment (Stroke, Ischemic/Transient Ischemic Attack)  Goal: Optimal Cognitive Function  Outcome: Ongoing, Progressing     Problem: Swallowing Impairment (Stroke, Ischemic/Transient Ischemic Attack)  Goal: Optimal Eating and Swallowing without Aspiration  Outcome: Ongoing, Progressing   Goal Outcome Evaluation:    Patient is alert and oriented x3, she denied of pain. She has left sided weakness, NIH at 10, neuro- see flow sheet. Tele shows NSR. Video study was done, pt shows aspiration with nector thick. So fluid intake changed to honey thick per speech therapist. IV is patent and dressing is intact. Buttock is red. Encouraged pt to reposition. Explained meds and care plan to patient.

## 2022-09-02 NOTE — PROGRESS NOTES
[facility]    Medicine Progress Note - Hospitalist Service    Date of Admission:  8/30/2022    Assessment & Plan           68 yo F with h/o dyslipidemia, HTN, breast mass, and GILDA who presented to ED 8/30/22 with left-sided partial hemiparesis and mild dysarthria/expressive aphasia and was found to have an acute ischemic stroke on MRI.  No intervention with either TPA or thrombectomy due to time of onset of symptoms and there were no large vessel occlusions seen on CTA.  Echo normal. She was started on Plavix and aspirin and statin.  No Afib on tele.  Continue PT/OT/speech.  Continue to slowly bring BP down. Left breast mass being evaluated by surgery but they feel it is unlikely she will be a surgical candidate at this time.     Principal Problem:    Acute ischemic stroke -as above, she is weaker again on the left side with more pronounced left facial droop.  Speech seems a little bit better but still with word-finding difficulty.  Continue ASA, plavix, statin as per neurology.      Active Problems:    Left Breast mass - very likely breast CA.  FNA completed by surgery 9/2.  Referral to oncology outpatient.  Surgery rec'd neoadjuvent treatment with hopes of shrinking tumor prior to surgery.      Hyperlipidemia -statin high-dose    Hypertension - initially allowed blood pressure to run high first 24 hours but starting to bring it down slowly - started low dose lisinopril 8/31 - will increase dose.    GILDA (obstructive sleep apnea) -she states she does not use CPAP so we will just follow this for now    Infection due to 2019 novel coronavirus - she has had very minimal cough without hypoxia.  Completed remdesivir x 3 days.       anticipated discharge to acute rehab tomorrow.    Diet: Room Service  Regular Diet Adult Liquidized/Moderately Thick (level 3)    DVT Prophylaxis: heparin subcut  Farooq Catheter: Not present  Central Lines: None  Cardiac Monitoring: ACTIVE order. Indication: Stroke,  acute (48 hours)  Code Status: Full Code      Disposition Plan     Expected Discharge Date: 09/03/2022      Herrera Huggins MD  Hospitalist Service  [facility]  Securely message with the Vocera Web Console (learn more here)  Text page via [employer] Paging/Directory         Clinically Significant Risk Factors Present on Admission                      ______________________________________________________________________    Interval History     No major change in L sided weakness.  Had FNA. Discussed acute rehab tomorrow.      Physical Exam   Vital Signs: Temp: 99.2  F (37.3  C) Temp src: Oral BP: (!) 159/78 Pulse: 97   Resp: 22 SpO2: 95 % O2 Device: None (Room air)    Weight: 0 lbs 0 oz  General Appearance: Alert, interactive, no distress  Respiratory: easy respiration  Cardiovascular: NSR on tele  Skin: left breast mass is purplish with a couple of open areas that are oozing blood  Neuro: L sided weakness, some word finding difficulty    Data   Recent Labs   Lab 09/02/22  0846 09/02/22  0739 09/01/22  0852 08/31/22  1140 08/31/22  0557 08/31/22  0059 08/30/22  1438   WBC  --   --   --   --  5.6  --  8.7   HGB  --  15.1  --   --  15.0  --  15.6   MCV  --   --   --   --  88  --  85   PLT  --  256  --   --  227  --  276   INR  --   --   --   --   --   --  1.00   NA  --  137  --   --  140  --  137   POTASSIUM  --  3.4*  --   --  3.3*  --  3.5   CHLORIDE  --  105  --   --  104  --  103   CO2  --  21*  --   --  25  --  23   BUN  --  17  --   --  11  --  13   CR  --  0.77  --   --  0.94  --  1.01   ANIONGAP  --  11  --   --  11  --  11   ERNIE  --  9.0  --   --  9.0  --  9.7   * 90 99   < > 97   < > 118   ALBUMIN  --   --   --   --   --   --  4.5   PROTTOTAL  --   --   --   --   --   --  7.8   BILITOTAL  --   --   --   --   --   --  0.5   ALKPHOS  --   --   --   --   --   --  109   ALT  --   --   --   --   --   --  24   AST  --   --   --   --   --   --  44*    < > = values in this interval not  displayed.     Medications     - MEDICATION INSTRUCTIONS -       - MEDICATION INSTRUCTIONS -         aspirin  81 mg Oral Daily     atorvastatin  80 mg Oral QPM     clopidogrel  75 mg Oral Daily     heparin ANTICOAGULANT  5,000 Units Subcutaneous Q12H     labetalol  10 mg Intravenous Once     lisinopril  10 mg Oral At Bedtime

## 2022-09-02 NOTE — PLAN OF CARE
Problem: Plan of Care - These are the overarching goals to be used throughout the patient stay.    Goal: Optimal Comfort and Wellbeing  Outcome: Met     Problem: Adjustment to Illness (Stroke, Ischemic/Transient Ischemic Attack)  Goal: Optimal Coping  Outcome: Met     Problem: Communication Impairment (Stroke, Ischemic/Transient Ischemic Attack)  Goal: Improved Communication Skills  Outcome: Met   Goal Outcome Evaluation:        Pt. Alert and oriented. Left side hemiplegia. Left side facial droop. Slurred speech. Numbness/tingling left arm and leg. Continues on telemetry NSR. Denies pain. Continues with a cough. On isolation for Covid. Will continue to monitor.    Kenisha Talley RN

## 2022-09-02 NOTE — PROGRESS NOTES
NEUROLOGY PROGRESS NOTE     ASSESSMENT & PLAN       Diagnosis: Subacute strokes     67 year old female admitted for subacute strokes.  The patient presented to the [facility] emergency room on 8.30.22 for evaluation of language impairment and left-sided weakness.  The weakness had reportedly started the day prior to presentation, the word finding difficulty that morning upon waking.  Patient has additional history of vertigo, hypertension and hyperlipidemia.         CT head negative for acute pathology.      CTA shows no large vessel occlusion or significant stenosis, 30% stenosis of right ICA.    Repeat head CT for new facial drooping showed nothing acute.     MRI brain revealed small cluster of recent infarcts in the right frontal lobe and right basal ganglia.    No thrombolysis due to timeframe of symptoms.    Echocardiogram unremarkable.    Patient is COVID-positive, on remdesivir.    UA abnormal.  Management per primary team.    Loaded with Plavix, started on daily Plavix 75mg and aspirin 81mg.  Aspirin monotherapy: 325mg daily after 90 days.    , high-dose Lipitor initiated; Continue.    Hemoglobin A1c 5.7%, management per primary team for euglycemia.      Telemetry, neurochecks.  Goal BP upon discharge <140/90.    Untreated GILDA - Patient denies symptoms.  May need reevaluation as outpatient.    PT/OT/SLP.    Stroke education.    Breast mass.  Plan for follow-up per Surgery recs.  Outpatient oncology.    Neurology will sign off.  Follow-up in neurology clinic in 6-8 weeks.      Neurology Discharge Planning: PT recommends [facility].       Patient Active Problem List    Diagnosis Date Noted     Acute ischemic stroke (H) 08/30/2022     Priority: Medium     Infection due to 2019 novel coronavirus 08/30/2022     Priority: Medium     GILDA (obstructive sleep apnea) 09/07/2011     Priority: Medium     Formatting of this note might be different from the original.  Split-Night Polysomnogram Interpretation    Date of  "read:  9/7/2011   Estimated Body mass index is 26.94 kg/(m^2) as calculated from the following:    Height as of 8/19/11: 5' 7\"(1.702 m).    Weight as of 8/19/11: 172 lb(78.019 kg).  Total Quincy Score: (not recorded)  Neck Circumference (in inches): 13     Baseline:  This study was performed in order to evaluate for GILDA.  Her snoring has become worse recently. Her sleep is non-restorative.   Hypopnea Definition: Rule VIII.4.A  Due to the presence of respiratory events, this study was done in two parts (baseline, followed by titration).  The total sleep time was: 141.6  minutes.  The sleep latency was: 0  minutes, which is decreased.  The REM sleep latency was: 112.4  minutes.  Sleep efficiency was: 96.9  % which is normal.  The sleep architecture was disrupted with frequent sleep stage changes and arousals.  Snoring reported as: moderately loud;continuous.  Respiratory Events:  Obstructive Apneas: 2 ,  Central Apneas: 0 , Mixed Apneas: 0 , Hypopneas: 98 , R; GILDA (obstructive sleep apnea)-Severe (AHI 42)       Hyperlipidemia 12/07/2009     Priority: Medium     Hypertension 12/07/2009     Priority: Medium     Medical History  Past Medical History:   Diagnosis Date     Hyperlipidemia 12/7/2009     Hypertension 12/7/2009     GILDA (obstructive sleep apnea) 9/7/2011    Formatting of this note might be different from the original. Split-Night Polysomnogram Interpretation  Date of read:  9/7/2011  Estimated Body mass index is 26.94 kg/(m^2) as calculated from the following:   Height as of 8/19/11: 5' 7\"(1.702 m).   Weight as of 8/19/11: 172 lb(78.019 kg). Total Quincy Score: (not recorded) Neck Circumference (in inches): 13   Baseline: This study was performed in        SUBJECTIVE     No acute events overnight.  Fine-needle aspiration of breast mass this morning.  Concern for malignancy.  Swallow study pending for today.    Stephani is working with PT when I arrived.  This therapist has noticed that a couple of days ago she " had a little bit more distal movement of the left leg and now she is a little stronger proximally.  Stephani tells me she will be going to rehabilitation tomorrow.     OBJECTIVE     Vital signs in last 24 hours  Temp:  [98.5  F (36.9  C)-99.6  F (37.6  C)] 99.2  F (37.3  C)  Pulse:  [] 97  Resp:  [20-22] 22  BP: (138-171)/(67-88) 159/78  SpO2:  [94 %-96 %] 95 %    Weight:   [unfilled]    Review of Systems   Pertinent items are noted in HPI.    General Physical Exam: Patient is alert and oriented x 3. Vital signs were reviewed and are documented in EMR. Neck was supple.  No thyromegaly, JVD or lymphadenopathy noted.  Neurological Exam:  Mental status: Attentive, interactive.  Speech: Some word finding difficulties, dysarthria.  Cranial nerves: 2-12 intact other than palate, not visualized and some facial asymmetry.  I also noticed some exotropia on the right this exam, which Stephani says is from childhood.  Motor: 3-/5 in the left upper extremity, 4-/5 in the left lower extremity proximally, little movement distally.  Strength appears full on the right.  Sensory: Intact light touch throughout, though patient reports sensation is lighter on the left side.  Some neglect on the left.  Coordination: Cannot test on the left.  No dysmetria with right upper extremity fine motor movements.  Gait: Deferred for safety.         DIAGNOSTIC STUDIES     Pertinent Radiology   Radiology Results: Personally reviewed image/s    Pertinent Labs   Lab Results: personally reviewed.   Recent Results (from the past 24 hour(s))   Platelet count    Collection Time: 09/02/22  7:39 AM   Result Value Ref Range    Platelet Count 256 150 - 450 10e3/uL   Basic metabolic panel    Collection Time: 09/02/22  7:39 AM   Result Value Ref Range    Sodium 137 136 - 145 mmol/L    Potassium 3.4 (L) 3.5 - 5.0 mmol/L    Chloride 105 98 - 107 mmol/L    Carbon Dioxide (CO2) 21 (L) 22 - 31 mmol/L    Anion Gap 11 5 - 18 mmol/L    Urea Nitrogen 17 8 - 22 mg/dL     Creatinine 0.77 0.60 - 1.10 mg/dL    Calcium 9.0 8.5 - 10.5 mg/dL    Glucose 90 70 - 125 mg/dL    GFR Estimate 84 >60 mL/min/1.73m2   Hemoglobin    Collection Time: 09/02/22  7:39 AM   Result Value Ref Range    Hemoglobin 15.1 11.7 - 15.7 g/dL   Glucose by meter    Collection Time: 09/02/22  8:46 AM   Result Value Ref Range    GLUCOSE BY METER POCT 100 (H) 70 - 99 mg/dL         HOSPITAL MEDICATIONS       aspirin  81 mg Oral Daily     atorvastatin  80 mg Oral QPM     clopidogrel  75 mg Oral Daily     heparin ANTICOAGULANT  5,000 Units Subcutaneous Q12H     labetalol  10 mg Intravenous Once     lisinopril  10 mg Oral At Bedtime        Total time spent for face to face visit, reviewing labs/imaging studies, counseling and coordination of care was: 30 Minutes. More than 50% of this time was spent on counseling and coordination of care.    Dragon software used in the dictation on this note.    Liza Alexander MD  [facility] - [address], [address]  [place] [address]

## 2022-09-03 ENCOUNTER — APPOINTMENT (OUTPATIENT)
Dept: SPEECH THERAPY | Facility: HOSPITAL | Age: 67
DRG: 064 | End: 2022-09-03
Payer: COMMERCIAL

## 2022-09-03 ENCOUNTER — APPOINTMENT (OUTPATIENT)
Dept: PHYSICAL THERAPY | Facility: HOSPITAL | Age: 67
DRG: 064 | End: 2022-09-03
Payer: COMMERCIAL

## 2022-09-03 PROCEDURE — 97110 THERAPEUTIC EXERCISES: CPT | Mod: GP

## 2022-09-03 PROCEDURE — 250N000011 HC RX IP 250 OP 636: Performed by: INTERNAL MEDICINE

## 2022-09-03 PROCEDURE — 97530 THERAPEUTIC ACTIVITIES: CPT | Mod: GP

## 2022-09-03 PROCEDURE — 250N000013 HC RX MED GY IP 250 OP 250 PS 637: Performed by: INTERNAL MEDICINE

## 2022-09-03 PROCEDURE — 120N000001 HC R&B MED SURG/OB

## 2022-09-03 PROCEDURE — 99232 SBSQ HOSP IP/OBS MODERATE 35: CPT | Performed by: HOSPITALIST

## 2022-09-03 RX ORDER — LISINOPRIL 10 MG/1
10 TABLET ORAL AT BEDTIME
Start: 2022-09-03 | End: 2022-09-05

## 2022-09-03 RX ORDER — CLOPIDOGREL BISULFATE 75 MG/1
75 TABLET ORAL DAILY
Status: ON HOLD
Start: 2022-09-03 | End: 2022-09-29

## 2022-09-03 RX ORDER — ATORVASTATIN CALCIUM 80 MG/1
80 TABLET, FILM COATED ORAL EVERY EVENING
Status: ON HOLD
Start: 2022-09-03 | End: 2022-09-29

## 2022-09-03 RX ADMIN — LISINOPRIL 10 MG: 5 TABLET ORAL at 20:43

## 2022-09-03 RX ADMIN — HEPARIN SODIUM 5000 UNITS: 5000 INJECTION, SOLUTION INTRAVENOUS; SUBCUTANEOUS at 00:09

## 2022-09-03 RX ADMIN — HEPARIN SODIUM 5000 UNITS: 5000 INJECTION, SOLUTION INTRAVENOUS; SUBCUTANEOUS at 13:10

## 2022-09-03 RX ADMIN — CLOPIDOGREL BISULFATE 75 MG: 75 TABLET ORAL at 08:32

## 2022-09-03 RX ADMIN — ATORVASTATIN CALCIUM 80 MG: 40 TABLET, FILM COATED ORAL at 20:44

## 2022-09-03 RX ADMIN — Medication 81 MG: at 08:32

## 2022-09-03 ASSESSMENT — ACTIVITIES OF DAILY LIVING (ADL)
ADLS_ACUITY_SCORE: 31
ADLS_ACUITY_SCORE: 35
ADLS_ACUITY_SCORE: 35
ADLS_ACUITY_SCORE: 31
ADLS_ACUITY_SCORE: 35
ADLS_ACUITY_SCORE: 31
ADLS_ACUITY_SCORE: 33
ADLS_ACUITY_SCORE: 31
ADLS_ACUITY_SCORE: 31
ADLS_ACUITY_SCORE: 35
ADLS_ACUITY_SCORE: 31
ADLS_ACUITY_SCORE: 35

## 2022-09-03 NOTE — PLAN OF CARE
"PRIMARY DIAGNOSIS: \"GENERIC\" NURSING  OUTPATIENT/OBSERVATION GOALS TO BE MET BEFORE DISCHARGE:  ADLs back to baseline: No    Activity and level of assistance: Up with maximum assistance. Consider SW and/or PT evaluation.     Pain status: Pain free.    Return to near baseline physical activity: No     Discharge Planner Nurse   Safe discharge environment identified: Yes  Barriers to discharge: Yes       Entered by: Leighton Ortiz RN 09/03/2022 1:33 PM     Please review provider order for any additional goals.   Nurse to notify provider when observation goals have been met and patient is ready for discharge.Goal Outcome Evaluation:                      "

## 2022-09-03 NOTE — PLAN OF CARE
"PRIMARY DIAGNOSIS: \"GENERIC\" NURSING  OUTPATIENT/OBSERVATION GOALS TO BE MET BEFORE DISCHARGE:  ADLs back to baseline: No    Activity and level of assistance: Up with maximum assistance. Consider SW and/or PT evaluation.     Pain status: Pain free.    Return to near baseline physical activity: No     Discharge Planner Nurse   Safe discharge environment identified: Yes  Barriers to discharge: Yes       Entered by: Leighton Ortiz RN 09/03/2022 8:42 AM     Please review provider order for any additional goals.   Nurse to notify provider when observation goals have been met and patient is ready for discharge.Goal Outcome Evaluation:                      "

## 2022-09-03 NOTE — PLAN OF CARE
"Speech Language Therapy Discharge Summary    Reason for therapy discharge:    Discharged to acute rehabilitation facility.    Progress towards therapy goal(s). See goals on Care Plan in Epic electronic health record for goal details.  Goals partially met.  Barriers to achieving goals:   discharge from facility.    Therapy recommendation(s):    Continued therapy is recommended.  Rationale/Recommendations:  Video swallow study on 9/2/22: \"Patient had direct, silent aspiration with mildly thick liquid on first trial and deep penetration with mildly thick liquids on subsequent trials. No aspiration or penetration with moderately thick liquids. Oral motor function was WFL overall with mild left asymmetry and oral phase was minimally impaired with premature spillage of mildly thick liquids. Tongue base retraction was complete. Swallow response was mild to moderately delayed with mildly thick liquids and mildly delayed with puree and moderately thick. Epiglottic inversion was complete consistently.  No significant stasis occurred with any intake. Hyolaryngeal elevation was inconsistent in movement, mildly delayed but largely intact and hyolaryngeal excursion was inconsistent with range from nearly absent to complete. Recommend diet of Regular textures and Moderately thick liquids. Repeat VFSS in 1-2 weeks or as improvement noted at bedside..\" Recommend: diet f/u with moderately thick; bolus control ex's to reduce aspiration risk; CLQT/cog assessment and higher level verbal expression.                          "

## 2022-09-03 NOTE — PLAN OF CARE
Occupational Therapy Discharge Summary    Reason for therapy discharge:    Discharged to acute rehabilitation facility.    Progress towards therapy goal(s). See goals on Care Plan in [facility] electronic health record for goal details.  Goals not met.  Barriers to achieving goals:   discharge from facility.    Therapy recommendation(s):    Continued therapy is recommended.  Rationale/Recommendations:  Pt to go to Acute Rehab for further therapy..

## 2022-09-03 NOTE — DISCHARGE SUMMARY
[facility] MEDICINE  DISCHARGE SUMMARY     Primary Care Physician: System, Provider Not In  Admission Date: 8/30/2022   Discharge Provider: Herrera Huggins MD Discharge Date: 9/3/2022   Diet:   Active Diet and Nourishment Order   Procedures     Room Service     Regular Diet Adult Liquidized/Moderately Thick (level 3)     Advance Diet as Tolerated       Code Status: Full Code   Activity: DCACTIVITY: Activity as tolerated        Condition at Discharge: Stable     REASON FOR PRESENTATION(See Admission Note for Details)     L sided weakness    PRINCIPAL & ACTIVE DISCHARGE DIAGNOSES     Principal Problem:    Acute ischemic stroke (H)  Active Problems:    Hyperlipidemia    Hypertension    GILDA (obstructive sleep apnea)    Infection due to 2019 novel coronavirus      PENDING LABS     Unresulted Labs Ordered in the Past 30 Days of this Admission     Date and Time Order Name Status Description    9/2/2022  8:03 AM Cytology, non-gynecologic In process             PROCEDURES ( this hospitalization only)          RECOMMENDATIONS TO OUTPATIENT PROVIDER FOR F/U VISIT     1. Follow-up with oncology for breast mass evaluation      DISPOSITION     Acute rehab    SUMMARY OF HOSPITAL COURSE:      67F with dyslipidemia, HTN, breast mass, and GILDA who presented to ED 8/30/22 with left-sided partial hemiparesis and mild dysarthria/expressive aphasia and was found to have an acute ischemic stroke on MRI.  No intervention with either TPA or thrombectomy due to time of onset of symptoms and there were no large vessel occlusions seen on CTA.  Echo normal. She was started on Plavix and aspirin and statin.  No Afib on tele.  Discharged to Acute rehab for ongoing care.    Noted to have a large left breast mass which was evaluated by surgery and underwent FNA.  Will need oncology follow-up in the near future.  A referral has been placed.       #Acute ischemic stroke -   -asa, plavix, statin    #dysphagia -  moderately thick fluids     #Left Breast mass - very likely breast CA.  FNA completed by surgery 9/2.  Referral to oncology outpatient.  Surgery rec'd neoadjuvent treatment with hopes of shrinking tumor prior to surgery.     #Hyperlipidemia -statin high-dose  #Hypertension - initially allowed blood pressure to run high first 24 hours but starting to bring it down slowly - started low dose lisinopril 8/31.    #GILDA (obstructive sleep apnea) - doesn't use CPAP.   #Infection due to 2019 novel coronavirus - she has had very minimal cough without hypoxia.  Completed remdesivir x 3 days.          Discharge Medications with Med changes:     Current Discharge Medication List      START taking these medications    Details   aspirin (ASA) 81 MG EC tablet Take 1 tablet (81 mg) by mouth daily    Associated Diagnoses: Stroke-like symptom      atorvastatin (LIPITOR) 80 MG tablet Take 1 tablet (80 mg) by mouth every evening    Associated Diagnoses: Stroke-like symptom      clopidogrel (PLAVIX) 75 MG tablet Take 1 tablet (75 mg) by mouth daily    Associated Diagnoses: Stroke-like symptom      lisinopril (ZESTRIL) 10 MG tablet Take 1 tablet (10 mg) by mouth At Bedtime    Associated Diagnoses: Stroke-like symptom                   Rationale for medication changes:              Consults     NEUROLOGY IP STROKE CONSULT  SPEECH LANGUAGE PATH ADULT IP CONSULT  PHARMACY IP CONSULT  PHARMACY IP CONSULT  PHARMACY IP CONSULT  PHYSICAL THERAPY ADULT IP CONSULT  OCCUPATIONAL THERAPY ADULT IP CONSULT  REHAB ADMISSIONS LIAISON IP CONSULT  CARE MANAGEMENT / SOCIAL WORK IP CONSULT  SURGERY GENERAL IP CONSULT  HEMATOLOGY & ONCOLOGY IP CONSULT  SMOKING CESSATION PROGRAM IP CONSULT    Immunizations given this encounter       There is no immunization history on file for this patient.        Anticoagulation Information            SIGNIFICANT IMAGING FINDINGS     Results for orders placed or performed during the hospital encounter of 08/30/22   CTA Head  Neck with Contrast    Impression    IMPRESSION:    HEAD CT:  1. No acute intracranial abnormality.    HEAD CTA:  1. Variant anatomy, otherwise unremarkable intracranial vasculature.    NECK CTA:  1. 30% stenosis of the right ICA origin.    - - - - - - - - - - - - - - - - - - - - - - - - - - - - - - - - - - - - - - - - - - - - - - - - - - - - - - - - - - - - - - - - - - - - - - - - - - - -   The results above were discussed with Dr. Beltre on 8/30/2022 2:39 PM by Dr. Jabari Sams.  - - - - - - - - - - - - - - - - - - - - - - - - - - - - - - - - - - - - - - - - - - - - - - - - - - - - - - - - - - - - - - - - - - - - - - - - - - - -    XR Chest Port 1 View    Impression    IMPRESSION: Lungs are clear. Heart and pulmonary vascularity are normal. No signs of acute disease.   MR Brain w/o & w Contrast    Impression    IMPRESSION:  1.  Cluster of small recent ischemic infarcts involving the deep white matter of the right frontal lobe and right basal ganglia.  2.  No other recent infarcts.  3.  Age-related changes.   CT Head w/o Contrast    Impression    IMPRESSION:  1.  Redemonstrated acute to subacute infarction right basal ganglia and corona radiata. No acute hemorrhage.  2.  Moderate to advanced chronic microvascular ischemic changes.         Discharge Orders        Adult Oncology/Hematology  Referral      General info for SNF    Length of Stay Estimate: Short Term Care: Estimated # of Days <30  Condition at Discharge: Improving  Level of care:skilled   Rehabilitation Potential: Excellent  Admission H&P remains valid and up-to-date: Yes  Recent Chemotherapy: N/A  Use Nursing Home Standing Orders: Yes     Mantoux instructions    Give two-step Mantoux (PPD) Per Facility Policy Yes     Activity - Up with nursing assistance     Reason for your hospital stay    You were admitted with a stroke.  You were also found to have a breast mass which is being worked up for cancer and you will need follow-up with oncology      Advance Diet as Tolerated    Follow this diet upon discharge: Orders Placed This Encounter      Room Service      Regular Diet Adult Liquidized/Moderately Thick (level 3)         Examination   Physical Exam   Temp:  [97.6  F (36.4  C)-99.1  F (37.3  C)] 97.6  F (36.4  C)  Pulse:  [93-95] 94  Resp:  [16-18] 18  BP: (131-144)/(72-86) 131/80  SpO2:  [93 %] 93 %  Wt Readings from Last 1 Encounters:   No data found for Wt       No issues overnight.    Please see EMR for more detailed significant labs, imaging, consultant notes etc.    IHerrera MD, personally saw the patient today and spent greater than 30 minutes discharging this patient.    Herrera Huggins MD  [facility]    CC:System, Provider Not In

## 2022-09-03 NOTE — PLAN OF CARE
Problem: Plan of Care - These are the overarching goals to be used throughout the patient stay.    Goal: Optimal Comfort and Wellbeing  Outcome: Ongoing, Progressing     Problem: Bowel Elimination Impaired (Stroke, Ischemic/Transient Ischemic Attack)  Goal: Effective Bowel Elimination  Outcome: Ongoing, Progressing     Problem: Cerebral Tissue Perfusion (Stroke, Ischemic/Transient Ischemic Attack)  Goal: Optimal Cerebral Tissue Perfusion  Outcome: Ongoing, Progressing     Problem: Urinary Elimination Impaired (Stroke, Ischemic/Transient Ischemic Attack)  Goal: Effective Urinary Elimination  Outcome: Ongoing, Progressing   Goal Outcome Evaluation:      Pt has denied pain overnight. NIHSS every 4 hours, scoring 11 for left-sided weakness and numbness, facial droop, aphasia, and slurred speech. PureWick in place, and pt had 1 incontinent stool. NSR on tele monitor, SCDs on, bed alarm on, 2-3 SR up, assist of 1.

## 2022-09-03 NOTE — PLAN OF CARE
Problem: Plan of Care - These are the overarching goals to be used throughout the patient stay.    Goal: Optimal Comfort and Wellbeing  Outcome: Ongoing, Progressing  Intervention: Provide Person-Centered Care  Recent Flowsheet Documentation  Taken 9/2/2022 2005 by Migdalia Harrison RN  Trust Relationship/Rapport: care explained  Taken 9/2/2022 1705 by Migdalia Harrison, RN  Trust Relationship/Rapport: care explained   Goal Outcome Evaluation:        Patient AAO. Denies pain. VSS. Continues on special precautions for COVID-19. On telemetry monitoring, NSR. Patient NIHSS score this evening was 11 d/t left-sided hemiplegia and facial droop, slurred speech, and numbness to left upper and lower extremities. PIV in left arm SL. Call light within reach. Will continue with plan of care.

## 2022-09-03 NOTE — PLAN OF CARE
Problem: Plan of Care - These are the overarching goals to be used throughout the patient stay.    Goal: Plan of Care Review/Shift Note  Description: The Plan of Care Review/Shift note should be completed every shift.  The Outcome Evaluation is a brief statement about your assessment that the patient is improving, declining, or no change.  This information will be displayed automatically on your shift note.  Outcome: Ongoing, Progressing     Problem: Plan of Care - These are the overarching goals to be used throughout the patient stay.    Goal: Absence of Hospital-Acquired Illness or Injury  Intervention: Prevent and Manage VTE (Venous Thromboembolism) Risk  Recent Flowsheet Documentation  Taken 9/3/2022 1332 by Leighton Ortiz, RN  Activity Management: activity adjusted per tolerance     Problem: Adjustment to Illness (Stroke, Ischemic/Transient Ischemic Attack)  Goal: Optimal Coping  Outcome: Ongoing, Progressing     Problem: Cognitive Impairment (Stroke, Ischemic/Transient Ischemic Attack)  Goal: Optimal Cognitive Function  Outcome: Ongoing, Progressing     Problem: Communication Impairment (Stroke, Ischemic/Transient Ischemic Attack)  Goal: Improved Communication Skills  Outcome: Ongoing, Progressing   Goal Outcome Evaluation:    Patient is alert and oriented x4, has some word finding issue, speech is slurred, has left sided weakness abd left facial droop. NIH 11 and tele monitor shows NSR. Pt was supposed to be discharge to TCU at 1400 and Tele  box and PIV were removed but it changed to tomorrow. Notified pt. Dressing to left breast mass was changed at 1330 and 1 hr later the site was bleeding in large amount. Site was cleaned again with NS and dry gauze, ABD and pressure tape applied. No further bleeding. Explained meds and care plan to patient.

## 2022-09-03 NOTE — PROGRESS NOTES
Sw received a call from Gary at [facility] admissions, the request is from 2-3 ride change due to staffing. Transport changed to 2:00 PM. Gary updated with time change. Charge updated.

## 2022-09-04 ENCOUNTER — APPOINTMENT (OUTPATIENT)
Dept: PHYSICAL THERAPY | Facility: HOSPITAL | Age: 67
DRG: 064 | End: 2022-09-04
Payer: COMMERCIAL

## 2022-09-04 PROCEDURE — 250N000011 HC RX IP 250 OP 636: Performed by: INTERNAL MEDICINE

## 2022-09-04 PROCEDURE — 97110 THERAPEUTIC EXERCISES: CPT | Mod: GP

## 2022-09-04 PROCEDURE — 97530 THERAPEUTIC ACTIVITIES: CPT | Mod: GP

## 2022-09-04 PROCEDURE — 250N000013 HC RX MED GY IP 250 OP 250 PS 637: Performed by: EMERGENCY MEDICINE

## 2022-09-04 PROCEDURE — 99232 SBSQ HOSP IP/OBS MODERATE 35: CPT | Performed by: EMERGENCY MEDICINE

## 2022-09-04 PROCEDURE — 120N000001 HC R&B MED SURG/OB

## 2022-09-04 PROCEDURE — 250N000013 HC RX MED GY IP 250 OP 250 PS 637: Performed by: INTERNAL MEDICINE

## 2022-09-04 RX ORDER — LISINOPRIL 20 MG/1
20 TABLET ORAL AT BEDTIME
Status: DISCONTINUED | OUTPATIENT
Start: 2022-09-04 | End: 2022-09-05 | Stop reason: HOSPADM

## 2022-09-04 RX ADMIN — HEPARIN SODIUM 5000 UNITS: 5000 INJECTION, SOLUTION INTRAVENOUS; SUBCUTANEOUS at 00:09

## 2022-09-04 RX ADMIN — LISINOPRIL 20 MG: 20 TABLET ORAL at 21:16

## 2022-09-04 RX ADMIN — CLOPIDOGREL BISULFATE 75 MG: 75 TABLET ORAL at 09:57

## 2022-09-04 RX ADMIN — HEPARIN SODIUM 5000 UNITS: 5000 INJECTION, SOLUTION INTRAVENOUS; SUBCUTANEOUS at 13:27

## 2022-09-04 RX ADMIN — ATORVASTATIN CALCIUM 80 MG: 40 TABLET, FILM COATED ORAL at 21:16

## 2022-09-04 RX ADMIN — Medication 81 MG: at 09:57

## 2022-09-04 ASSESSMENT — ACTIVITIES OF DAILY LIVING (ADL)
ADLS_ACUITY_SCORE: 31

## 2022-09-04 NOTE — PROGRESS NOTES
SW received a call from Gary in admissions at [facility]. Pt able to be admitted on 9/5.   [facility] stretcher ride set for 10 AM    ZAYDA Coe

## 2022-09-04 NOTE — PLAN OF CARE
"PRIMARY DIAGNOSIS: \"GENERIC\" NURSING  OUTPATIENT/OBSERVATION GOALS TO BE MET BEFORE DISCHARGE:  1. ADLs back to baseline: No    2. Activity and level of assistance: Up with maximum assistance. Consider SW and/or PT evaluation.     3. Pain status: Pain free.    4. Return to near baseline physical activity: No     Discharge Planner Nurse   Safe discharge environment identified: Yes  Barriers to discharge: Yes       Entered by: Leighton Ortiz RN 09/04/2022 4:07 PM     Please review provider order for any additional goals.   Nurse to notify provider when observation goals have been met and patient is ready for discharge.Goal Outcome Evaluation:                      "

## 2022-09-04 NOTE — PLAN OF CARE
Problem: Plan of Care - These are the overarching goals to be used throughout the patient stay.    Goal: Plan of Care Review/Shift Note  Description: The Plan of Care Review/Shift note should be completed every shift.  The Outcome Evaluation is a brief statement about your assessment that the patient is improving, declining, or no change.  This information will be displayed automatically on your shift note.  Outcome: Ongoing, Progressing     Problem: Functional Ability Impaired (Stroke, Ischemic/Transient Ischemic Attack)  Goal: Optimal Functional Ability  Outcome: Ongoing, Progressing  Intervention: Optimize Functional Ability  Recent Flowsheet Documentation  Taken 9/4/2022 1300 by Leighton Ortiz, RN  Activity Management: activity adjusted per tolerance  Taken 9/4/2022 0900 by Leighton Ortiz, RN  Activity Management: activity adjusted per tolerance     Problem: Swallowing Impairment (Stroke, Ischemic/Transient Ischemic Attack)  Goal: Optimal Eating and Swallowing without Aspiration  Outcome: Ongoing, Progressing   Goal Outcome Evaluation:  Patient is alert and oriented x4, her speech slurred. She was frustrated x1 regarding discharge plan changed to Tuesday. She wants to get things done. NIH 11, pt continues to have left sided weakness and left facial droop. She denied of any pain. Pt has weak and nonproductive cough at time. Explained meds and care plan to patient.

## 2022-09-04 NOTE — PROGRESS NOTES
Daily Progress Note    Assessment/Plan:  67F with dyslipidemia, HTN, breast mass, and GILDA who presented to ED 8/30/22 with stroke symptoms.  Tested positive for COVID on admission but is asymptomatic        #Acute ischemic stroke - presented with left-sided partial hemiparesis and mild dysarthria/expressive aphasia.  Acute ischemic stroke seen on MRI, no intervention with tPA or thrombectomy due to time of onset and no large vessel occlusion seen on CTA.  Echo was normal.  Started on Plavix and aspirin and statin.  No A. fib on telemetry.  Needs TCU for acute rehab       #dysphagia - moderately thick fluids     #Left Breast mass - very likely breast CA.  FNA completed by surgery 9/2.  Referral to oncology outpatient.  Surgery rec'd neoadjuvent treatment with hopes of shrinking tumor prior to surgery.     #Hyperlipidemia -statin high-dose    #Hypertension - initially allowed blood pressure to run high first 24 hours but starting to bring it down slowly - started lisinopril with poor control-we will increase to 20 mg today.,.      #GILDA (obstructive sleep apnea) - doesn't use CPAP.     #Infection due to 2019 novel coronavirus - she has had very minimal cough without hypoxia.  Completed remdesivir x 3 days.  Could possibly come off quarantine September 9 with positive test on August 30 with preceding cold symptoms for 3 days prior.    Code status:Full Code        Barriers to Discharge: Placement.  Was excepted to TCU yesterday but facility canceled this just prior to discharge.    Disposition: Discharge to TCU whenever appropriate facility found    Subjective:  Stephani is new to me today, chart reviewed and discussed with staff.  She reports no acute changes.  Denies any cough or wheezing or shortness of breath.  No fevers or chills or cold symptoms.  No body aches.  She denies any COVID symptoms.  Still has residual effects from her stroke.        Current Medications Reviewed via EHR List    Objective:  Vital signs in  last 24 hours:  [unfilled]  .prog  Weight:   @THISENCWEIGHTS(1)@  Weight change:   Body mass index is 28.06 kg/m .    Intake/Output last 3 shifts:  I/O last 3 completed shifts:  In: 240 [P.O.:240]  Out: 200 [Urine:200]  Intake/Output this shift:  No intake/output data recorded.    Physical Exam:  General: No apparent distress  CV:  Lungs: Clear to auscultation  Abdomen:  Neurologic: Mild intermittent dysarthria-stable per patient      Imaging:  Personally Reviewed.  Echocardiogram Complete - For age > 60 yrs    Result Date: 2022  [id number]^JENELLE^[place] [address]  Name: LOWELL PERSAUD MRN: [id number] : 1955 Study Date: 2022 11:17 AM Age: 67 yrs Gender: Female Patient Location: [facility] Reason For Study: Cerebrovascular Incident Ordering Physician: SAVITA ALMANZAR Referring Physician: SAVITA ALMANZAR Performed By: BRENNA  BSA: 1.9 m2 Height: 67 in Weight: 172 lb HR: 111 ______________________________________________________________________________ Procedure Complete Echo Adult. ______________________________________________________________________________ Interpretation Summary  1. Normal left ventricular size and systolic performance with a visually estimated ejection fraction of 65%. 2. No significant valvular heart disease is identified on this study. 3. Normal right ventricular size and systolic performance. ______________________________________________________________________________ Left ventricle: Normal left ventricular size and systolic performance with a visually estimated ejection fraction of 65%. There is normal regional wall motion. Left ventricular wall thickness is normal.  Assessment of LV Diastolic Function: The cumulative findings suggest normal diastolic filling [The septal e' velocity is < 7 cm/s & lateral e' velocity is < 10 cm/s. The average E/e' is < 14. The TR velocity cannot be determined due to  insufficient tricuspid insufficiency signal. Left atrial volume index is less than 34 mL/mÂ ].  Right ventricle: Normal right ventricular size and systolic performance.  Left atrium: The left atrium is of normal size.  Right atrium: The right atrium is of normal size.  IVC: The IVC is not well-visualized.  Aortic valve: The aortic valve is not well visualized, but suspected to be comprised of three cusps. No significant aortic stenosis or aortic insufficiency is detected on this study.  Mitral valve: The mitral valve appears morphologically normal. There is trace mitral insufficiency.  Tricuspid valve: The tricuspid valve is grossly morphologically normal. There is trace tricuspid insufficiency.  Pulmonic valve: The pulmonic valve is grossly morphologically normal.  Thoracic aorta: The aortic root and proximal ascending aorta are of normal dimension.  Pericardium: There is no significant pericardial effusion. ______________________________________________________________________________ ______________________________________________________________________________ MMode/2D Measurements & Calculations IVSd: 1.1 cm LVIDd: 3.8 cm LVIDs: 2.1 cm LVPWd: 1.1 cm FS: 44.8 % LV mass(C)d: 141.0 grams LV mass(C)dI: 74.4 grams/m2 Ao root diam: 3.2 cm LA dimension: 2.7 cm asc Aorta Diam: 3.1 cm LA/Ao: 0.87 LVOT diam: 1.9 cm LVOT area: 2.7 cm2 LA Volume Indexed (AL/bp): 31.2 ml/m2  RWT: 0.57  Time Measurements MM HR: 109.0 BPM  Doppler Measurements & Calculations MV E max monico: 50.4 cm/sec MV A max monico: 108.2 cm/sec MV E/A: 0.47 MV dec slope: 298.5 cm/sec2 MV dec time: 0.17 sec Ao V2 max: 130.3 cm/sec Ao max P.0 mmHg Ao V2 mean: 99.8 cm/sec Ao mean P.4 mmHg Ao V2 VTI: 19.0 cm JENNIFER(I,D): 2.6 cm2 JENNIFER(V,D): 2.3 cm2 LV V1 max P.8 mmHg LV V1 max: 109.6 cm/sec LV V1 VTI: 17.9 cm SV(LVOT): 49.0 ml SI(LVOT): 25.8 ml/m2 AV Monico Ratio (DI): 0.84 JENNIFER Index (cm2/m2): 1.4 E/E' av.1 Lateral E/e': 8.4 Medial E/e': 9.8   ______________________________________________________________________________ Report approved by: Raissa Drake 08/31/2022 12:32 PM       XR Chest Port 1 View    Result Date: 8/30/2022  EXAM: XR CHEST PORT 1 VIEW LOCATION: [facility] DATE/TIME: 8/30/2022 4:45 PM INDICATION: cough, mild hypoxemia, decreased breath sounds on left COMPARISON: None.     IMPRESSION: Lungs are clear. Heart and pulmonary vascularity are normal. No signs of acute disease.    MR Brain w/o & w Contrast    Result Date: 8/30/2022  EXAM: MR BRAIN W/O and W CONTRAST LOCATION: [facility] DATE/TIME: 8/30/2022 8:51 PM INDICATION: Left-sided weakness, slurred speech. COMPARISON: Head CT same day CONTRAST: 7mL gadavist TECHNIQUE: Routine multiplanar multisequence head MRI without and with intravenous contrast. FINDINGS: INTRACRANIAL CONTENTS: There is a cluster of small recent ischemic infarcts involving the deep white matter of the right frontal lobe extending down into the right basal ganglia. No other recent infarcts. No mass, acute hemorrhage, or extra-axial fluid collections. Scattered nonspecific T2/FLAIR hyperintensities within the cerebral white matter most consistent with mild chronic microvascular ischemic change. Mild generalized cerebral atrophy. No hydrocephalus. Normal position of the cerebellar tonsils.  No pathologic contrast enhancement. SELLA: No abnormality accounting for technique. OSSEOUS STRUCTURES/SOFT TISSUES: Normal marrow signal. The major intracranial vascular flow voids are maintained. ORBITS: No abnormality accounting for technique. SINUSES/MASTOIDS: No paranasal sinus mucosal disease. No middle ear or mastoid effusion.     IMPRESSION: 1.  Cluster of small recent ischemic infarcts involving the deep white matter of the right frontal lobe and right basal ganglia. 2.  No other recent infarcts. 3.  Age-related changes.    CTA Head Neck with Contrast    Result Date:  8/30/2022  EXAM: CTA HEAD NECK W CONTRAST LOCATION: [facility] DATE/TIME: 8/30/2022 2:30 PM INDICATION: Weakness. Stroke evaluation. COMPARISON: None available at time of dictation. CONTRAST: 75 mL Isovue-370 TECHNIQUE: Head and neck CT angiogram with IV contrast. Noncontrast head CT followed by axial helical CT images of the head and neck vessels obtained during the arterial phase of intravenous contrast administration. Axial 2D reconstructed images and multiplanar 3D MIP reconstructed images of the head and neck vessels were performed by the technologist. Dose reduction techniques were used. All stenosis measurements made according to NASCET criteria unless otherwise specified. FINDINGS: NONCONTRAST HEAD CT: INTRACRANIAL CONTENTS: No acute intracranial hemorrhage. No CT evidence of acute infarct. Normal ventricles without hydrocephalus. No extra-axial fluid collection. Patent basal cisterns. VISUALIZED ORBITS/SINUSES/MASTOIDS: The orbits are unremarkable. The visualized paranasal sinuses and temporal bone structures are well-aerated. BONES/SOFT TISSUES: The calvarium and skull base are unremarkable. HEAD CTA: ANTERIOR CIRCULATION: No stenosis/occlusion, aneurysm, or high flow vascular malformation. The anterior communicating artery is patent. Fetal origin of the bilateral posterior cerebral arteries. POSTERIOR CIRCULATION: No stenosis/occlusion, aneurysm, or high flow vascular malformation. Balanced vertebrobasilar circulation. The basilar artery is unremarkable and gives rise to patent superior cerebellar and hypoplastic P1 segments of the posterior  cerebral arteries. DURAL VENOUS SINUSES: Patent. NECK CTA: RIGHT CAROTID: Normal course and persist caliber of the common carotid artery. Atherosclerotic plaque results in approximately 30% stenosis of the ICA origin. The remaining extracranial internal carotid artery demonstrate persistent caliber without evidence of dissection. LEFT  CAROTID: Normal course and persist caliber of the common carotid artery. Normal course and persist caliber of the extracranial internal carotid artery without evidence of stenosis or dissection. VERTEBRAL ARTERIES: Balance configuration without stenosis or dissection. AORTIC ARCH: Classic three-vessel arch. The origins of the great vessels are unremarkable without significant stenosis. NONVASCULAR STRUCTURES: There are no masses or enlarged lymph nodes in the neck. The submandibular, parotid, and thyroid glands are unremarkable. Multilevel degenerative changes without high-grade spinal canal stenosis or neural foraminal narrowing. No aggressive osseous lesions. The visualized lungs are unremarkable.     IMPRESSION: HEAD CT: 1. No acute intracranial abnormality. HEAD CTA: 1. Variant anatomy, otherwise unremarkable intracranial vasculature. NECK CTA: 1. 30% stenosis of the right ICA origin. - - - - - - - - - - - - - - - - - - - - - - - - - - - - - - - - - - - - - - - - - - - - - - - - - - - - - - - - - - - - - - - - - - - - - - - - - - - - The results above were discussed with Dr. Beltre on 8/30/2022 2:39 PM by Dr. Jabari Sams. - - - - - - - - - - - - - - - - - - - - - - - - - - - - - - - - - - - - - - - - - - - - - - - - - - - - - - - - - - - - - - - - - - - - - - - - - - - -     CT Head w/o Contrast    Result Date: 8/31/2022  EXAM: CT HEAD W/O CONTRAST LOCATION: [facility] DATE/TIME: 8/31/2022 3:34 PM INDICATION: Stroke with worsening symptoms. COMPARISON: Brain MRI 08/30/2022. TECHNIQUE: Routine CT Head without IV contrast. Multiplanar reformats. Dose reduction techniques were used. FINDINGS: INTRACRANIAL CONTENTS: No intracranial hemorrhage, extraaxial collection, or mass effect.  Acute to subacute infarction right basal ganglia and corona radiata. Chronic lacunar infarctions within the thalami. Moderate to advanced presumed chronic small vessel ischemic changes. Normal ventricles and  sulci. VISUALIZED ORBITS/SINUSES/MASTOIDS: No intraorbital abnormality. No paranasal sinus mucosal disease. No middle ear or mastoid effusion. BONES/SOFT TISSUES: No acute abnormality.     IMPRESSION: 1.  Redemonstrated acute to subacute infarction right basal ganglia and corona radiata. No acute hemorrhage. 2.  Moderate to advanced chronic microvascular ischemic changes.    XR Video Swallow with SLP or OT    Result Date: 9/2/2022  EXAM: XR VIDEO SWALLOW WITH SLP OR OT LOCATION: [facility] DATE/TIME: 9/2/2022 10:28 AM INDICATION: Difficulty swallowing. COMPARISON: None. TECHNIQUE: Routine swallow study with speech pathology using multiple barium thicknesses. FINDINGS: FLUOROSCOPIC TIME: 1.1 minutes NUMBER OF IMAGES: 0 Swallow study with Speech Pathology using nectar liquid, honey, pudding and cracker barium thicknesses. On the initial trial of nectar liquid consistency barium patient had silent tracheal aspiration. Subsequent trial of nectar liquid consistency barium with a small spoonful showed only mild transient penetration. No penetration or aspiration with honey, pudding or cracker consistency barium. Please refer to Speech Therapy note for additional detail.      Lab Results:  Personally Reviewed.   Fingerstick Blood Glucose: @WFQOVCY72HED(POCGLUFGR:10)@    Last Hbg A1C: No results found for: HGBA1C   Lab Results   Component Value Date    INR 1.00 08/30/2022     No results found for this or any previous visit (from the past 24 hour(s)).        Advanced Care Planning    Time > 25 minutes with greater than 50% of time spent in counseling and coordination of care.    Santi Rios MD  Date: 9/4/2022  Time: 2:40 PM  [facility]  Family Medicine

## 2022-09-04 NOTE — PLAN OF CARE
"PRIMARY DIAGNOSIS: \"GENERIC\" NURSING  OUTPATIENT/OBSERVATION GOALS TO BE MET BEFORE DISCHARGE:  ADLs back to baseline: No    Activity and level of assistance: Up with maximum assistance. Consider SW and/or PT evaluation.     Pain status: Pain free.    Return to near baseline physical activity: No     Discharge Planner Nurse   Safe discharge environment identified: Yes  Barriers to discharge: Yes       Entered by: Leighton Ortiz RN 09/04/2022 10:06 AM     Please review provider order for any additional goals.   Nurse to notify provider when observation goals have been met and patient is ready for discharge.Goal Outcome Evaluation:                      "

## 2022-09-04 NOTE — PLAN OF CARE
Goal Outcome Evaluation:    Stroke pt stable, /88 at 1600.  NIHSS of 11.  Pt has left side weakness, facial droop, word finding issues and slurred speech.  Left arm pt can slightly move side to side, left leg pt can now lift about a cm off the bed.  Could only move it side to side on the bed previously.  Pt has left side numbness.  Purewick in place with brief.  Tele was Dc'd along with IV since pt was supposed to discharge today.  Transport didn't have the staff available so pt will discharge to acute rehab tomorrow.  Pt on honey thick liquids, has a weak cough.  Lungs diminished in bases.  Pt on room air, covid isolation.    Problem: Plan of Care - These are the overarching goals to be used throughout the patient stay.    Goal: Plan of Care Review/Shift Note  Description: The Plan of Care Review/Shift note should be completed every shift.  The Outcome Evaluation is a brief statement about your assessment that the patient is improving, declining, or no change.  This information will be displayed automatically on your shift note.  9/3/2022 2211 by Beverley Mabry RN  Outcome: Ongoing, Progressing  9/3/2022 2206 by Beverley Mabry RN  Outcome: Ongoing, Progressing     Problem: Plan of Care - These are the overarching goals to be used throughout the patient stay.    Goal: Absence of Hospital-Acquired Illness or Injury  Intervention: Identify and Manage Fall Risk  Recent Flowsheet Documentation  Taken 9/3/2022 1605 by Beverley Mabry RN  Safety Promotion/Fall Prevention:   assistive device/personal items within reach   bed alarm on   safety round/check completed   supervised activity     Problem: Plan of Care - These are the overarching goals to be used throughout the patient stay.    Goal: Absence of Hospital-Acquired Illness or Injury  Intervention: Prevent Skin Injury  Recent Flowsheet Documentation  Taken 9/3/2022 2043 by Beverley Mabry, RN  Body Position:   turned   supine  Taken 9/3/2022 1720 by  Beverley Mabry RN  Body Position: position changed independently  Taken 9/3/2022 1605 by Beverley Mabry RN  Body Position:   position changed independently   right     Problem: Adjustment to Illness (Stroke, Ischemic/Transient Ischemic Attack)  Goal: Optimal Coping  9/3/2022 2211 by Beverley Mabry RN  Outcome: Ongoing, Progressing  9/3/2022 2206 by Beverley Mabry RN  Outcome: Ongoing, Progressing     Problem: Communication Impairment (Stroke, Ischemic/Transient Ischemic Attack)  Goal: Improved Communication Skills  9/3/2022 2211 by Beverley Mabry RN  Outcome: Ongoing, Progressing  9/3/2022 2206 by Beverley Mabry RN  Outcome: Ongoing, Progressing     Problem: Functional Ability Impaired (Stroke, Ischemic/Transient Ischemic Attack)  Goal: Optimal Functional Ability  9/3/2022 2211 by Beverley Mabry RN  Outcome: Ongoing, Progressing  9/3/2022 2206 by Beverley Mabry RN  Outcome: Ongoing, Progressing  Intervention: Optimize Functional Ability  Recent Flowsheet Documentation  Taken 9/3/2022 1605 by Beverley Mabry RN  Activity Management: activity adjusted per tolerance     Problem: Hypertension Comorbidity  Goal: Blood Pressure in Desired Range  Outcome: Ongoing, Progressing  Intervention: Maintain Blood Pressure Management  Recent Flowsheet Documentation  Taken 9/3/2022 1605 by Beverley Mabry RN  Medication Review/Management: medications reviewed

## 2022-09-04 NOTE — PLAN OF CARE
Problem: Plan of Care - These are the overarching goals to be used throughout the patient stay.    Goal: Absence of Hospital-Acquired Illness or Injury  Outcome: Ongoing, Progressing  Intervention: Identify and Manage Fall Risk  Recent Flowsheet Documentation  Taken 9/4/2022 0427 by Georgia Steele RN  Safety Promotion/Fall Prevention:   assistive device/personal items within reach   bed alarm on   clutter free environment maintained   fall prevention program maintained   lighting adjusted   room near nurse's station   safety round/check completed  Taken 9/4/2022 0010 by Georgia Steele RN  Safety Promotion/Fall Prevention:   assistive device/personal items within reach   bed alarm on   clutter free environment maintained   fall prevention program maintained   lighting adjusted   room near nurse's station   safety round/check completed  Intervention: Prevent Skin Injury  Recent Flowsheet Documentation  Taken 9/4/2022 0427 by Georgia Steele RN  Body Position:   position changed independently   supine, head elevated  Taken 9/4/2022 0010 by Georgia Steele RN  Body Position:   position changed independently   supine, head elevated  Intervention: Prevent and Manage VTE (Venous Thromboembolism) Risk  Recent Flowsheet Documentation  Taken 9/4/2022 0427 by Georgia Steele RN  VTE Prevention/Management: SCDs (sequential compression devices) off  Activity Management: activity adjusted per tolerance  Taken 9/4/2022 0010 by Georgia Steele RN  VTE Prevention/Management: SCDs (sequential compression devices) off  Activity Management: activity adjusted per tolerance  Goal: Optimal Comfort and Wellbeing  Outcome: Ongoing, Progressing     Problem: Cerebral Tissue Perfusion (Stroke, Ischemic/Transient Ischemic Attack)  Goal: Optimal Cerebral Tissue Perfusion  Outcome: Ongoing, Progressing     Problem: Communication Impairment (Stroke, Ischemic/Transient Ischemic Attack)  Goal: Improved Communication Skills  Outcome: Ongoing, Progressing      Problem: Urinary Elimination Impaired (Stroke, Ischemic/Transient Ischemic Attack)  Goal: Effective Urinary Elimination  Outcome: Ongoing, Progressing   Goal Outcome Evaluation:      Uneventful shift. VSS, NIHSS stable, scoring 11 for left-sided extremity weakness, slurred speech, droop, and word-finding difficulties (that have improved since the previous night). Both left-sided extremities can move side to side on the bed, and left leg can lift off the bed slightly for a few seconds. Pt denied pain and nausea. PureWick in place, changed at 0430.

## 2022-09-05 ENCOUNTER — HOSPITAL ENCOUNTER (INPATIENT)
Facility: CLINIC | Age: 67
LOS: 25 days | Discharge: HOME HEALTH | DRG: 056 | End: 2022-09-30
Attending: PHYSICAL MEDICINE & REHABILITATION | Admitting: PHYSICAL MEDICINE & REHABILITATION
Payer: COMMERCIAL

## 2022-09-05 VITALS
DIASTOLIC BLOOD PRESSURE: 77 MMHG | HEIGHT: 64 IN | TEMPERATURE: 98.9 F | HEART RATE: 88 BPM | WEIGHT: 163.5 LBS | OXYGEN SATURATION: 93 % | SYSTOLIC BLOOD PRESSURE: 153 MMHG | BODY MASS INDEX: 27.91 KG/M2 | RESPIRATION RATE: 18 BRPM

## 2022-09-05 DIAGNOSIS — R29.90 STROKE-LIKE SYMPTOM: ICD-10-CM

## 2022-09-05 DIAGNOSIS — C50.912 MALIGNANT NEOPLASM OF LEFT BREAST IN FEMALE, ESTROGEN RECEPTOR POSITIVE, UNSPECIFIED SITE OF BREAST (H): Primary | ICD-10-CM

## 2022-09-05 DIAGNOSIS — I63.9 ACUTE ISCHEMIC STROKE (H): ICD-10-CM

## 2022-09-05 DIAGNOSIS — I63.9 INFARCTION OF RIGHT BASAL GANGLIA (H): ICD-10-CM

## 2022-09-05 DIAGNOSIS — Z17.0 MALIGNANT NEOPLASM OF LEFT BREAST IN FEMALE, ESTROGEN RECEPTOR POSITIVE, UNSPECIFIED SITE OF BREAST (H): Primary | ICD-10-CM

## 2022-09-05 LAB
ANION GAP SERPL CALCULATED.3IONS-SCNC: 10 MMOL/L (ref 5–18)
BUN SERPL-MCNC: 21 MG/DL (ref 8–22)
CALCIUM SERPL-MCNC: 9.1 MG/DL (ref 8.5–10.5)
CHLORIDE BLD-SCNC: 111 MMOL/L (ref 98–107)
CO2 SERPL-SCNC: 24 MMOL/L (ref 22–31)
CREAT SERPL-MCNC: 0.74 MG/DL (ref 0.6–1.1)
GFR SERPL CREATININE-BSD FRML MDRD: 88 ML/MIN/1.73M2
GLUCOSE BLD-MCNC: 104 MG/DL (ref 70–125)
GLUCOSE BLDC GLUCOMTR-MCNC: 117 MG/DL (ref 70–99)
PLATELET # BLD AUTO: 263 10E3/UL (ref 150–450)
POTASSIUM BLD-SCNC: 3.6 MMOL/L (ref 3.5–5)
SODIUM SERPL-SCNC: 145 MMOL/L (ref 136–145)

## 2022-09-05 PROCEDURE — 85049 AUTOMATED PLATELET COUNT: CPT | Performed by: INTERNAL MEDICINE

## 2022-09-05 PROCEDURE — 36415 COLL VENOUS BLD VENIPUNCTURE: CPT | Performed by: EMERGENCY MEDICINE

## 2022-09-05 PROCEDURE — 250N000013 HC RX MED GY IP 250 OP 250 PS 637: Performed by: INTERNAL MEDICINE

## 2022-09-05 PROCEDURE — 250N000013 HC RX MED GY IP 250 OP 250 PS 637

## 2022-09-05 PROCEDURE — 128N000003 HC R&B REHAB

## 2022-09-05 PROCEDURE — 250N000011 HC RX IP 250 OP 636: Performed by: INTERNAL MEDICINE

## 2022-09-05 PROCEDURE — 99223 1ST HOSP IP/OBS HIGH 75: CPT | Mod: GC | Performed by: PHYSICAL MEDICINE & REHABILITATION

## 2022-09-05 PROCEDURE — 80048 BASIC METABOLIC PNL TOTAL CA: CPT | Performed by: EMERGENCY MEDICINE

## 2022-09-05 PROCEDURE — 250N000011 HC RX IP 250 OP 636

## 2022-09-05 PROCEDURE — 99239 HOSP IP/OBS DSCHRG MGMT >30: CPT | Performed by: EMERGENCY MEDICINE

## 2022-09-05 RX ORDER — AMOXICILLIN 250 MG
1-4 CAPSULE ORAL 2 TIMES DAILY PRN
Status: DISCONTINUED | OUTPATIENT
Start: 2022-09-05 | End: 2022-09-26

## 2022-09-05 RX ORDER — LISINOPRIL 20 MG/1
20 TABLET ORAL AT BEDTIME
Qty: 30 TABLET | Refills: 0 | Status: ON HOLD | OUTPATIENT
Start: 2022-09-05 | End: 2022-09-29

## 2022-09-05 RX ORDER — ASPIRIN 81 MG/1
81 TABLET ORAL DAILY
Status: DISCONTINUED | OUTPATIENT
Start: 2022-09-06 | End: 2022-09-30 | Stop reason: HOSPADM

## 2022-09-05 RX ORDER — POLYETHYLENE GLYCOL 3350 17 G/17G
17 POWDER, FOR SOLUTION ORAL DAILY PRN
Status: DISCONTINUED | OUTPATIENT
Start: 2022-09-05 | End: 2022-09-22

## 2022-09-05 RX ORDER — ACETAMINOPHEN 325 MG/1
650 TABLET ORAL EVERY 6 HOURS PRN
Status: DISCONTINUED | OUTPATIENT
Start: 2022-09-05 | End: 2022-09-30 | Stop reason: HOSPADM

## 2022-09-05 RX ORDER — PANTOPRAZOLE SODIUM 40 MG/1
40 TABLET, DELAYED RELEASE ORAL
Status: DISCONTINUED | OUTPATIENT
Start: 2022-09-06 | End: 2022-09-30 | Stop reason: HOSPADM

## 2022-09-05 RX ORDER — HEPARIN SODIUM 5000 [USP'U]/.5ML
5000 INJECTION, SOLUTION INTRAVENOUS; SUBCUTANEOUS EVERY 12 HOURS
Status: DISCONTINUED | OUTPATIENT
Start: 2022-09-05 | End: 2022-09-30 | Stop reason: HOSPADM

## 2022-09-05 RX ORDER — ATORVASTATIN CALCIUM 80 MG/1
80 TABLET, FILM COATED ORAL EVERY EVENING
Status: DISCONTINUED | OUTPATIENT
Start: 2022-09-05 | End: 2022-09-30 | Stop reason: HOSPADM

## 2022-09-05 RX ORDER — LISINOPRIL 20 MG/1
20 TABLET ORAL AT BEDTIME
Status: DISCONTINUED | OUTPATIENT
Start: 2022-09-05 | End: 2022-09-16

## 2022-09-05 RX ORDER — CLOPIDOGREL BISULFATE 75 MG/1
75 TABLET ORAL DAILY
Status: DISCONTINUED | OUTPATIENT
Start: 2022-09-06 | End: 2022-09-30 | Stop reason: HOSPADM

## 2022-09-05 RX ADMIN — HEPARIN SODIUM 5000 UNITS: 5000 INJECTION, SOLUTION INTRAVENOUS; SUBCUTANEOUS at 00:58

## 2022-09-05 RX ADMIN — CLOPIDOGREL BISULFATE 75 MG: 75 TABLET ORAL at 08:39

## 2022-09-05 RX ADMIN — HEPARIN SODIUM 5000 UNITS: 5000 INJECTION, SOLUTION INTRAVENOUS; SUBCUTANEOUS at 14:18

## 2022-09-05 RX ADMIN — LISINOPRIL 20 MG: 20 TABLET ORAL at 20:37

## 2022-09-05 RX ADMIN — Medication 81 MG: at 08:39

## 2022-09-05 RX ADMIN — ATORVASTATIN CALCIUM 80 MG: 80 TABLET, FILM COATED ORAL at 20:37

## 2022-09-05 ASSESSMENT — ACTIVITIES OF DAILY LIVING (ADL)
ADLS_ACUITY_SCORE: 40
NUMBER_OF_TIMES_PATIENT_HAS_FALLEN_WITHIN_LAST_SIX_MONTHS: 1
ADLS_ACUITY_SCORE: 40
DOING_ERRANDS_INDEPENDENTLY_DIFFICULTY: NO
FALL_HISTORY_WITHIN_LAST_SIX_MONTHS: YES
TOILETING_ASSISTANCE: TOILETING DIFFICULTY, REQUIRES EQUIPMENT
WALKING_OR_CLIMBING_STAIRS_DIFFICULTY: YES
VISION_MANAGEMENT: GLASSES
CONCENTRATING,_REMEMBERING_OR_MAKING_DECISIONS_DIFFICULTY: YES
ADLS_ACUITY_SCORE: 48
ADLS_ACUITY_SCORE: 31
ADLS_ACUITY_SCORE: 31
DIFFICULTY_EATING/SWALLOWING: NO
ADLS_ACUITY_SCORE: 31
TOILETING_ISSUES: YES
ADLS_ACUITY_SCORE: 48
WEAR_GLASSES_OR_BLIND: YES
ADLS_ACUITY_SCORE: 31
ADLS_ACUITY_SCORE: 31
WALKING_OR_CLIMBING_STAIRS: AMBULATION DIFFICULTY, REQUIRES EQUIPMENT
DRESSING/BATHING_DIFFICULTY: YES
DRESSING/BATHING: BATHING DIFFICULTY, REQUIRES EQUIPMENT
ADLS_ACUITY_SCORE: 48
ADLS_ACUITY_SCORE: 31
CHANGE_IN_FUNCTIONAL_STATUS_SINCE_ONSET_OF_CURRENT_ILLNESS/INJURY: YES

## 2022-09-05 NOTE — LETTER
Recipient: [facility]           Sender: Yoselin Barkley PH: [phone], [email or web address]   Home today, orders attached  Thanks!           Date: September 30, 2022  Patient Name:  Stephani Garcia  Patient YOB: 1955  Routing Message:          The documents accompanying this notice contain confidential information belonging to the sender.  This information is intended only for the use of the individual or entity named above.  The authorized recipient of this information is prohibited from disclosing this information to any other party and is required to destroy the information after its stated need has been fulfilled, unless otherwise required by state law.    If you are not the intended recipient, you are hereby notified that any disclosure, copy, distribution or action taken in reliance on the contents of these documents is strictly prohibited.  If you have received this document in error, please return it by fax to [phone] with a note on the cover sheet explaining why you are returning it (e.g. not your patient, not your provider, etc.).  If you need further assistance, please call .  Documents may also be returned by mail to [employer], , [address], [place], [place] [address].

## 2022-09-05 NOTE — PROGRESS NOTES
Care Management Discharge Note    Discharge Date: 09/05/2022      Discharge Disposition: Acute Rehab    Discharge Services: Other (see comment) (therapy services)    Discharge DME: None    Discharge Transportation: other (see comments) (HE Stretcher)    Private pay costs discussed: Not applicable    PAS Confirmation Code:  (N/A)    Patient/family educated on Medicare website which has current facility and service quality ratings: yes    Education Provided on the Discharge Plan:  Yes    Persons Notified of Discharge Plans: pt     Patient/Family in Agreement with the Plan: yes    Handoff Referral Completed: yes    Additional Information: ANTONIO received call from Gary at [facility], who was calling to confirm pt discharge this morning.  ANTONIO informed him that orders were in and ride planned for 10 AM.  Gary informed SW that he was not working today or on site.  If anything comes up, please call nursing station at [phone].      ANTONIO updated charge nurse.         ZAYDA Quijano, VERENICESW 09/05/22 3:28 PM

## 2022-09-05 NOTE — DISCHARGE SUMMARY
[facility] MEDICINE  DISCHARGE SUMMARY     Primary Care Physician: System, Provider Not In  Admission Date: 8/30/2022   Discharge Provider: Blaine Rios MD Discharge Date: 9/5/2022   Diet:   Active Diet and Nourishment Order   Procedures     Room Service     Regular Diet Adult Liquidized/Moderately Thick (level 3)     Advance Diet as Tolerated       Code Status: Full Code   Activity: DCACTIVITY: Activity as tolerated        Condition at Discharge: Stable     REASON FOR PRESENTATION(See Admission Note for Details)   CVA, incidentally positive for COVID    PRINCIPAL & ACTIVE DISCHARGE DIAGNOSES     Principal Problem:    Acute ischemic stroke (H)  Active Problems:    Hyperlipidemia    Hypertension    GILDA (obstructive sleep apnea)    Infection due to 2019 novel coronavirus      PENDING LABS     Unresulted Labs Ordered in the Past 30 Days of this Admission     Date and Time Order Name Status Description    9/2/2022  8:03 AM Cytology, non-gynecologic In process             PROCEDURES ( this hospitalization only)          RECOMMENDATIONS TO OUTPATIENT PROVIDER FOR F/U VISIT     Follow-up Appointments     Follow Up and recommended labs and tests      1.  Follow-up with facility MD  2.  Follow-up with neurology in 6 to 8 weeks  3.  Follow-up with oncology             {Additional follow-up instructions/to-do's for PCP    : Monitor COVID symptoms, assure follow-up for breast mass and pathology results    DISPOSITION     Skilled Nursing Facility    SUMMARY OF HOSPITAL COURSE:      67F with dyslipidemia, HTN, and GILDA who presented to ED 8/30/22 with stroke symptoms.  Tested positive for COVID on admission but is asymptomatic.  Had a breast mass which was biopsied, results pending but very concerning for cancer.        #Acute ischemic stroke - presented with left-sided partial hemiparesis and mild dysarthria/expressive aphasia.  Acute ischemic stroke seen on MRI, no intervention with  tPA or thrombectomy due to time of onset and no large vessel occlusion seen on CTA.  Echo was normal.  Started on Plavix and aspirin and statin.  No A. fib on telemetry.  Needs TCU for acute rehab        #dysphagia - moderately thick fluids     #Left Breast mass - very likely breast CA.  FNA completed by surgery 9/2.  Referral to oncology outpatient.  Surgery rec'd neoadjuvent treatment with hopes of shrinking tumor prior to surgery.     #Hyperlipidemia -statin high-dose     #Hypertension - initially allowed blood pressure to run high first 24 hours but starting to bring it down slowly - started lisinopril with poor control-we will increase to 20 mg today.,.       #GILDA (obstructive sleep apnea) - doesn't use CPAP.      #Infection due to 2019 novel coronavirus - she denies any COVID symptoms today including cough or wheezing or shortness of breath or fevers or chills or body aches or any type of cold symptoms.  Currently completely asymptomatic.  Completed remdesivir x 3 days.  Could possibly come off quarantine September 9 with positive test on August 30 with preceding cold symptoms for 3 days prior.    Discharge Medications with Med changes:     Current Discharge Medication List      START taking these medications    Details   aspirin (ASA) 81 MG EC tablet Take 1 tablet (81 mg) by mouth daily    Associated Diagnoses: Stroke-like symptom      atorvastatin (LIPITOR) 80 MG tablet Take 1 tablet (80 mg) by mouth every evening    Associated Diagnoses: Stroke-like symptom      clopidogrel (PLAVIX) 75 MG tablet Take 1 tablet (75 mg) by mouth daily    Associated Diagnoses: Stroke-like symptom      lisinopril (ZESTRIL) 20 MG tablet Take 1 tablet (20 mg) by mouth At Bedtime  Qty: 30 tablet, Refills: 0    Associated Diagnoses: Hypertension, unspecified type                   Rationale for medication changes:      See summary        Consults       NEUROLOGY IP STROKE CONSULT  SPEECH LANGUAGE PATH ADULT IP CONSULT  PHARMACY IP  CONSULT  PHARMACY IP CONSULT  PHARMACY IP CONSULT  PHYSICAL THERAPY ADULT IP CONSULT  OCCUPATIONAL THERAPY ADULT IP CONSULT  REHAB ADMISSIONS LIAISON IP CONSULT  CARE MANAGEMENT / SOCIAL WORK IP CONSULT  SURGERY GENERAL IP CONSULT  HEMATOLOGY & ONCOLOGY IP CONSULT  PHYSICAL THERAPY ADULT IP CONSULT  OCCUPATIONAL THERAPY ADULT IP CONSULT  SPEECH LANGUAGE PATH ADULT IP CONSULT  SMOKING CESSATION PROGRAM IP CONSULT    Immunizations given this encounter       There is no immunization history on file for this patient.        Anticoagulation Information      Recent INR results:   Recent Labs   Lab 08/30/22  1438   INR 1.00     Warfarin doses (if applicable) or name of other anticoagulant:       SIGNIFICANT IMAGING FINDINGS     Results for orders placed or performed during the hospital encounter of 08/30/22   CTA Head Neck with Contrast    Impression    IMPRESSION:    HEAD CT:  1. No acute intracranial abnormality.    HEAD CTA:  1. Variant anatomy, otherwise unremarkable intracranial vasculature.    NECK CTA:  1. 30% stenosis of the right ICA origin.    - - - - - - - - - - - - - - - - - - - - - - - - - - - - - - - - - - - - - - - - - - - - - - - - - - - - - - - - - - - - - - - - - - - - - - - - - - - -   The results above were discussed with Dr. Beltre on 8/30/2022 2:39 PM by Dr. Jabari Sams.  - - - - - - - - - - - - - - - - - - - - - - - - - - - - - - - - - - - - - - - - - - - - - - - - - - - - - - - - - - - - - - - - - - - - - - - - - - - -    XR Chest Port 1 View    Impression    IMPRESSION: Lungs are clear. Heart and pulmonary vascularity are normal. No signs of acute disease.   MR Brain w/o & w Contrast    Impression    IMPRESSION:  1.  Cluster of small recent ischemic infarcts involving the deep white matter of the right frontal lobe and right basal ganglia.  2.  No other recent infarcts.  3.  Age-related changes.   CT Head w/o Contrast    Impression    IMPRESSION:  1.  Redemonstrated acute to subacute infarction  right basal ganglia and corona radiata. No acute hemorrhage.  2.  Moderate to advanced chronic microvascular ischemic changes.       SIGNIFICANT LABORATORY FINDINGS     Most Recent 3 BMP's:Recent Labs   Lab Test 09/05/22  0755 09/05/22  0700 09/02/22  2111 09/02/22  0846 09/02/22  0739 08/31/22  1140 08/31/22  0557   NA  --  145  --   --  137  --  140   POTASSIUM  --  3.6  --   --  3.4*  --  3.3*   CHLORIDE  --  111*  --   --  105  --  104   CO2  --  24  --   --  21*  --  25   BUN  --  21  --   --  17  --  11   CR  --  0.74  --   --  0.77  --  0.94   ANIONGAP  --  10  --   --  11  --  11   ERNIE  --  9.1  --   --  9.0  --  9.0   * 104 112*   < > 90   < > 97    < > = values in this interval not displayed.           Discharge Orders        Adult Oncology/Hematology  Referral      General info for SNF    Length of Stay Estimate: Short Term Care: Estimated # of Days <30  Condition at Discharge: Improving  Level of care:skilled   Rehabilitation Potential: Excellent  Admission H&P remains valid and up-to-date: Yes  Recent Chemotherapy: N/A  Use Nursing Home Standing Orders: Yes     Mantoux instructions    Give two-step Mantoux (PPD) Per Facility Policy Yes     Activity - Up with nursing assistance     Reason for your hospital stay    You were admitted with a stroke.  You were also found to have a breast mass which is being worked up for cancer and you will need follow-up with oncology     Follow Up and recommended labs and tests    1.  Follow-up with facility MD  2.  Follow-up with neurology in 6 to 8 weeks  3.  Follow-up with oncology     Full Code     Physical Therapy Adult Consult    Evaluate and treat as clinically indicated.    Reason:  Strengthening     Occupational Therapy Adult Consult    Evaluate and treat as clinically indicated.    Reason:  ADLs     Speech Language Path Adult Consult    Evaluate and treat as clinically indicated.    Reason: CVA     Contact and Droplet Isolation    COVID  precautions.  Diagnosed 08/30     Advance Diet as Tolerated    Follow this diet upon discharge: Orders Placed This Encounter      Room Service      Regular Diet Adult Liquidized/Moderately Thick (level 3)         Examination   Physical Exam   Temp:  [97.9  F (36.6  C)-98.9  F (37.2  C)] 98.9  F (37.2  C)  Pulse:  [81-93] 88  Resp:  [16-18] 18  BP: (128-172)/(60-82) 153/77  SpO2:  [93 %-96 %] 93 %  Wt Readings from Last 1 Encounters:   09/03/22 74.2 kg (163 lb 8 oz)       General: No apparent distress.  She continues to deny any COVID symptoms including cough or wheezing or shortness of breath nasal congestion or sinus pressure or sore throat or body aches.   Neurologic: Speech much more clear today      Please see EMR for more detailed significant labs, imaging, consultant notes etc.    I, Blaine Rios MD, personally saw the patient today and spent greater than 30 minutes discharging this patient.    Blaine Rios MD  [facility]    CC:System, Provider Not In

## 2022-09-05 NOTE — PLAN OF CARE
Problem: Cognitive Impairment (Stroke, Ischemic/Transient Ischemic Attack)  Goal: Optimal Cognitive Function  9/5/2022 0516 by Shanae Gonzales RN  Outcome: Ongoing, Progressing  9/4/2022 2223 by Shanae Gonzales RN  Outcome: Ongoing, Progressing  9/4/2022 2218 by Shanae Gonzales RN  Outcome: Ongoing, Progressing     Problem: Functional Ability Impaired (Stroke, Ischemic/Transient Ischemic Attack)  Goal: Optimal Functional Ability  9/5/2022 0516 by Shanae Gonzales RN  Outcome: Ongoing, Progressing  9/4/2022 2223 by Shanae Gonzales RN  Outcome: Ongoing, Progressing  9/4/2022 2218 by Shanae Gonzales RN  Outcome: Ongoing, Progressing  Intervention: Optimize Functional Ability  Recent Flowsheet Documentation  Taken 9/5/2022 0430 by Shanae Gonzales RN  Activity Management: activity adjusted per tolerance  Taken 9/5/2022 0050 by Shanae Gonzales RN  Activity Management: activity adjusted per tolerance  Taken 9/4/2022 2100 by Shanae Gonzales RN  Activity Management: activity adjusted per tolerance  Taken 9/4/2022 1700 by Shanae Gonzales RN  Activity Management: activity adjusted per tolerance     Problem: Skin Injury Risk Increased  Goal: Skin Health and Integrity  9/5/2022 0516 by Shanae Gonzales RN  Outcome: Ongoing, Progressing  9/4/2022 2223 by Shanae Gonzales RN  Outcome: Ongoing, Progressing  9/4/2022 2218 by Shanae Gonzales RN  Outcome: Ongoing, Progressing  Intervention: Optimize Skin Protection  Recent Flowsheet Documentation  Taken 9/5/2022 0430 by Shanae Gonzales RN  Head of Bed (HOB) Positioning: HOB at 20-30 degrees  Taken 9/5/2022 0050 by Shanae Gonzales RN  Head of Bed (HOB) Positioning: HOB at 20 degrees   Goal Outcome Evaluation:      Pt had uneventful night. Neuro checks with NIH of 11 unchanged from previous shift. VSS. No c/o respiratory distress.  Denies pain or discomfort. Left chest  biopsy  dressing remains dry and intact. Will cont to monitor.

## 2022-09-05 NOTE — PLAN OF CARE
Problem: Plan of Care - These are the overarching goals to be used throughout the patient stay.    Goal: Plan of Care Review/Shift Note  Description: The Plan of Care Review/Shift note should be completed every shift.  The Outcome Evaluation is a brief statement about your assessment that the patient is improving, declining, or no change.  This information will be displayed automatically on your shift note.  Outcome: Adequate for Care Transition   Goal Outcome Evaluation:      Patient is alert  and oriented  x4, she has some word finding difficulty but able to make  her needs known. She denied of any pain. NIH at 11, neuro-see  flow sheet. She d/c'd to TCU at 10 am. All belonging sent with patient.

## 2022-09-05 NOTE — PLAN OF CARE
FOCUS/GOAL  Bowel management, Bladder management, Medication management, Pain management, Medical management, Mobility, Skin integrity, and Safety management    ASSESSMENT, INTERVENTIONS AND CONTINUING PLAN FOR GOAL:    Pt is alert and oriented x 4, forgetful at times. Vital signs stable, BP runs high. Denied pain, nausea and vomiting, numbness or tingling, shortness of breath. Breast mass bleeds, abd pad applied, waiting for WOC assessment. Soft mass present below right elbow as well which pt stated she had it for many years, left note for provider to assess. Mild bruises present all over body. Open area in coccyx also waiting for WOC assessment. Alarms on, side rails up x 3 for safety. Call light within reach, able to make needs known. Mixed incontinence, offering timed toileting, pt requested purewick during night. Reg diet, moderately thickened liquids. Covid precautions maintained. Will continue with plan of care.

## 2022-09-05 NOTE — PLAN OF CARE
Physical Therapy Discharge Summary    Reason for therapy discharge:    Discharged to acute rehabilitation facility.    Progress towards therapy goal(s). [facility] for goal details.  Goals partially met.  Barriers to achieving goals:   discharge from facility.    Therapy recommendation(s):    Continued therapy is recommended.  Rationale/Recommendations:  Goals #1 and 4 met. Continue PT at acute rehab as pt is progressing towards other goals..

## 2022-09-05 NOTE — PLAN OF CARE
Goal Outcome Evaluation:    Plan of Care Reviewed With: patient     Overall Patient Progress: no change    Outcome Evaluation: Pt arrived at ARU today. Remains on special precautions. Reports no pain at this time. BP is elevated in 170s, provider made aware (permissive HTN and BP med dosage increased).

## 2022-09-05 NOTE — PHARMACY-ADMISSION MEDICATION HISTORY
Admission medication history completed at [facility]. Please see Pharmacy - Admission Medication History note from 8/30/2022. Patient not taking any medications prior to admission.    Louann Kerr, HectorD, BCPS

## 2022-09-05 NOTE — PLAN OF CARE
Problem: Plan of Care - These are the overarching goals to be used throughout the patient stay.    Goal: Optimal Comfort and Wellbeing  Outcome: Ongoing, Progressing  Intervention: Provide Person-Centered Care  Recent Flowsheet Documentation  Taken 9/4/2022 2100 by Shanae Gonzales, RN  Trust Relationship/Rapport: care explained  Taken 9/4/2022 1700 by Shanae Gonzales, RN  Trust Relationship/Rapport: care explained     Problem: Cognitive Impairment (Stroke, Ischemic/Transient Ischemic Attack)  Goal: Optimal Cognitive Function  Outcome: Ongoing, Progressing   Goal Outcome Evaluation:  Pt is alert oriented x4. VS WNL's for pt. Denies pain. Pt has infreq nonproductive cough. No resp distress noted. Lungs clear, but diminished in the bases. Stable neuro status with NIH of 11. Appears comfortable in bed now. Cont with plan of care.

## 2022-09-05 NOTE — H&P
[facility]   Acute Rehabilitation Unit  Admission History and Physical    CHIEF COMPLAINT   Ischemic Stroke: small cluster of recent infarcts in the right frontal lobe and right basal ganglia      HISTORY OF PRESENT ILLNESS  Stephani Garcia is a 67 year old left hand dominant female with PMHx Hypertension, Hyperlipidemia, vertigo, GILDA (not using CPAP). She presented to [facility] 8/30/22 with stroke symptoms of left-sided partial hemiparesis and mild dysarthria/expressive aphasia on morning of, onset was the day prior. TPA/ thrombectomy not done due to unknown last normal, and lack of large vessel occlusion only 30% stenosis of right ICA on CTA. Brain MRI showed ischemic stroke with a small cluster of recent infarcts in the right frontal lobe and right basal ganglia. She also tested positive for asymptomatic COVID infection on admission. Had a recent breast mass biopsy 9/2, with pathology results pending but very concerning for cancer, this was following a notable suspicious breast mass which has grown quickly and has begun to bleed and is causing skin compromise . She was a very poor candidate for surgery because of the size of the mass and because of her recent stroke.    During acute hospitalization, patient was seen and evaluated by PT and OT, who collectively recommended that patient would benefit from ongoing therapies in the acute inpatient rehabilitation setting.      In review of the therapy notes:   She was participating in PT/OT/SLP needs and making progress. VFSS was completed 9/2 and pt demostrated silent aspiration of mildly thick liquids so placed on regular diet with moderately thick liquids. Demonstrated higher level word finding difficulty in conversation with difficulty expressing complex thoughts; Cognition needs further assessment for safety. She performed supine<>sit with max A x 2 and sit scoot with mod A x 2 with assist for trunk alignment and  "assist with the task. She tends to lean to the L with all sitting and standing tasks. Completed STS with Max Ax2 using arm-in-arm technique, able to tolerate standing for ~1 min. [facility] d/t impaired balance and weakness. She required Mod A for balance following standing task d/t fatigue. Also required Max Ax2 for bed mobility EOB>supine. She is dependent for donning socks and min A using washcloth while reclined in bed to complete facial hygiene.     Upon arrival to the rehab unit:  She endorses fatigue with the transfer but is otherwise ok. She says she would like to work on strengthening her L. Side, but understands it might take some time. In the mean time her goal is to use her R. Side as much as possible. She has no other symptoms pertaining to her covid infection. She did have some recent loose BM  but only once.     She denies current headache, N/V, changes in vision, CP, SOB, abdominal discomfort, dysuria or other LUTS symptoms.    PAST MEDICAL HISTORY   Reviewed and updated in Epic.  Past Medical History:   Diagnosis Date     Hyperlipidemia 2009     Hypertension 2009     GILDA (obstructive sleep apnea) 2011    Formatting of this note might be different from the original. Split-Night Polysomnogram Interpretation  Date of read:  2011  Estimated Body mass index is 26.94 kg/(m^2) as calculated from the following:   Height as of 11: 5' 7\"(1.702 m).   Weight as of 11: 172 lb(78.019 kg). Total Shandon Score: (not recorded) Neck Circumference (in inches): 13   Baseline: This study was performed in       SURGICAL HISTORY  Reviewed and updated in Epic.  Past Surgical History:   Procedure Laterality Date      SECTION         SOCIAL HISTORY  Reviewed and updated in Epic.  Marital Status:   Living situation:   People in Home: spouse (son is living with them for a few months)  Current Living Arrangements: house (2 level " [facility])  Home Accessibility: stairs to enter home; stairs within home              Number of Stairs, Main Entrance: other (see comments) (1.5 steps to enter)  Stair Railings, Main Entrance: railing on left side (ascending)  Number of Stairs, Within Home, Primary: greater than 10 stairs  Stair Railings, Within Home, Primary: railing on left side (ascending)  Living Environment Comments: Pt said she could stay on one level for ADLs but would rather not.  Family support: Spouse  Vocational History: Worked for the [employer] as a .  Tobacco use: No prior hx  Alcohol use: Occasional with x1 glass per week  Illicit drug use: None  Social History     Socioeconomic History     Marital status:      Spouse name: Not on file     Number of children: Not on file     Years of education: Not on file     Highest education level: Not on file   Occupational History     Not on file   Tobacco Use     Smoking status: Never Smoker     Smokeless tobacco: Not on file   Substance and Sexual Activity     Alcohol use: Yes     Alcohol/week: 1.0 standard drink     Types: 1 Glasses of wine per week     Comment: occasional     Drug use: Never     Sexual activity: Not on file   Other Topics Concern     Not on file   Social History Narrative     Not on file     Social Determinants of Health     Financial Resource Strain: Not on file   Food Insecurity: Not on file   Transportation Needs: Not on file   Physical Activity: Not on file   Stress: Not on file   Social Connections: Not on file   Intimate Partner Violence: Not on file   Housing Stability: Not on file       FAMILY HISTORY  Reviewed and updated in Epic.  Family History   Problem Relation Age of Onset     Hyperlipidemia Mother      Lung Cancer Mother      Cataracts Father      Lung Cancer Father      PRIOR FUNCTIONAL HISTORY   Pt was independent with all ADLs/IADLs, transfers, mobility and gait.      MEDICATIONS  Scheduled meds  Medications Prior to Admission   Medication  "Sig Dispense Refill Last Dose     aspirin (ASA) 81 MG EC tablet Take 1 tablet (81 mg) by mouth daily   9/5/2022 at 08:39     atorvastatin (LIPITOR) 80 MG tablet Take 1 tablet (80 mg) by mouth every evening   9/4/2022 at 21:16     clopidogrel (PLAVIX) 75 MG tablet Take 1 tablet (75 mg) by mouth daily   9/5/2022 at 08:39     lisinopril (ZESTRIL) 20 MG tablet Take 1 tablet (20 mg) by mouth At Bedtime 30 tablet 0 9/4/2022 at 21:16       ALLERGIES     Allergies   Allergen Reactions     Sulfa Drugs Headache     Tetracyclines Hives     Occurred many years ago.          REVIEW OF SYSTEMS  A 10 point ROS was performed and negative unless otherwise noted in HPI.       PHYSICAL EXAM  VITAL SIGNS:  BP (!) 167/89 (BP Location: Right arm)   Pulse 92   Temp (!) 96.3  F (35.7  C) (Oral)   Resp 18   Ht 1.702 m (5' 7\")   Wt 77.7 kg (171 lb 3.2 oz)   SpO2 95%   BMI 26.81 kg/m    BMI:  Estimated body mass index is 26.81 kg/m  as calculated from the following:    Height as of this encounter: 1.702 m (5' 7\").    Weight as of this encounter: 77.7 kg (171 lb 3.2 oz).     General: Laying in bed. Shifts to the L. Constantly. No acute distress  HEENT: PERRL, EOMI with baseline L. Eye with medial gaze pareses. Moist mucous membranes, no oral thrush. Very small incomplete closure on the left side   Pulmonary: Breathing comfortably on room air, clear to auscultation bilaterally  Cardiovascular: S1 and S2, Regular rate and rhythm, no M/R/G  Abdominal: Non-tender, non-distended, bowel sounds present in all four quadrants   Extremities: warm, well perfused, no edema in bilateral lower extremities, no tenderness in calves.  MSK/neuro:   Mental Status:  alert and oriented x3    Cranial Nerves:   1. 2nd CN: Pupils equal, round, reactive to light and accomodation. and visual fields intact to confrontation.   2. 3rd,4th,6th CN:  EOMI (see above), appropriate pupillary responses  3. 5th CN: facial sensation intact   4. 7th CN: left facial " droop  5. 8th CN: functional hearing bilaterally  6. 9th, 10th CN: palate elevates symmetrically   7. 11th CN: sternocleidomastoids and trapezii strong   8. 12th CN: tongue midline and without fasciculations     Sensory: Normal to light touch in bilateral upper and lower extremities.   Strength:    SF  EF  EE  WE  G  I  HF  KE  DF  EHL  PF   R  5/5 5/5 5/5 5/5 5/5 5/5 5/5 5/5 5/5 5/5 5/5  L  2/5  1/5 0/5 0/5 0/5 0/5 2/5 2/5 0/5 0/5 0/5    Reflexes: LUE and LLE are present and brisk    Watson's test: positive LUE.   Babinski reflex: up going LLE.   Tone per modified Tracey Scale: Normal tone   Abnormal movements: None    Coordination: Deferred   Speech: dysarthric. Normal volume   Cognition: intact   Gait: deferred  Skin: L. Breast with       LABS  Pertinent labs   Lab Results   Component Value Date    WBC 5.6 08/31/2022     Lab Results   Component Value Date    RBC 5.19 08/31/2022     Lab Results   Component Value Date    HGB 15.1 09/02/2022     Lab Results   Component Value Date    HCT 45.4 08/31/2022     Lab Results   Component Value Date    MCV 88 08/31/2022     Lab Results   Component Value Date    MCH 28.9 08/31/2022     Lab Results   Component Value Date    MCHC 33.0 08/31/2022     Lab Results   Component Value Date    RDW 13.1 08/31/2022     Lab Results   Component Value Date     09/05/2022     Last Comprehensive Metabolic Panel:  Sodium   Date Value Ref Range Status   09/05/2022 145 136 - 145 mmol/L Final     Potassium   Date Value Ref Range Status   09/05/2022 3.6 3.5 - 5.0 mmol/L Final     Chloride   Date Value Ref Range Status   09/05/2022 111 (H) 98 - 107 mmol/L Final     Carbon Dioxide (CO2)   Date Value Ref Range Status   09/05/2022 24 22 - 31 mmol/L Final     Anion Gap   Date Value Ref Range Status   09/05/2022 10 5 - 18 mmol/L Final     Glucose   Date Value Ref Range Status   09/05/2022 104 70 - 125 mg/dL Final     GLUCOSE BY METER POCT   Date Value Ref Range Status   09/05/2022 117 (H) 70  - 99 mg/dL Final     Urea Nitrogen   Date Value Ref Range Status   09/05/2022 21 8 - 22 mg/dL Final     Creatinine   Date Value Ref Range Status   09/05/2022 0.74 0.60 - 1.10 mg/dL Final     GFR Estimate   Date Value Ref Range Status   09/05/2022 88 >60 mL/min/1.73m2 Final     Comment:     Effective December 21, 2021 eGFRcr in adults is calculated using the 2021 CKD-EPI creatinine equation which includes age and gender (Levi et al., NE, DOI: 10.1056/DEFYtz3417332)     Calcium   Date Value Ref Range Status   09/05/2022 9.1 8.5 - 10.5 mg/dL Final     Bilirubin Total   Date Value Ref Range Status   08/30/2022 0.5 0.0 - 1.0 mg/dL Final     Alkaline Phosphatase   Date Value Ref Range Status   08/30/2022 109 45 - 120 U/L Final     ALT   Date Value Ref Range Status   08/30/2022 24 0 - 45 U/L Final     AST   Date Value Ref Range Status   08/30/2022 44 (H) 0 - 40 U/L Final       IMPRESSION/PLAN:  Stephani Garcia is a 67 year old left hand dominant female with PMHx Hypertension, Hyperlipidemia, vertigo, GILDA (not using CPAP). Admitted for acute ischemic infarcts with right frontal lobe and right basal ganglia involvement. Has functional deficits of weak L. Shoulder shrug, L. Hemiparesis, dysphagia, dysarthria, possbile neurogenic bowel/bladder and cognitive deficits. She also has possible L. Breast CA pending pathology results. She is admitted to undergo therapies with PT/OT/SLP.    Admission to acute inpatient rehab: 9/5/22  Impairment group code: 01.1  Stroke Ischemic  (L) Body Involvement (R) Brain: small cluster of recent infarcts in the right frontal lobe and right basal ganglia.          1. PT, OT and SLP 60 minutes of each on a daily basis, in addition to rehab nursing and close management of physiatrist.      2. Impairment of ADL's: Impairment in strength, endurance, and ROM resulting in functional limitations in patient's ability to perform ADLs.    3. Impairment of mobility:  Impairment in strength, endurance,  "and ROM resulting in functional limitations in patient's ability to perform functional mobility .    4. Impairment of cognition/language/swallow:  Pending SLP evaluation for dysphagia and dysarthria.    5. Medical Conditions    # Acute ischemic stroke   # Recent infarcts in the right frontal lobe and right basal ganglia  Noted left-sided partial hemiparesis and mild dysarthria/expressive aphasia at presentation 8/30. MRI confirmed  Acute ischemic stroke, and TPA/ thrombectomy 2/2 unknown last known normal. CTA showed no large vessel occlusion, with only 30% of ICA occlusion. Echo was normal EF 65% on 8/31. No Afib noted with telemetry.  at baseline. BP goal is <140/90 at rest, anticipate increase with therapies.   - Continue Plavix 75 mg and aspirin 81 mg for 90 days then switch to ASA 325mg mono therapy  - Continue Atorvastatin 80 mg at bedtime.   - Needs PT/OT/SLP evaluation       # Dysphagia    Noted during Video swallow study on 9/2/22: \"Patient had direct, silent aspiration with mildly thick liquid on first trial and deep penetration with mildly thick liquids on subsequent trials. No aspiration or penetration with moderately thick liquids.   - Continue dysphagia diet with moderately thick fluids  - SLP to evaluate     # Left Breast mass  Has had a FNA 9/2 by surgery. There is a high suspicion for breast CA. Surgery rec'd neoadjuvent treatment with hopes of shrinking tumor prior to surgery. Unable to surgically operated due to stroke at the moment.   - Needs referral to oncology as outpatient.     - Follow up path results   - WOCN consult to assist with wound care     # Hyperlipidemia    at baseline.  - statin high-dose as above     # Hypertension  Was allowed permissive HTN, first 24 hours but started to bring it down slowly. Goal to keep <140/90 at rest.  - Continue lisinopril 20 mg daily  - Monitor BP anticipate some increases during the therapy sessions.       #GILDA (obstructive sleep " apnea):  Patient does not use CPAP at baseline. May need repeat sleep study and eval by sleep medicine team.      # Asymptomatic COVID 19 Infection    Positive Covid test on 8/3 with preceding cold symptoms for 3 days prior. Currently she continues remain asymptomatic from COVID symptoms on 9/5. She has no cough, wheezing, SOB, fever, chills, body aches or any type of cold symptoms.  She has completed remdesivir x 3 days.  - Could possibly come off quarantine 9/9 if Covid test is neg.    # Neurogenic Bladder  Using diaper 2/2 incontinence. Discussed with her on options to do timed voids during the stay.  - Consider doing timed voids   - Check PVR's initially for any retention.      # Bowel   Noted mild loose stools 9/4. Has no had BM as of this morning.  - Start PRN Bowel meds with Senna and Miralax. Will observe constipation.      6. Adjustment to disability:  Clinical psychology to eval and treat as needed.  7. FEN: Regular Diet Adult Liquidized/Moderately Thick (level 3)  8. Bowel: PRN Senna and Miralax  9. Bladder: Pending PVR review, mgith need a scheduled bladder program  10. DVT Prophylaxis: Heparin 5000 units for DVT ppx. Plavix 75 mg daily and ASA 81 mg 90 days then ASA alone.   11. GI Prophylaxis: Pantoprazole 40 mg   12. Code: Full  13. Disposition: Home  14. ELOS:  21 days.  15. Rehab prognosis:  Fair  16. Follow up Appointments on Discharge:   - PCP follow up 7-14 days post discharge  - Follow-up with neurology in 6 to 8 weeks  - Referral for oncology as an outpatient    Seen and discussed with Dr. Jerilyn Chester, PM&R staff physician     Estrada Kevin MD  Resident Physician- PGY-2  PM&R Department      Physician Attestation   I saw this patient with the resident and agree with the resident/fellow's findings and plan of care as documented in the note.      I personally reviewed vital signs, medications, labs, imaging and notes in the chart.    Ray findings:   Stephani Garcia is a 66 yo female who was  admitted on 8/30 with left hemiparesis, dysarthria and aphasia due to small cluster of recent infarcts in the right frontal lobe and right basal ganglia. Her PMH is significant for HTN, HLD, and non-treated GILDA. She has had a fast growing mas in her left breast for the past 3 months which is highly concerning for cancer; biopsy was done and path results are pending.      Comorbid medical conditions being managed:   --managing stroke risk factors HTN and HLD  --encourage using CPAP and call RT for trial- needs outpatient referral to sleep medicine clinic if agreeable   --wound care at the sites of L breast biopsy   --pain management   --COVID + on special precautions   --neurogenic bowel and bladder   --dysphagia     She was I with all aspects of her life prior to admission; currently requires mod to max A for sitting balance, transfers and most ADLs. Functional deficits as above. Anticipate improvement to mod I level with basics vs A of 1 for ADLs and transfers. Might remain WC based given the severity of her deficits.     Will benefit from intensive rehabilitation including 60 minutes each of PT, OT and SLP, rehabilitation nursing and close management by physiatry team. ELOS is 3 weeks. Rehab prognosis is good but medical prognosis is guarded.     Jerilyn Chester MD  Date of Service (when I saw the patient): 09/05/22

## 2022-09-06 ENCOUNTER — PATIENT OUTREACH (OUTPATIENT)
Dept: ONCOLOGY | Facility: CLINIC | Age: 67
End: 2022-09-06

## 2022-09-06 ENCOUNTER — APPOINTMENT (OUTPATIENT)
Dept: OCCUPATIONAL THERAPY | Facility: CLINIC | Age: 67
DRG: 056 | End: 2022-09-06
Attending: PHYSICAL MEDICINE & REHABILITATION
Payer: COMMERCIAL

## 2022-09-06 ENCOUNTER — APPOINTMENT (OUTPATIENT)
Dept: SPEECH THERAPY | Facility: CLINIC | Age: 67
DRG: 056 | End: 2022-09-06
Attending: PHYSICAL MEDICINE & REHABILITATION
Payer: COMMERCIAL

## 2022-09-06 ENCOUNTER — APPOINTMENT (OUTPATIENT)
Dept: PHYSICAL THERAPY | Facility: CLINIC | Age: 67
DRG: 056 | End: 2022-09-06
Attending: PHYSICAL MEDICINE & REHABILITATION
Payer: COMMERCIAL

## 2022-09-06 LAB
BASOPHILS # BLD AUTO: 0 10E3/UL (ref 0–0.2)
BASOPHILS NFR BLD AUTO: 0 %
EOSINOPHIL # BLD AUTO: 0.2 10E3/UL (ref 0–0.7)
EOSINOPHIL NFR BLD AUTO: 2 %
ERYTHROCYTE [DISTWIDTH] IN BLOOD BY AUTOMATED COUNT: 13 % (ref 10–15)
HCT VFR BLD AUTO: 41.5 % (ref 35–47)
HGB BLD-MCNC: 13.7 G/DL (ref 11.7–15.7)
HOLD SPECIMEN: NORMAL
IMM GRANULOCYTES # BLD: 0.1 10E3/UL
IMM GRANULOCYTES NFR BLD: 1 %
LYMPHOCYTES # BLD AUTO: 1.4 10E3/UL (ref 0.8–5.3)
LYMPHOCYTES NFR BLD AUTO: 17 %
MCH RBC QN AUTO: 28.5 PG (ref 26.5–33)
MCHC RBC AUTO-ENTMCNC: 33 G/DL (ref 31.5–36.5)
MCV RBC AUTO: 86 FL (ref 78–100)
MONOCYTES # BLD AUTO: 0.7 10E3/UL (ref 0–1.3)
MONOCYTES NFR BLD AUTO: 8 %
NEUTROPHILS # BLD AUTO: 6.3 10E3/UL (ref 1.6–8.3)
NEUTROPHILS NFR BLD AUTO: 72 %
NRBC # BLD AUTO: 0 10E3/UL
NRBC BLD AUTO-RTO: 0 /100
PLATELET # BLD AUTO: 282 10E3/UL (ref 150–450)
RBC # BLD AUTO: 4.81 10E6/UL (ref 3.8–5.2)
WBC # BLD AUTO: 8.6 10E3/UL (ref 4–11)

## 2022-09-06 PROCEDURE — 97530 THERAPEUTIC ACTIVITIES: CPT | Mod: GP | Performed by: PHYSICAL THERAPIST

## 2022-09-06 PROCEDURE — 999N000125 HC STATISTIC PATIENT MED CONFERENCE < 30 MIN

## 2022-09-06 PROCEDURE — 97530 THERAPEUTIC ACTIVITIES: CPT | Mod: GO

## 2022-09-06 PROCEDURE — 92610 EVALUATE SWALLOWING FUNCTION: CPT | Mod: GN

## 2022-09-06 PROCEDURE — 85025 COMPLETE CBC W/AUTO DIFF WBC: CPT

## 2022-09-06 PROCEDURE — 97167 OT EVAL HIGH COMPLEX 60 MIN: CPT | Mod: GO

## 2022-09-06 PROCEDURE — 250N000013 HC RX MED GY IP 250 OP 250 PS 637

## 2022-09-06 PROCEDURE — 97162 PT EVAL MOD COMPLEX 30 MIN: CPT | Mod: GP | Performed by: PHYSICAL THERAPIST

## 2022-09-06 PROCEDURE — 128N000003 HC R&B REHAB

## 2022-09-06 PROCEDURE — 36415 COLL VENOUS BLD VENIPUNCTURE: CPT

## 2022-09-06 PROCEDURE — 250N000011 HC RX IP 250 OP 636

## 2022-09-06 PROCEDURE — 96125 COGNITIVE TEST BY HC PRO: CPT | Mod: GN

## 2022-09-06 PROCEDURE — 99233 SBSQ HOSP IP/OBS HIGH 50: CPT | Mod: CS | Performed by: PHYSICAL MEDICINE & REHABILITATION

## 2022-09-06 PROCEDURE — 999N000150 HC STATISTIC PT MED CONFERENCE < 30 MIN: Performed by: PHYSICAL THERAPIST

## 2022-09-06 PROCEDURE — G0463 HOSPITAL OUTPT CLINIC VISIT: HCPCS

## 2022-09-06 RX ORDER — HYDRALAZINE HYDROCHLORIDE 10 MG/1
10 TABLET, FILM COATED ORAL EVERY 8 HOURS PRN
Status: DISCONTINUED | OUTPATIENT
Start: 2022-09-06 | End: 2022-09-07

## 2022-09-06 RX ORDER — AMLODIPINE BESYLATE 5 MG/1
5 TABLET ORAL DAILY
Status: DISCONTINUED | OUTPATIENT
Start: 2022-09-06 | End: 2022-09-14

## 2022-09-06 RX ORDER — LANOLIN ALCOHOL/MO/W.PET/CERES
3 CREAM (GRAM) TOPICAL AT BEDTIME
Status: DISCONTINUED | OUTPATIENT
Start: 2022-09-06 | End: 2022-09-08

## 2022-09-06 RX ADMIN — ASPIRIN 81 MG: 81 TABLET, COATED ORAL at 08:49

## 2022-09-06 RX ADMIN — HEPARIN SODIUM 5000 UNITS: 5000 INJECTION, SOLUTION INTRAVENOUS; SUBCUTANEOUS at 13:41

## 2022-09-06 RX ADMIN — AMLODIPINE BESYLATE 5 MG: 5 TABLET ORAL at 06:46

## 2022-09-06 RX ADMIN — ATORVASTATIN CALCIUM 80 MG: 80 TABLET, FILM COATED ORAL at 21:56

## 2022-09-06 RX ADMIN — LISINOPRIL 20 MG: 20 TABLET ORAL at 21:56

## 2022-09-06 RX ADMIN — PANTOPRAZOLE SODIUM 40 MG: 40 TABLET, DELAYED RELEASE ORAL at 06:44

## 2022-09-06 RX ADMIN — MELATONIN TAB 3 MG 3 MG: 3 TAB at 21:56

## 2022-09-06 RX ADMIN — CLOPIDOGREL BISULFATE 75 MG: 75 TABLET, FILM COATED ORAL at 08:49

## 2022-09-06 RX ADMIN — HEPARIN SODIUM 5000 UNITS: 5000 INJECTION, SOLUTION INTRAVENOUS; SUBCUTANEOUS at 01:43

## 2022-09-06 RX ADMIN — HEPARIN SODIUM 5000 UNITS: 5000 INJECTION, SOLUTION INTRAVENOUS; SUBCUTANEOUS at 21:56

## 2022-09-06 ASSESSMENT — ACTIVITIES OF DAILY LIVING (ADL)
ADLS_ACUITY_SCORE: 48
IADLS,_PREVIOUS_FUNCTIONAL_LEVEL: INDEPENDENT
BADLS,_PREVIOUS_FUNCTIONAL_LEVEL: INDEPENDENT
ADLS_ACUITY_SCORE: 48
IADLS,_PREVIOUS_FUNCTIONAL_LEVEL: INDEPENDENT
ADLS_ACUITY_SCORE: 48
BADLS,_PREVIOUS_FUNCTIONAL_LEVEL: INDEPENDENT
ADLS_ACUITY_SCORE: 48

## 2022-09-06 NOTE — PROGRESS NOTES
"   09/06/22 0900   Quick Adds   Type of Visit Initial PT Evaluation       Present no   Language English   Living Environment   People in Home spouse   Current Living Arrangements house   Home Accessibility stairs to enter home;stairs within home   Number of Stairs, Main Entrance 1   Stair Railings, Main Entrance none   Transportation Anticipated family or friend will provide   Living Environment Comments PT: Pt lives with spouse in a townhouse in [facility], 1 small DAVE without rails. Flight of stairs to the basement, but all needs able to met on the main floor. PTA, pt was IND with all functional mobility, ADLs, IADLs without a device, including driving and working (works for the [employer]).   Self-Care   Usual Activity Tolerance good   Current Activity Tolerance fair   Regular Exercise No   Equipment Currently Used at Home none   Fall history within last six months no   Post-Acute Assessment Only   Post-Acute Functional Assessment See IP Rehab Daily Documentation Flowsheet for Functional Mobility/ADL Assessment   Previous Level of Function/Home Environm   Bathing/Grooming, Premorbid Functional Level independent   Dressing, Premorbid Functional Level independent   Eating/Feeding, Premorbid Functional Level independent   Toileting, Premorbid Functional Level independent   BADLs, Premorbid Functional Level independent   IADLs, Premorbid Functional Level independent   Bed Mobility, Premorbid Functional Level independent   Transfers, Premorbid Functional Level independent   Household Ambulation, Premorbid Functional Level independent   Stairs, Premorbid Functional Level independent   Community Ambulation, Premorbid Functional Level independent   Functional Cognition, Premorbid Functional Level WNL   General Information   Onset of Illness/Injury or Date of Surgery 08/30/22   Referring Physician Andrew Marcos, DO   Patient/Family Therapy Goals Statement (PT) \"To get left side stronger.\"   Pertinent " "History of Current Problem (include personal factors and/or comorbidities that impact the POC) PER EMR: \"Stephani Garcia is a 67 year old left hand dominant female with PMHx Hypertension, Hyperlipidemia, vertigo, GILDA (not using CPAP). Admitted for acute ischemic infarcts with right frontal lobe and right basal ganglia involvement. Has functional deficits of weak L. Shoulder shrug, L. Hemiparesis, dysphagia, dysarthria, possbile neurogenic bowel/bladder and cognitive deficits. She also has possible L. Breast CA pending pathology results. She is admitted to undergo therapies with PT/OT/SLP.\"   Existing Precautions/Restrictions fall;swallowing  (possible breast CA, covid special precautions)   Weight-Bearing Status - LUE full weight-bearing   Weight-Bearing Status - RUE full weight-bearing   Weight-Bearing Status - LLE full weight-bearing   Weight-Bearing Status - RLE full weight-bearing   Cognition   Affect/Mental Status (Cognition) WNL   Orientation Status (Cognition) oriented x 4   Follows Commands (Cognition) follows multi-step commands;75-90% accuracy   Behavioral Issues   (None noted)   Safety Deficit (Cognition) minimal deficit;problem-solving   Memory Deficit (Cognition)   (Defer to OT/SLP)   Cognitive Status Comments Pleasant and cooperative. Does fairly well to follow multi-step commands, but with definite word finding difficulties, and  notes \"I can't think as well as I used to.\"   Pain Assessment   Patient Currently in Pain No   Posture    Posture Forward head position;Protracted shoulders;Kyphosis   Range of Motion (ROM)   Range of Motion ROM is WNL   ROM Comment in bilat LE   Strength (Manual Muscle Testing)   Strength Comments Grossly 1/5 t/o L LE, 4+/5 t/o R LE   Balance   Balance Comments Fair static, dynamic seated balance EOB, requiring intermittent min<>mod A with dynamic mvmts d/t L LOB. PASS 13/36.   Sensory Examination   Sensory Perception Comments Reports mild numbness in L LE compared to the " R, but intact to light touch, monofilament/protective sensation, vibration, and proprioception (5/5 at bilat great toe) in bilat LE.   Muscle Tone   Muscle Tone no deficits were identified   Clinical Impression   Criteria for Skilled Therapeutic Intervention Yes, treatment indicated   PT Diagnosis (PT) impaired force production deficit   Influenced by the following impairments impaired L-sided strength, impaired sitting/standing balance, impaired cognition   Functional limitations due to impairments bed mobility, transfers, ambulation, stairs   Clinical Presentation (PT Evaluation Complexity) Evolving/Changing   Clinical Presentation Rationale DAVE and within home, extent of impairment and functional limitations   Clinical Decision Making (Complexity) moderate complexity   Planned Therapy Interventions (PT) balance training;bed mobility training;E-stim   Anticipated Equipment Needs at Discharge (PT)   (WC, quad cane, L Eunice Neurexa, gait belt)   Risk & Benefits of therapy have been explained evaluation/treatment results reviewed;care plan/treatment goals reviewed;risks/benefits reviewed;current/potential barriers reviewed;participants voiced agreement with care plan;participants included;patient   Clinical Impression Comments Pt presents to PT with significant L hemiparesis with impaired sitting/standing balance and notable cognitive impairments s/p R frontal and basal ganglia CVA 8/30/22. As a result, pt is requiring significantly increased level of assist for functional mobility and at increaed risk for falls above baseline. She should benefit well from skilled PT, with goals for SBA WC-based mobility at discharge. ROWDYOS 3-4 weeks.   Total Evaluation Time   Total Evaluation Time (Minutes) 20   Physical Therapy Goals   PT Frequency Daily   PT Predicted Duration/Target Date for Goal Attainment 09/26/22   PT: Bed Mobility Independent;Supine to/from sit;Rolling;Bridging   PT: Transfers Supervision/stand-by assist;Sit  to/from stand;Bed to/from chair;Assistive device   PT: Gait Supervision/stand-by assist;Quad cane;25 feet   PT: Stairs Supervision/stand-by assist;1 stair;Assistive device   PT: Wheelchair Mobility Caregiver SBA;150 feet   PT: Goal 1 Pt will be able to complete car transfer with quad cane and SBA in order to access home and medical appts.   PT: Goal 2 Pt will be able to complete floor>furniture transfer with min A as part of fall recovery training.   PT: Goal 3 Pt will be IND with HEP for L LE/UE strengthening to help reduce risk of future falls.

## 2022-09-06 NOTE — CONSULTS
[facility] Nurse Inpatient Assessment     Consulted for: left breast and sacrum     Patient History (according to provider note(s):      Stephani Garcia is a 67 year old left hand dominant female with PMHx Hypertension, Hyperlipidemia, vertigo, GILDA (not using CPAP). She presented to [facility] 8/30/22 with stroke symptoms of left-sided partial hemiparesis and mild dysarthria/expressive aphasia on morning of, onset was the day prior. TPA/ thrombectomy not done due to unknown last normal, and lack of large vessel occlusion only 30% stenosis of right ICA on CTA. Brain MRI showed ischemic stroke with a small cluster of recent infarcts in the right frontal lobe and right basal ganglia. She also tested positive for asymptomatic COVID infection on admission. Had a recent breast mass biopsy 9/2, with pathology results pending but very concerning for cancer, this was following a notable suspicious breast mass which has grown quickly and has begun to bleed and is causing skin compromise . She was a very poor candidate for surgery because of the size of the mass and because of her recent stroke.     During acute hospitalization, patient was seen and evaluated by PT and OT, who collectively recommended that patient would benefit from ongoing therapies in the acute inpatient rehabilitation setting.      In review of the therapy notes:   She was participating in PT/OT/SLP needs and making progress. VFSS was completed 9/2 and pt demostrated silent aspiration of mildly thick liquids so placed on regular diet with moderately thick liquids. Demonstrated higher level word finding difficulty in conversation with difficulty expressing complex thoughts; Cognition needs further assessment for safety. She performed supine<>sit with max A x 2 and sit scoot with mod A x 2 with assist for trunk alignment and assist with the task. She tends to lean to the L with all sitting and standing  tasks. Completed STS with Max Ax2 using arm-in-arm technique, able to tolerate standing for ~1 min. Unable to attempt transfer to recliner/[facility] at this time d/t impaired balance and weakness. She required Mod A for balance following standing task d/t fatigue. Also required Max Ax2 for bed mobility EOB>supine. She is dependent for donning socks and min A using washcloth while reclined in bed to complete facial hygiene.      Upon arrival to the rehab unit:  She endorses fatigue with the transfer but is otherwise ok. She says she would like to work on strengthening her L. Side, but understands it might take some time. In the mean time her goal is to use her R. Side as much as possible. She has no other symptoms pertaining to her covid infection. She did have some recent loose BM 9/4 but only once.      She denies current headache, N/V, changes in vision, CP, SOB, abdominal discomfort, dysuria or other LUTS symptoms.    Areas Assessed:      Areas visualized during today's visit: Focused:, Sacrum/coccyx and left chest    Wound location: left breast    Last photo: 9/6  Wound due to: Malignancy  Wound history/plan of care: pt reported having wound for 3 months to WOC and for 1 month to RN. Pt had ABD that was adherent to wound without any non adhesive contact layer in place.   Wound base: 100 % non-granular tissue     Palpation of the wound bed: normal      Drainage: copious     Description of drainage: bloody     Measurements (length x width x depth, in cm): ~3  x 3  x  0.1 cm hard to measure secondary to bleeding.      Tunneling: N/A     Undermining: N/A  Periwound skin: Intact      Color: normal and consistent with surrounding tissue      Temperature: normal   Odor: none  Pain: denies , none  Pain interventions prior to dressing change: N/A  Treatment goal: Simplify plan of care  STATUS: initial assessment  Supplies ordered: supplies stored on unit     Wound location: left intergluteal cleft    Last photo: 9/6  Wound due  to: Unknown Etiology, not consistent with pressure   Wound history/plan of care: mepilex in place on initial assessment   Wound base: 100 % dermis     Palpation of the wound bed: normal      Drainage: none     Description of drainage: none     Measurements (length x width x depth, in cm): 0.2  x 0.2  x  0.1 cm      Tunneling: N/A     Undermining: N/A  Periwound skin: Erythema- blanchable      Color: pink      Temperature: normal   Odor: none  Pain: no grimacing or signs of discomfort, none  Pain interventions prior to dressing change: N/A  Treatment goal: Protection  STATUS: initial assessment  Supplies ordered: supplies stored on unit     Treatment Plan:     Buttock wound(s): Every 3 days  Cleanse with wound cleanser and pat dry. Paint renee wound skin with no sting. Conform mepilex over area.     Left chest wound(s): Daily cleanse with wound cleanser and pat dry. GENTLY remove dressing, if sticking use saline or wound cleanser to lift dressing (wound bleeds VERY EASILY). Cover with Adaptic/ vaseline gauze double or triple layered and then with layer of Telfa. Secure with 2 large primipore or Medipore tape.   **ensure not to bump wound (bleeds easy)  **if bleeding occurs use quick clot and hold pressure for 3-5 mins to ensure bleeding stops. Leave quick clot in place and moisten prior to next dressing change.   ** if wound will not stop bleeding contact provider, wound may need to be cauterized or stitched.     Orders: Written    RECOMMEND PRIMARY TEAM ORDER: None, at this time  Education provided: plan of care and wound progress  Discussed plan of care with: Patient and Nurse  WOC nurse follow-up plan: weekly  Notify WOC if wound(s) deteriorate.  Nursing to notify the Provider(s) and re-consult the WOC Nurse if new skin concern.    DATA:     Current support surface: Standard  Atmos Air mattress  Containment of urine/stool: Brief and Incontinent pad in bed  BMI: Body mass index is 26.81 kg/m .   Active diet order:  Orders Placed This Encounter      Combination Diet Regular Diet; Liquidized/Moderately Thick (level 3)     Output: I/O last 3 completed shifts:  In: 20 [P.O.:20]  Out: 250 [Urine:250]     Labs: Recent Labs   Lab 09/06/22  0707 08/31/22  0557 08/30/22  1438   ALBUMIN  --   --  4.5   HGB 13.7   < > 15.6   INR  --   --  1.00   WBC 8.6   < > 8.7   A1C  --   --  5.7*    < > = values in this interval not displayed.     Pressure injury risk assessment:   Sensory Perception: 3-->slightly limited  Moisture: 3-->occasionally moist  Activity: 1-->bedfast  Mobility: 2-->very limited  Nutrition: 3-->adequate  Friction and Shear: 1-->problem  Jan Score: 13    Kelly Agosto RN CWOCN   Dept. Pager: [phone]  Dept. Office Number: [phone]

## 2022-09-06 NOTE — PLAN OF CARE
Acute Rehab Care Conference/Team Rounds      Type: Team Rounds    Present: Dr. Andrew Marcos DO,  Estrada Kevin Resident MD, Gunner León PT, Richard Chris OT, Hamida Perkins SLP, Yoselin Barkley [facility], Radha Mendez RD, Maribel Lazaro RN, Patient Stephani Garcia.    Discharge Barriers/Treatment/Education    Rehab Diagnosis: post CVA     Active Medical Co-morbidities/Prognosis:     Patient Active Problem List   Diagnosis Code     Acute ischemic stroke (H) I63.9     Hyperlipidemia E78.5     Hypertension I10     GILDA (obstructive sleep apnea) G47.33     Infection due to 2019 novel coronavirus U07.1     Infarction of right basal ganglia (H) I63.9         Safety:  Patient is alert and oriented x4. Dysarthric, some word difficulty finding. Uses the call light for needs. BP's elevated, on permissive hypertension, remains asymptomatic. On special precautions. Bed alarm on for safety.    Pain: Denied.    Medications, Skin, Tubes/Lines: Takes medications whole with thickened water. Has a rapid growing mass on the left upper breast, pending biopsy result, Woc consult in place. Mass on the right forearm noted as well. Sacral area is excoriated, foam dressing applied. No lines/ tubes.     Swallowing/Nutrition: Per chart review, Regular, moderately thick (3) liquids. SLP to assess, will need repeat VFSS prior to diet advancement     Bowel/Bladder: Mix continence of bowel and bladder. LBM 9/4. Uses the purewick at night. Pvr scans ongoing. May benefit from time toileting.     Psychosocial: SW assessment pending.     ADLs/IADLs: Pending OT evaluation today PM.    Mobility: Pending evaluation today    Cognition/Language: pending evaluation today.     Community Re-Entry: Pending evaluation today.    Transportation: Pending evaluation today.    Decision maker: self    Plan of Care and goals reviewed and updated.    Discharge Plan/Recommendations    Fall Precautions: continue    Patient/Family input to goals: yes     Estimated length of  stay: 16 days     Overall plan for the patient: reach a level of mod I       Utilization Review and Continued Stay Justification    Medical Necessity Criteria:    For any criteria that is not met, please document reason and plan for discharge, transfer, or modification of plan of care to address.    Requires intensive rehabilitation program to treat functional deficits?: Yes    Requires 3x per week or greater involvement of rehabilitation physician to oversee rehabilitation program?: Yes    Requires rehabilitation nursing interventions?: Yes    Patient is making functional progress?: Yes    There is a potential for additional functional progress? Yes    Patient is participating in therapy 3 hours per day a minimum of 5 days per week or 15 hours per week in 7 day period?:Yes    Has discharge needs that require coordinated discharge planning approach?:Yes      Barriers/Concerns related to meeting medical necessity criteria:  None     Team Plan to Address Concern:  As needed       Final Physician Sign off    Statement of Approval:  Andrew Marcos, DO      Patient Goals  Social Work Goals: Confirm discharge recommendations with therapy, coordinate safe discharge plan and remain available to support and assist as needed.     OT goals pending evaluation today.      PT Goals pending evaluation today.      Nursing Goals  Bowel and Bladder care  Fall prevention   Medication Education  Skin Care protection

## 2022-09-06 NOTE — PLAN OF CARE
FOCUS/GOAL  Medical management    ASSESSMENT, INTERVENTIONS AND CONTINUING PLAN FOR GOAL:    Patient is alert and oriented x4. Dysarthric, some word difficulty finding. Uses the call light for needs. On special precaution. Bed alarm on for safety. Takes medications whole with thickened water. Has a rapid growing mass on the left upper breast, pending biopsy result, Woc consult in place. Mass on the right forearm noted as well. Sacral area is excoriated, foam dressing applied. No lines/ tubes. Mix continence of bowel and bladder. LBM 9/4. Uses the purewick at night. Pvr scans ongoing. May benefit from time toileting. BP elevated tonight at 179/85, paged on-call, aware that pt is on permissive hypertension. No prn's ordered, To give morning amlodipine at 7am per on-call. Pt is asymptomatic. This morning, BP is 179/85, given morning scheduled amlodipine, asymptomatic.     Goal Outcome Evaluation:

## 2022-09-06 NOTE — PLAN OF CARE
Discharge Planner Post-Acute Rehab PT:     Discharge Plan: Home with  assist, 1 DAVE without rail. Home care vs OP PT, pending progress.    Precautions: Falls, L hemiplegia, dysphagia, possible breast CA, Covid special precautions    Current Status:  Bed Mobility: Min A supine<>sit  Transfer: Liko lift Ax2. Initiated mod A squat pivot to the R EOB>WC.  Gait: Unsafe  Stairs: Unsafe  Balance: Fair static/dynamic seated balance with LOB to the L.  PASS on 9/6: 13/36    Assessment:  Pt presents to PT with significant L hemiparesis with impaired sitting/standing balance and notable cognitive impairments s/p R frontal and basal ganglia CVA 8/30/22. As a result, pt is requiring significantly increased level of assist for functional mobility and at increaed risk for falls above baseline. She should benefit well from skilled PT, with goals for SBA WC-based mobility at discharge. ELOS 3-4 weeks.    Other Barriers to Discharge (DME, Family Training, etc):   Equipment: Anticipate K4 WC, quad cane, gait belt, L Eunice Neurexa  Family training to be scheduled.

## 2022-09-06 NOTE — PROGRESS NOTES
"   09/06/22 1226   General Information   Onset of Illness/Injury or Date of Surgery 08/30/22   Referring Physician Estrada Kevin MD   Patient/Family Therapy Goal Statement (SLP) \"get my thinking better\"   Pertinent History of Current Problem Per H&P:\"67 year old left hand dominant female with PMHx Hypertension, Hyperlipidemia, vertigo, GILDA (not using CPAP). She presented to [facility] 8/30/22 with stroke symptoms of left-sided partial hemiparesis and mild dysarthria/expressive aphasia on morning of, onset was the day prior. TPA/ thrombectomy not done due to unknown last normal, and lack of large vessel occlusion only 30% stenosis of right ICA on CTA. Brain MRI showed ischemic stroke with a small cluster of recent infarcts in the right frontal lobe and right basal ganglia. She also tested positive for asymptomatic COVID infection on admission. Had a recent breast mass biopsy 9/2, with pathology results pending but very concerning for cancer, this was following a notable suspicious breast mass which has grown quickly and has begun to bleed and is causing skin compromise . She was a very poor candidate for surgery because of the size of the mass and because of her recent stroke.\" SLP consulted to assess cognition and dysphagia   General Observations Pt is alert and agreeable to assessment. Pt endorses increased difficulty with word finding and changes to cognition functioning post CVA. Pt seen by SLP for dysphagia during recent hosptialization. VFSS completed 9/2, see below for details. Pt admitted on regular texture, moderately thick (3) liquid diet.   Pain Assessment   Patient Currently in Pain No   Type of Evaluation   Type of Evaluation Speech, Language, Cognition   Speech   Speech Intelligibility (Motor Speech) WNL   Auditory Comprehension   Follows Commands (Auditory Comprehension) 3-step commands   Paragraph Comprehension (Auditory Comprehension) WNL   Picture Identification (Auditory Comprehension) WNL "   Object Identification (Auditory Comprehension) WNL   Yes/No Questions (Auditory Comprehension) WNL   3 Step, Follows Commands (Auditory Comprehension) intact   Verbal Expression   Confrontational Naming (Verbal Expression) WNL   Conversational Speech (Verbal Expression) connected speech   Narrative Speech (Verbal Expression) storytelling/retelling   Word Finding Skills (Verbal Expression) generative naming   Generative Naming, Word Finding (Verbal Expression) impaired   Storytelling/Retelling, Narrative Speech (Verbal Expression) minimal impairment;moderate impairment   Connected Speech, Conversational (Verbal Expression) minimal impairment   Reading Comprehension   Oral Reading Ability (Reading Comprehension) sentence level;word level   Comprehension Level (Reading Comprehension) sentence level tasks;paragraph level tasks   Word Level, Oral Reading Ability (Reading Comprehension) intact   Sentence Level, Oral Reading Ability (Reading Comprehension) intact   Sentence Level, Comprehension Level (Reading Comprehension) intact   Paragraph Level, Comprehension Level (Reading Comprehension) minimal impairment   Written Language   Written Expression (Written Language) word level;sentence level   Functional Tasks (Written Language) name/signature   Sentence Level, Written Expression (Written Language) minimal impairment;moderate impairment   Word Level, Written Expression (Written Language) intact   Name/Signature, Functional Tasks (Written Language) intact   Cognition   Cognitive Function attention deficit;executive function deficit;memory deficit   Cognitive Status mild cognitive impairment   Additional cognitive-linguistic evaluation indicated  Completed   Orientation Status (Cognition) oriented x 4  (with extra time)   Affect/Mental Status (Cognition) WNL   Follows Commands (Cognition) delayed response/completion;increased processing time needed   General Therapy Interventions   Planned Therapy Interventions  Cognitive Treatment   Cognitive treatment External memory strategy training;Progressive attention training;Internal memory strategy training   Clinical Impression   Criteria for Skilled Therapeutic Interventions Met (SLP Eval) Yes, treatment indicated   SLP Diagnosis mild cognitive linguistic impairment   Functional Limitations Related to Problem List (SLP) IADLs   Risks & Benefits of therapy have been explained evaluation/treatment results reviewed;care plan/treatment goals reviewed;participants included;patient   Clinical Impression Comments SLP: Pt seen on ARU for cognitive linguistic assessment following recent R ischemic CVA. Pt endorses increased difficulty with word finding and further changes to cognition. Pt given SCCAN, see below for details. Pt overall presents with mild-mod cognitive impairments with deficits re: language, attention, memory, and reasoning. 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 Pt demonstrates below baseline level of function and would benefit from skilled SLP to treat above listed impairmetns and improve overall functional level of IND.    Total Evaluation Time   Total Evaluation Time (Minutes) 30  (cog)   SLP Goals   Therapy Frequency (SLP Eval) daily   SLP Predicted Duration/Target Date for Goal Attainment 09/27/22   SLP Goals SLP Goal 1;SLP Goal 2;SLP Goal 3   SLP: Goal 1 Pt will demonstrate and initiate a plan of mod complexity with min cues 90% accuracy   SLP: Goal 2 Pt will recall novel and functional information of mod complexty with min cues 90% accuracu   SLP: Goal 3 Pt will implement word finding strategies within conversations with min cues 90% accuracy       Scales of Cognitive and Communicative Ability for Neurorehabilitation (SCCAN):    Total raw score:  75    Degree of severity: mild      SCCAN Scale Performance    Percentage Score%  Oral Expression (OE):     84 %    Orientation (OR):     100 %    Memory (ME):      47 %    Speech Comprehension  (SP):   92 %    Reading Comprehension (RD):   83 %    Writing (WR):      85 %    Attention (AT):      68 %    Problem Solving (PS):     86 %        Interpretation of scores: Pt overall presents with mild-mod cognitive impairments with deficits re: language, attention, memory, and reasoning      Time scoring: 10      Total time: 40

## 2022-09-06 NOTE — PROGRESS NOTES
[facility]   Acute Rehabilitation Unit  Daily progress note    INTERVAL HISTORY  Stephani Garcia was seen and examined at bedside.  BP was noted to run high > 180's for SBP overnight despite Lisinopril. Paged and added Amlodipine 5 mg daily this morning. Was noted to not decreasing and patient with dizziness this morning requiring PRN hydralazine to be added. She also was noted to have a poor night of sleep. Discussed with her to add some melatonin in the evenings. Patient is still feeling a little low and option for psychology was give to help support the current diagnosis, she was agreeable to talking to the team. She remains asymptomatic for Covid infection. Denies fever, Headaches, chills, N/V, changes in vision, Abdominal pain or symptoms of LUTS.    Functionally,   SLP   Pt seen on ARU for cognitive linguistic assessment following recent R ischemic CVA. Pt endorses increased difficulty with word finding and further changes to cognition. Pt given SCCAN, see below for details. Pt overall presents with mild-mod cognitive impairments with deficits re: language, attention, memory, and reasoning. 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 Pt demonstrates below baseline level of function and would benefit from skilled SLP to treat above listed impairmetns and improve overall functional level of IND.    OT    PT  Bed Mobility: Min A supine<>sit  Transfer: Liko lift Ax2. Initiated mod A squat pivot to the R EOB>WC.  Gait: Unsafe  Stairs: Unsafe  Balance: Fair static/dynamic seated balance with LOB to the L.  PASS on 9/6: 13/36    Today's Updates:  - Psychology Consult placed. For adjustment to new diagnosis   - Hydralazine PRN added for hypertension  - Amlodipine daily added for hyperttension  - Continue to monitor BP    MEDICATIONS    amLODIPine  5 mg Oral Daily     aspirin  81 mg Oral Daily     atorvastatin  80 mg Oral QPM     clopidogrel  75 mg Oral  "Daily     heparin ANTICOAGULANT  5,000 Units Subcutaneous Q12H     lisinopril  20 mg Oral At Bedtime     pantoprazole  40 mg Oral QAM AC        acetaminophen, hydrALAZINE, polyethylene glycol, senna-docusate     PHYSICAL EXAM  BP (!) 172/93 (BP Location: Left arm, Patient Position: Sitting)   Pulse 105   Temp (!) 95.8  F (35.4  C) (Oral)   Resp 18   Ht 1.702 m (5' 7\")   Wt 77.7 kg (171 lb 3.2 oz)   SpO2 96%   BMI 26.81 kg/m    General: Laying in bed not in acute distress  HEENT: EOMI (strabismus in L. Eye), NC/NT, Moist mucous membranes  Pulmonary: Breathing comfortably on room air, clear to auscultation bilaterally  Cardiovascular: S1 and S2, Regular rate and rhythm, no M/R/G  Abdominal: Non-tender, non-distended, bowel sounds present in all four quadrants   Extremities: warm, well perfused, no edema in bilateral lower extremities, no tenderness in calves, power unchanged with L. Hemiparesis.   Neuro/MSK: Alert and oriented, face symmetric, Dysarthric.    LABS  Recent Results (from the past 24 hour(s))   CBC with platelets and differential    Collection Time: 09/06/22  7:07 AM   Result Value Ref Range    WBC Count 8.6 4.0 - 11.0 10e3/uL    RBC Count 4.81 3.80 - 5.20 10e6/uL    Hemoglobin 13.7 11.7 - 15.7 g/dL    Hematocrit 41.5 35.0 - 47.0 %    MCV 86 78 - 100 fL    MCH 28.5 26.5 - 33.0 pg    MCHC 33.0 31.5 - 36.5 g/dL    RDW 13.0 10.0 - 15.0 %    Platelet Count 282 150 - 450 10e3/uL    % Neutrophils 72 %    % Lymphocytes 17 %    % Monocytes 8 %    % Eosinophils 2 %    % Basophils 0 %    % Immature Granulocytes 1 %    NRBCs per 100 WBC 0 <1 /100    Absolute Neutrophils 6.3 1.6 - 8.3 10e3/uL    Absolute Lymphocytes 1.4 0.8 - 5.3 10e3/uL    Absolute Monocytes 0.7 0.0 - 1.3 10e3/uL    Absolute Eosinophils 0.2 0.0 - 0.7 10e3/uL    Absolute Basophils 0.0 0.0 - 0.2 10e3/uL    Absolute Immature Granulocytes 0.1 <=0.4 10e3/uL    Absolute NRBCs 0.0 10e3/uL   Extra Green Top (Lithium Heparin) Tube    Collection Time: " "09/06/22  7:07 AM   Result Value Ref Range    Hold Specimen [facility]        Rehabilitation - continue comprehensive acute inpatient rehabilitation program with multidisciplinary approach including therapies, rehab nursing, and physiatry following. See interval history for updates.      ASSESSMENT AND PLAN  # Acute ischemic stroke   # Recent infarcts in the right frontal lobe and right basal ganglia  Noted left-sided partial hemiparesis and mild dysarthria/expressive aphasia at presentation 8/30. MRI confirmed  Acute ischemic stroke, and TPA/ thrombectomy 2/2 unknown last known normal. CTA showed no large vessel occlusion, with only 30% of ICA occlusion. Echo was normal EF 65% on 8/31. No Afib noted with telemetry.  at baseline. BP goal is <140/90 at rest, anticipate increase with therapies.   - Continue Plavix 75 mg and aspirin 81 mg for 90 days then switch to ASA 325mg mono therapy  - Continue Atorvastatin 80 mg at bedtime.   - Needs PT/OT/SLP evaluation       # Dysphagia    Noted during Video swallow study on 9/2/22: \"Patient had direct, silent aspiration with mildly thick liquid on first trial and deep penetration with mildly thick liquids on subsequent trials. No aspiration or penetration with moderately thick liquids.   - Continue dysphagia diet with moderately thick fluids  - SLP to evaluate     # Left Breast mass  Has had a FNA 9/2 by surgery. There is a high suspicion for breast CA. Surgery rec'd neoadjuvent treatment with hopes of shrinking tumor prior to surgery. Unable to surgically operated due to stroke at the moment.   - Needs referral to oncology as outpatient.     - Follow up path results   - WOCN consult: appreciate recs 9/6      # Hyperlipidemia    at baseline.  - statin high-dose as above     # Hypertension  Was allowed permissive HTN, first 24 hours but started to bring it down slowly. Slowly decrease BP, with goal to keep <140/90 at rest.  - Continue lisinopril 20 mg daily  - Started " Amlodipine 5 mg Daily 9/6  - Started Hydralazine 10 mg PRN 9/6  - Monitor BP anticipate some increases during the therapy sessions.       #GILDA (obstructive sleep apnea):  Patient does not use CPAP at baseline. May need repeat sleep study and eval by sleep medicine team.   - encourage using CPAP and call RT for trial     # Asymptomatic COVID 19 Infection    Positive Covid test on 8/3 with preceding cold symptoms for 3 days prior. Currently she continues remain asymptomatic from COVID symptoms on 9/5. She has no cough, wheezing, SOB, fever, chills, body aches or any type of cold symptoms.  She has completed remdesivir x 3 days.  - Could possibly come off quarantine 9/9 if Covid test is neg.     # Neurogenic Bladder  Using diaper 2/2 incontinence. Discussed with her on options to do timed voids during the stay.  - Consider doing timed voids   - Check PVR's initially for any retention.       # Bowel   Noted mild loose stools 9/4. Has no had BM as of this morning.  - Start PRN Bowel meds with Senna and Miralax. Will observe constipation.     # Sleep adjustment  - Added Melatonin 3 mg for sleep      6. Adjustment to disability:  Clinical psychology to eval and treat. Consult sent 9/6.   7. FEN: Regular Diet Adult Liquidized/Moderately Thick (level 3)  8. Bowel: PRN Senna and Miralax  9. Bladder: Pending PVR review, mgith need a scheduled bladder program  10. DVT Prophylaxis: Heparin 5000 units for DVT ppx. Plavix 75 mg daily and ASA 81 mg 90 days then ASA alone.   11. GI Prophylaxis: Pantoprazole 40 mg   12. Code: Full  13. Disposition: Home  14. ELOS:  21 days.  15. Rehab prognosis:  Fair  16. Follow up Appointments on Discharge:   - PCP follow up 7-14 days post discharge  - Follow-up with neurology in 6 to 8 weeks  - Referral for oncology as an outpatient  - GILDA: outpatient referral to sleep medicine clinic if agreeable       Patient seen and discussed with Dr. Marcos, PM&R Staff Physician    Estrada Kevin MD    Resident Physician, PGY-2   Physical Medicine and Rehabilitation  [facility]

## 2022-09-06 NOTE — DISCHARGE INSTRUCTIONS
PCP  You are scheduled to see Megan Salazar MD on October 26 2022 at 7:10 am.    Address  [address]   [address]   [place] [place] [address]  Phone   [phone]      Neurology  You are scheduled to see *** on *** at ***.  Referral needed to schedule    Address  [address]    [address]rd Floor                          [place] [address]  Phone   [phone]      Oncology Referral for given biopsy results   You are scheduled to see Dr. France on October 5 2022 at 12:45 pm.    Address  [address] [place], [place], [address]  Phone   [phone]       Sleep Medicine  You are scheduled to see Dr. Daniel on  January 20 2023 at 12:45 pm.      Address  [address] [place] [id number]  Phone   [phone]        Home Health Care:   [facility]  PH: [phone], Fax: [phone]   Nurse, physical therapy, occupational therapy, speech therapy, home health aide   *Details regarding insurance coverage for home health care: Member would be 100% responsible for services until satisfying deductible, $250 remaining (as of 09/19/2022). Once met, member would have a 5% coinsurance per visit.     [facility]   PH: [phone]  Referral completed on your behalf 09/27/2022 and a representative will reach out to you for additional information, support, and resources.   Can help with applying for medicare and picking out a secondary insurance. Can provide additional resources to stay safe at home.     [place] [employer]  Additional resources and contact information online   Www.strokemn.org & www.braininjurymn.org  Types of services that Stroke/Brain Injury Resource Facilitation offers include:  Emotional support in coping with a  new normal   Finding mental health support and counselors who understand brain injury and stroke  Finding a support group  Finding or re-connecting to rehabilitation services  Finding where and how  to get a brain injury assessed  Finding or re-connecting with a primary care physician  Supporting caregivers by connecting them with resources  Education about diagnosis and symptoms -  Is this normal   Helping educate family and loved ones of the survivor s  invisible  symptoms  Figuring out the logistics of returning to work, vocational rehab and understanding ADA rights  If not going back to work, support in finding meaningful ways to structure your day and exploring volunteer options  Coaching on navigation the federal, state and [facility] health and disability service system with additional support and conference calls as needed  Help finding appropriate legal support for appealing and navigating Social Security Disability, providing advocacy on the individual s behalf as needed and connecting with cost effective alternatives to  as needed  Supporting parents/students with how to discuss return to school and sports with educators and connecting to a TBI specialist or parent advocate group if needed  Connecting to addiction (alcohol/drug/gambling/smoking) support and treatment services  Support with creative problem solving to life barriers.  *These are just a few examples of common things that Resource Facilitation can help with. They are not limited to what is listed here so if you are curious if they can help, just ask.   Call us at [phone] or [phone].        To reduce the risk of subsequent stroke there are several important factors including optimal management of anticoagulants, blood pressure, cholesterol, diabetes and smoking abstinence.    Anticoagulation:  You are on Aspirin and Clopidogrel    Blood Pressure:  Keeping your blood pressures less than 130/80 has been shown to reduce risk of recurrent stroke. Recording your blood pressure and heart rate twice daily in a log book can help you and your providers make decisions on optimal management. You are encouraged to bring your log book  "with you to your primary physician and/or cardiology doctor visits.    You are currently on norvasc and lisinopril to help control your blood pressure. Several lifestyle modifications have been associated with blood pressure reduction and are an important part of a comprehensive plan. These include: weight loss (if over-weight); a diet low in salt and cholesterol and rich in fruits and vegetables; regular aerobic physical activity and limited alcohol consumption.    Diabetes:  You do not have diabetes though it is important to continue monitoring for this in the future with your primary provider.    Diet:  Currently  you're on reg with thins.  Diet can significantly affect your levels and should be part of your plan. Please see additional information from dietitian about this.    Cholesterol:  Traditional target levels for LDL cholesterol or \"bad cholesterol\" is less than 130 however once you have had a stroke, your target LDL level is now less than 70. Additional recommendations such as increasing your HDL or \"good\" cholesterol and lowering your triglyceride level can also be important.    Your most recent lipid panel was   Lab Results   Component Value Date    CHOL 281 08/30/2022     Lab Results   Component Value Date    HDL 48 08/30/2022     Lab Results   Component Value Date     08/30/2022     Lab Results   Component Value Date    TRIG 136 08/30/2022     No results found for: CHOLHDLRATIO    This should be followed up in 2-3 months with your primary provider.    Smoking:  Finally one of the most important modifiable risk factors is to not smoke. This includes cigarettes, pipes, cigars, chewing tobacco and second hand smoke. Support through counseling, nicotine replacement, and oral smoking-cessation medications may all be helpful. Often people have been able to quit during their hospitalization but once returning to their familiar environment, the urges can be stronger. If this is the case, we encourage " you to get support. There are numerous options, start by talking with your doctor.

## 2022-09-06 NOTE — PROGRESS NOTES
09/06/22 1200   Quick Adds   Type of Visit Initial Occupational Therapy Evaluation   Living Environment   People in Home spouse   Current Living Arrangements house   Home Accessibility stairs to enter home;stairs within home   Number of Stairs, Main Entrance 1   Stair Railings, Main Entrance none   Stair Railings, Within Home, Primary railing on left side (ascending)   Transportation Anticipated family or friend will provide   Living Environment Comments OT: Per pt and chart review. [place] [facility] [place] [facility] All needs able to be met on main level. Son plans to stay with pt and spouse initially to provide A.   Self-Care   Usual Activity Tolerance good   Current Activity Tolerance fair   Regular Exercise No   Equipment Currently Used at Home none   Fall history within last six months no   Number of times patient has fallen within last six months 1   Activity/Exercise/Self-Care Comment OT: PTA IND ADLs with no device.   Instrumental Activities of Daily Living (IADL)   IADL Comments OT: PTA IND IADLs including driving and full time work for [employer].   Post-Acute Assessment Only   Post-Acute Functional Assessment See IP Rehab Daily Documentation Flowsheet for Functional Mobility/ADL Assessment   Previous Level of Function/Home Environm   Bathing/Grooming, Premorbid Functional Level independent   Dressing, Premorbid Functional Level independent   Eating/Feeding, Premorbid Functional Level independent   Toileting, Premorbid Functional Level independent   BADLs, Premorbid Functional Level independent   IADLs, Premorbid Functional Level independent   Bed Mobility, Premorbid Functional Level independent   Transfers, Premorbid Functional Level independent   Household Ambulation, Premorbid Functional Level independent   Stairs, Premorbid Functional Level independent   Community Ambulation, Premorbid Functional Level independent   Functional Cognition, Premorbid Functional Level WNL  "  Previous Level of Function, Premorbid IND I/ADLs PTA   General Information   Onset of Illness/Injury or Date of Surgery 08/30/22   Referring Physician Andrew Marcos, DO   Additional Occupational Profile Info/Pertinent History of Current Problem Per H&P:\"67 year old left hand dominant female with PMHx Hypertension, Hyperlipidemia, vertigo, GILDA (not using CPAP). She presented to [facility] 8/30/22 with stroke symptoms of left-sided partial hemiparesis and mild dysarthria/expressive aphasia on morning of, onset was the day prior. TPA/ thrombectomy not done due to unknown last normal, and lack of large vessel occlusion only 30% stenosis of right ICA on CTA. Brain MRI showed ischemic stroke with a small cluster of recent infarcts in the right frontal lobe and right basal ganglia. She also tested positive for asymptomatic COVID infection on admission. Had a recent breast mass biopsy 9/2, with pathology results pending but very concerning for cancer, this was following a notable suspicious breast mass which has grown quickly and has begun to bleed and is causing skin compromise . She was a very poor candidate for surgery because of the size of the mass and because of her recent stroke.\"   Performance Patterns (Routines, Roles, Habits) Left hand dominant, works full time, lives with spouse   Existing Precautions/Restrictions fall;swallowing  (LUE WBAT d/t hemiplegia, potential breast cancer, covid + special precautions)   Limitations/Impairments aphasia;safety/cognitive;swallowing   Left Upper Extremity (Weight-bearing Status) weight-bearing as tolerated (WBAT)   Right Upper Extremity (Weight-bearing Status) full weight-bearing (FWB)   Left Lower Extremity (Weight-bearing Status) weight-bearing as tolerated (WBAT)   Right Lower Extremity (Weight-bearing Status) full weight-bearing (FWB)   Heart Disease Risk Factors Medical history;Age;Overweight   Cognitive Status Examination   Orientation Status orientation to " person, place and time   Executive Function Deficit organization/sequencing;problem-solving/reasoning   Cognitive Status Comments OT: Ox4. Noted functional cognition deficits impacting wordfinding, problem solving oral cares s/u EOB, and attention. L inattention noted in tasks.   Visual Perception   Impact of Vision Impairment on Function (Vision) OT: Strabismus at baseline per MD. Difficulty screening saccades, pursuits, and VFD.   Sensory   Sensory Comments OT: Pt endorsing N/T at DIPs of LUE, especially long and ring fingers. No light touch deficits noted in screens.   Pain Assessment   Patient Currently in Pain No   Integumentary/Edema   Integumentary/Edema Comments Left breast and sacrum wounds per chart, followed by WOC RN.   Range of Motion Comprehensive   Comment, General Range of Motion RUE WNL. LUE PROM WNL within precautions.   Strength Comprehensive (MMT)   Comment, General Manual Muscle Testing (MMT) Assessment RUE WNL. LUE scapular retraction 3/5, sh flex 2/5, elbow flex 1/5, remainder 0/5 MMT.   Muscle Tone Assessment   Muscle Tone Quick Adds LUE   Muscle Tone Assessment - LUE flaccid   Coordination   Coordination Comments RUE able to manipultate oral cares containers. LUE deficits in gross and fine motor.   Bed Mobility   Comment (Bed Mobility) Min A sup > sit.   Clinical Impression   Criteria for Skilled Therapeutic Interventions Met (OT) Yes, treatment indicated   OT Diagnosis Impaired ADLs and functional mobility   Influenced by the following impairments L hemiplegia, strabismus baseline, cognitive deficits   OT Problem List-Impairments impacting ADL problems related to;activity tolerance impaired;balance;cognition;communication;coordination;eating/swallowing;mobility;muscle tone;motor control;range of motion (ROM);strength   Assessment of Occupational Performance 5 or more Performance Deficits   Identified Performance Deficits dressing, toileting, bathing, grooming, functional mobility   Planned  Therapy Interventions (OT) ADL retraining;IADL retraining;balance training;cognition;E-stim;bed mobility training;fine motor coordination training;manual therapy;motor coordination training;neuromuscular re-education;orthoic fitting/training;ROM;strengthening;home program guidelines;progressive activity/exercise;risk factor education   Clinical Decision Making Complexity (OT) high complexity   Anticipated Equipment Needs Upon Discharge (OT) bathing equipment;dressing equipment;toileting equipment   Risk & Benefits of therapy have been explained evaluation/treatment results reviewed;care plan/treatment goals reviewed;risks/benefits reviewed;current/potential barriers reviewed;participants voiced agreement with care plan;participants included;patient   Clinical Impression Comments OT: Pt presents to OT with significantly decreased independence and safety in ADLs and functional mobility s/p CVA. Performance limited by L hemiplegia and functional cognition deficits in context of covid 19 + special precautions. Goals to progress to SBA-min A ADLs with A from family at discharge. ELOS 3-4 weeks.   OT Goals   Therapy Frequency (OT) Daily   OT Predicted Duration/Target Date for Goal Attainment 09/26/22   OT Goals Hygiene/Grooming;Upper Body Dressing;Lower Body Dressing;Upper Body Bathing;Lower Body Bathing;Bed Mobility;Transfers;Toilet Transfer/Toileting;Meal Preparation;Home Management;Cognition   OT: Hygiene/Grooming supervision/stand-by assist;within precautions   OT: Upper Body Dressing Supervision/stand-by assist;within precautions   OT: Lower Body Dressing Minimal assist;using adaptive equipment;within precautions   OT: Upper Body Bathing Minimal assist;using adaptive equipment;within precautions   OT: Lower Body Bathing Minimal assist;using adaptive equipment;with precautions   OT: Bed Mobility Modified independent;within precautions   OT: Transfer Supervision/stand-by assist;with assistive device;within precautions    OT: Toilet Transfer/Toileting Minimal assist;cleaning and garment management;using adaptive equipment;within precautions   OT: Meal Preparation Minimal assist;using adaptive equipment;with simple meal preparation   OT: Home Management Minimal assist;with light demand household tasks;within precautions;using adaptive equipment   OT: Cognitive Patient/caregiver will verbalize understanding of cognitive assessment results/recommendations as needed for safe discharge planning

## 2022-09-06 NOTE — PLAN OF CARE
Postural Assessment for Stroke Scale (PASS)    Maintaining posture -   1) Sitting without support - 2  2) Standing with support (feet position free) - 2  3) Standing without support (feet position free) - 0  4) Standing on nonparetic leg - 0  5) Standing on paretic leg - 0    Changing posture -  6) Rolling supine -> affected side - 2  7) Rolling supine -> unaffected side - 2  8) Sup->sitting edge of bed - 1  9) Sitting edge of bed -> sup - 2  10) Sit->stand without support - 1  11) Stand->sit without support - 1  12) Standing,  pencil from floor without support - 0    Total Score - 13/36    The PASS assesses a patient's ability to change and maintain posture during different balance activities. It is not associated with falls risk, but is used to track progress with the patient's ability to control their posture and balance throughout the course of the patient's admission.

## 2022-09-06 NOTE — PROGRESS NOTES
Discharge Planner Post-Acute Rehab OT:     Discharge Plan: home with spouse A, HC OT vs OP OT pending progress    Precautions: falls, covid + special precautions    Current Status:  ADLs:  Mobility: Ax1 SaraStedy with therapies only.  Ax2 liko with nsg.  Grooming: SBA EOB wash face  Dressing: NT  Bathing: NT  Toileting: Bed pan with nsg. Simulated Ax1 SaraStedy > OTC.  IADLs: IND PTA. Anticipate A at discharge.  Vision/Cognition: Strabismus baseline. L inattention noted in functional tasks. Functional deficits problem solving, sequencing, and wordfinding.    Assessment:  Pt presents to OT with significantly decreased independence and safety in ADLs and functional mobility s/p CVA. Performance limited by L hemiplegia and functional cognition deficits in context of covid 19 + special precautions. Goals to progress to SBA-min A ADLs with A from family at discharge and continued therapies. ELOS 3-4 weeks.    Other Barriers to Discharge (DME, Family Training, etc): All needs met on main level.     DME:    Family training - Not scheduled as of 9/6/22.

## 2022-09-06 NOTE — PLAN OF CARE
Discharge Planner Post-Acute Rehab SLP:     Discharge Plan: Ongoing SLP, HH vs OP ?    Precautions: COVID, aspiration     Current Status:  Communication: Word finding difficulties in conversation   Cognition: Mild-mod cognitive impairments with deficits re: memory, attention, reasoning, language. Benefits from extra time   Swallow: Regular, moderately thick (3) liquid. Upright fully all PO. Medications given as tolerated with 3 liquid or puree.     Assessment:  Pt seen on ARU for dysphagia assessment following recent R ischemic CVA. Pt seen with trials of regular texture, moderately thick (3) liquid by cup. Pt with extended mastication, delayed AP transit, no oral residuals. Single instance cough following mixed consistency otherwise no consistent s/sx aspiration with current diet. Pt presents at below baseline level of function and would benefit from skilled SLP to treat dysphagia and modify diet as clinically and instrumentally indicated. Will plan to repeat VFSS in ~1 week pending clinical improvement. Recommend resume current diet .     Pt seen on ARU for cognitive linguistic assessment following recent R ischemic CVA. Pt endorses increased difficulty with word finding and further changes to cognition. Pt given SCCAN, see below for details. Pt overall presents with mild-mod cognitive impairments with deficits re: language, attention, memory, and reasoning. 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 Pt demonstrates below baseline level of function and would benefit from skilled SLP to treat above listed impairmetns and improve overall functional level of IND.    Other Barriers to Discharge (Family Training, etc): family training education re: thickened liquids (pending improvement) and IADL support

## 2022-09-06 NOTE — PLAN OF CARE
Goal Outcome Evaluation:    Plan of Care Reviewed With: patient     Overall Patient Progress: no change    Outcome Evaluation: Patient assessments today. BP remains elevated, PRN HTN medication added today by providers.    Orientation: Alert and oriented x4  Bowel: LBM 9/6 Continent and incontinent  Bladder: Continent and incontinent  Pain: Denies pain  Ambulation/Transfers: Assist of 2 with Liko Lift  Diet/ Liquids: Regular diet with moderately thick liquids. Takes pills whole.  Skin: WOC assessed L chest/breast wound and coccyx wound, redressed and new orders as charted. Quick Clot dressings in patient's room in case of continued bleeding. Coccyx has Mepilex present for protection per WOC. Bruises on arm, shoulder, and abdomen.

## 2022-09-06 NOTE — PROGRESS NOTES
"   09/06/22 1200   General Information   Onset of Illness/Injury or Date of Surgery 08/30/22   Referring Physician Estrada Kevin MD   Patient/Family Therapy Goal Statement (SLP) \"get my thinking better\"   Pertinent History of Current Problem Per H&P:\"67 year old left hand dominant female with PMHx Hypertension, Hyperlipidemia, vertigo, GILDA (not using CPAP). She presented to [facility] ED 8/30/22 with stroke symptoms of left-sided partial hemiparesis and mild dysarthria/expressive aphasia on morning of, onset was the day prior. TPA/ thrombectomy not done due to unknown last normal, and lack of large vessel occlusion only 30% stenosis of right ICA on CTA. Brain MRI showed ischemic stroke with a small cluster of recent infarcts in the right frontal lobe and right basal ganglia. She also tested positive for asymptomatic COVID infection on admission. Had a recent breast mass biopsy 9/2, with pathology results pending but very concerning for cancer, this was following a notable suspicious breast mass which has grown quickly and has begun to bleed and is causing skin compromise . She was a very poor candidate for surgery because of the size of the mass and because of her recent stroke.\" SLP consulted to assess cognition and dysphagia   General Observations Pt is alert and agreeable to assessment. Pt endorses increased difficulty with word finding and changes to cognition functioning post CVA. Pt seen by SLP for dysphagia during recent hosptialization. VFSS completed 9/2, see below for details. Pt admitted on regular texture, moderately thick (3) liquid diet.   Past History of Dysphagia Prior to hospitalization, no prior dysphagia per pt. VFSS completed 9/2 indicating:\"Videofluoroscopic Swallow Study completed. Patient had direct, silent aspiration with mildly thick liquid on first trial and deep penetration with mildly thick liquids on subsequent trials. No aspiration or penetration with moderately thick liquids. Oral " "motor function was WFL overall with mild left asymmetry and oral phase was minimally impaired with premature spillage of mildly thick liquids. Tongue base retraction was complete. Swallow response was mild to moderately delayed with mildly thick liquids and mildly delayed with puree and moderately thick. Epiglottic inversion was complete consistently.  No significant stasis occurred with any intake. Hyolaryngeal elevation was inconsistent in movement, mildly delayed but largely intact and hyolaryngeal excursion was inconsistent with range from nearly absent to complete. Recommend diet of Regular textures and Moderately thick liquids. Repeat VFSS in 1-2 weeks or as improvement noted at bedside.\"   Pain Assessment   Patient Currently in Pain No   Type of Evaluation   Type of Evaluation Swallow Evaluation   Oral Motor   Oral Musculature anomalies present   Mucosal Quality good   Dentition (Oral Motor)   Dentition (Oral Motor) adequate dentition   Facial Symmetry (Oral Motor)   Facial Symmetry (Oral Motor) left side impairment   Lip Function (Oral Motor)   Lip Range of Motion (Oral Motor) WNL   Tongue Function (Oral Motor)   Tongue Strength (Oral Motor) strength decreased   Tongue Coordination/Speed (Oral Motor) reduced rate   Tongue ROM (Oral Motor) protrusion is impaired   Jaw Function (Oral Motor)   Jaw Function (Oral Motor) WNL   Cough/Swallow/Gag Reflex (Oral Motor)   Volitional Throat Clear/Cough (Oral Motor) WNL   Volitional Swallow (Oral Motor) WNL   Vocal Quality/Secretion Management (Oral Motor)   Vocal Quality (Oral Motor) WNL   General Swallowing Observations   Respiratory Support (General Swallowing Observations) none   Current Diet/Method of Nutritional Intake (General Swallowing Observations, NIS) regular diet;moderately thick liquids/liquidized (level 3)   Swallowing Evaluation Clinical swallow evaluation   Clinical Swallow Evaluation   Feeding Assistance no assistance needed   Additional evaluation(s) " completed today Yes   Rationale for completing additional evaluation cognition   Clinical Swallow Evaluation Textures Trialed moderately thick liquids/liquidized;solid foods   Clinical Swallow Eval: Moderately Thick Liquids   Mode of Presentation cup;self-fed   Volume Presented 3 oz   Oral Phase WFL   Pharyngeal Phase intact   Clinical Swallow Evaluation: Solid Food Texture Trial   Mode of Presentation self-fed   Volume Presented cracker   Oral Phase impaired mastication;delayed AP movement;premature pharyngeal entry   Pharyngeal Phase coughing/choking   Diagnostic Statement single instance coughing following mixed consistency trial but was not consistent across PO   Esophageal Phase of Swallow   Patient reports or presents with symptoms of esophageal dysphagia No   Esophageal sweep performed during today s vidofluoroscopic exam  No   Swallowing Recommendations   Diet Consistency Recommendations regular diet;moderately thick liquids/liquidized (level 3)   Supervision Level for Intake distant supervision needed   Mode of Delivery Recommendations bolus size, small;slow rate of intake   Swallowing Maneuver Recommendations alternate food and liquid intake   Monitoring/Assistance Required (Eating/Swallowing) monitor for cough or change in vocal quality with intake;stop eating activities when fatigue is present   Recommended Feeding/Eating Techniques (Swallow Eval) set-up and prepare tray;maintain upright posture during/after eating for 30 minutes;minimize distractions during oral intake   Medication Administration Recommendations, Swallowing (SLP) whole or crushed with level 3 liquid   Instrumental Assessment Recommendations VFSS (videofluoroscopic swallowing study)   General Therapy Interventions   Planned Therapy Interventions Dysphagia Treatment   Dysphagia treatment Oropharyngeal exercise training;Modified diet education;Instruction of safe swallow strategies;Compensatory strategies for swallowing   Clinical  Impression   Criteria for Skilled Therapeutic Interventions Met (SLP Eval) Yes, treatment indicated   SLP Diagnosis mod oropharyngeal  dysphagia   Problem List (SLP) aspiration   Functional Limitations Related to Problem List (SLP) PO intake, aspiration   Risks & Benefits of therapy have been explained evaluation/treatment results reviewed;care plan/treatment goals reviewed;participants included;patient   Clinical Impression Comments SLP: Pt seen on ARU for dysphagia assessment following recent R ischemic CVA. Pt seen with trials of regular texture, moderately thick (3) liquid by cup. Pt with extended mastication, delayed AP transit, no oral residuals. Single instance cough following mixed consistency otherwise no consistent s/sx aspiration with current diet. Pt presents at below baseline level of function and would benefit from skilled SLP to treat dysphagia and modify diet as clinically and instrumentally indicated. Will plan to repeat VFSS in ~1 week pending clinical improvement. Recommend resume current diet .    Total Evaluation Time   Total Evaluation Time (Minutes) 30  (dysphagia)   SLP Goals   Therapy Frequency (SLP Eval) daily   SLP Predicted Duration/Target Date for Goal Attainment 09/27/22   SLP Goals Swallow

## 2022-09-06 NOTE — PLAN OF CARE
Attempted to reach  for post rounds phone call, but unable to reach or leave VM on mobile or home phones.

## 2022-09-06 NOTE — PROGRESS NOTES
New Patient Oncology Nurse Navigator Note     Referring provider: Herrera Huggins MD     [facility]     Referred to (specialty:) Medical Oncology      Date Referral Received: September 6, 2022     Evaluation for:  Breast cancer (presumed and 9/2 FNA results pending)     Clinical History (per Nurse review of records provided):       She presented to [facility] ED 8/30/22 with stroke symptoms of left-sided partial hemiparesis and mild dysarthria/expressive aphasia on morning of, onset was the day prior. TPA/ thrombectomy not done due to unknown last normal, and lack of large vessel occlusion only 30% stenosis of right ICA on CTA.     Brain MRI showed ischemic stroke with a small cluster of recent infarcts in the right frontal lobe and right basal ganglia.     She also tested positive for asymptomatic COVID infection on admission.     Dr. Flowers consulted and performed FNA on 9/2/22.  Pathology results pending but very concerning for cancer.  Suspicious breast mass which has grown quickly and has begun to bleed and is causing skin compromise.      9/2/22 - FNA  LEFT BREAST MASS, UPPER INNER QUADRANT, FINE-NEEDLE ASPIRATION:  -DUCTAL CARCINOMA (SEE COMMENT)  A. ESTROGEN AND PROGESTERONE RECEPTOR: POSITIVE (SEE MICROSCOPIC DESCRIPTION)  B. HER2: EQUIVOCAL (SEE MICROSCOPIC DESCRIPTION AND COMMENT)  Adequacy:  Satisfactory for evaluation  Comment  The cytologic features and architectural features of the biopsy are consistent with a ductal carcinoma. It cannot be determined whether this represents in situ or invasive carcinoma. HER2 is often positive in DCIS. Clinical correlation is necessary. Repeat analysis on a tissue biopsy or excisional specimen is recommended. Reflex HER2 analysis by FISH will not be performed at this time, since we cannot determine whether this is an in situ or invasive tumor.    Dr. Flowers's documented patient is currently a very poor candidate for  surgery because of the size of the mass and because of her recent stroke. She needs neoadjuvant chemotherapy or hormone therapy with the hope of shrinking this down before doing any surgery.  This would also give some time from her stroke before going ahead with surgery.    Records Location: See Bookmarked material     Records Needed: Breast imaging for past 5 years (CE shows 3/8/11 as last but need to confirm)    Payor: [employer] / Plan: [employer] [place] [employer]ANTAGE / Product Type: HMO /     Patient resides in [place], [place].  She is admitted to [facility] Care has assessed left breast wound as follows:   Wound due to: Malignancy  Wound history/plan of care: pt reported having wound for 3 months to WOC and for 1 month to RN. Pt had ABD that was adherent to wound without any non adhesive contact layer in place.   Wound base: 100 % non-granular tissue     Palpation of the wound bed: normal      Drainage: copious     Description of drainage: bloody     Measurements (length x width x depth, in cm): ~3  x 3  x  0.1 cm hard to measure secondary to bleeding.      Tunneling: N/A     Undermining: N/A  Periwound skin: Intact      Color: normal and consistent with surrounding tissue      Temperature: normal   Odor: none  Pain: denies , none

## 2022-09-06 NOTE — CONSULTS
Social Work: Initial Assessment with Discharge Plan    Patient Name: Stephani Garcia  : 1955  Age: 67 year old  MRN: [id number]  Completed assessment with: Chart review and interview with patient   Admitted to ARU: 2022    Presenting Information   Date of SW assessment: 2022  Health Care Directive: Will bring in copy  Primary Health Care Agent: Self  Secondary Health Care Agent: Spouse, Mj Garcia (NOK)  [place], Aleksandar is living with them for a few months. Pt reported her brother-in-law is also living there right now. There are 1.5 steps to enter home with a railing on the left side and greater than 10 stairs in the home. Pt is able to stay on main level for all ADLs.  Previous Functional Status: Independent with all ADLs. Pt has not driven in at least 30 years and relies on family, Uber, etc for transportation needs.   DME available: None reported  Patient and family understanding of hospitalization: Pleasant and appropriate  Cultural/Language/Spiritual Considerations: Pt is a 66 y/o   female who is english speaking and does not have a Moravian identified.         Physical Health  Reason for admission: Per H&P, Stephani Garcia is a 67 year old left hand dominant female with PMHx Hypertension, Hyperlipidemia, vertigo, GILDA (not using CPAP). She presented to [facility] ED 22 with stroke symptoms of left-sided partial hemiparesis and mild dysarthria/expressive aphasia on morning of, onset was the day prior. TPA/ thrombectomy not done due to unknown last normal, and lack of large vessel occlusion only 30% stenosis of right ICA on CTA. Brain MRI showed ischemic stroke with a small cluster of recent infarcts in the right frontal lobe and right basal ganglia. She also tested positive for asymptomatic COVID infection on admission. Had a recent breast mass biopsy , with pathology results pending but very concerning for  cancer, this was following a notable suspicious breast mass which has grown quickly and has begun to bleed and is causing skin compromise . She was a very poor candidate for surgery because of the size of the mass and because of her recent stroke.    Provider Information   Primary Care Physician:System, Provider Not In --See face sheet for demographics.  ARU HUC will schedule PCP apt at discharge.   : None reported    Mental Health/Chemical Dependency:   Diagnosis: None reported. Pt made aware of ability to speak with therapist while here.  Alcohol/Tobacco/Narcotis: Alcohol use is occasional/seldom; no tobacco or other narcotics reported.  Support/Services in Place: N/A  Services Needed/Recommended: Supportive services available during ARU stay.  Sexuality/Intimacy: Not discussed     Support System  Marital Status:   Family support: , Mj and son, Aleksandar both are currently living with pt and provide support. Pt has another son who is also supportive but does not live in the [facility] so support is limited.   Other support available: None reported     Community Resources  Current in home services: None reported  Previous services: None reported    Financial/Employment/Education  Employment Status: Works for the [employer] as a .   Income Source: Wages/salary  Education: Not discussed  Financial Concerns: Pt was planning to retire soon but reports pt may need to retire sooner due to stroke and possible cancerous growth on breast. Pt wishes pt would have figured out financials sooner with this but reports spouse can assist pt as needed and that pt believes pt has information needed.  Insurance:[employer] [place]      Discharge Plan   Patient and family discharge goal: Home, pending progress.   Provided Education on discharge plan: Yes. Evaluations and discharge recommendations pending.   Patient agreeable to discharge plan:  Pending further discussion.  Evaluations and discharge recommendations pending.   Provided education and attained signature for Medicare IM and IRF Patient Rights and Privacy Information provided to patient : Yes  Provided patient with [place] [facility] Resources: Yes  Barriers to discharge: TBD    Discharge Recommendations   Disposition: See above   Transportation Needs: family/friends  Name of Transportation Company and Phone: N/A     Additional comments   Discharge TBD, ELOS 3 weeks    SW called pt to complete assessment due to COVID precautions.     SW will remain available and continue to follow as needs arise.     Please invite to Care Conference:  N/A at this time     COLUMBA Barger   [facility]

## 2022-09-06 NOTE — PHARMACY-MEDICATION REGIMEN REVIEW
Pharmacy Medication Regimen Review  Stephani Garcia is a 67 year old female who is currently in the Acute Rehab Unit.    Assessment: All medications have an appropriate indications, durations and no unnecessary use was found.    Plan:   Continue current treatment.  Attending provider will be sent this note for review.  If there are any emergent issues noted above, pharmacist will contact provider directly by phone.      Pharmacy will periodically review the resident's medication regimen for any PRN medications not administered in > 72 hours and discontinue them. The pharmacist will discuss gradual dose reductions of psychopharmacologic medications with interdisciplinary team on a regular basis.    Please contact pharmacy if the above does not answer specific medication questions/concerns.    Background:  A pharmacist has reviewed all medications and pertinent medical history today.  Medications were reviewed for appropriate use and any irregularities found are listed with recommendations.      Current Facility-Administered Medications:      acetaminophen (TYLENOL) tablet 650 mg, 650 mg, Oral, Q6H PRN, Estrada Kevin MD     amLODIPine (NORVASC) tablet 5 mg, 5 mg, Oral, Daily, Estrada Kevin MD, 5 mg at 09/06/22 0646     aspirin EC tablet 81 mg, 81 mg, Oral, Daily, Estrada Kevin MD, 81 mg at 09/06/22 0849     atorvastatin (LIPITOR) tablet 80 mg, 80 mg, Oral, QPM, Estrada Kevin MD, 80 mg at 09/05/22 2037     clopidogrel (PLAVIX) tablet 75 mg, 75 mg, Oral, Daily, Estrada Kevin MD, 75 mg at 09/06/22 0849     heparin ANTICOAGULANT injection 5,000 Units, 5,000 Units, Subcutaneous, Q12H, Estrada Kevin MD, 5,000 Units at 09/06/22 1341     hydrALAZINE (APRESOLINE) tablet 10 mg, 10 mg, Oral, Q8H PRN, Estrada Kevin MD     lisinopril (ZESTRIL) tablet 20 mg, 20 mg, Oral, At Bedtime, Estrada Kevin MD, 20 mg at 09/05/22 2037     melatonin tablet 3 mg, 3 mg, Oral, At Bedtime, Estrada Kevin MD     pantoprazole  (PROTONIX) EC tablet 40 mg, 40 mg, Oral, QAM AC, Estrada Kevin MD, 40 mg at 09/06/22 0644     Patient is already receiving anticoagulation with heparin, enoxaparin (LOVENOX), warfarin (COUMADIN)  or other anticoagulant medication, , Does not apply, Continuous, Estrada Kevin MD     polyethylene glycol (MIRALAX) Packet 17 g, 17 g, Oral, Daily PRN, Estrada Kevin MD     senna-docusate (SENOKOT-S/PERICOLACE) 8.6-50 MG per tablet 1-4 tablet, 1-4 tablet, Oral, BID PRN, Estrada Kevin MD  No current outpatient prescriptions on file.   PMH: Acute ischemic stroke, Recent infarcts in the right frontal lobe and right basal ganglia, Asymptomatic COVID 19 Infection, Hypertension, Hyperlipidemia, vertigo, GILDA (not using CPAP), Left Breast mass and Neurogenic Bladder

## 2022-09-07 ENCOUNTER — APPOINTMENT (OUTPATIENT)
Dept: SPEECH THERAPY | Facility: CLINIC | Age: 67
DRG: 056 | End: 2022-09-07
Attending: PHYSICAL MEDICINE & REHABILITATION
Payer: COMMERCIAL

## 2022-09-07 ENCOUNTER — APPOINTMENT (OUTPATIENT)
Dept: PHYSICAL THERAPY | Facility: CLINIC | Age: 67
DRG: 056 | End: 2022-09-07
Attending: PHYSICAL MEDICINE & REHABILITATION
Payer: COMMERCIAL

## 2022-09-07 ENCOUNTER — APPOINTMENT (OUTPATIENT)
Dept: OCCUPATIONAL THERAPY | Facility: CLINIC | Age: 67
DRG: 056 | End: 2022-09-07
Attending: PHYSICAL MEDICINE & REHABILITATION
Payer: COMMERCIAL

## 2022-09-07 LAB
PATH REPORT.COMMENTS IMP SPEC: ABNORMAL
PATH REPORT.COMMENTS IMP SPEC: ABNORMAL
PATH REPORT.COMMENTS IMP SPEC: YES
PATH REPORT.FINAL DX SPEC: ABNORMAL
PATH REPORT.GROSS SPEC: ABNORMAL
PATH REPORT.MICROSCOPIC SPEC OTHER STN: ABNORMAL
PATH REPORT.RELEVANT HX SPEC: ABNORMAL

## 2022-09-07 PROCEDURE — 97530 THERAPEUTIC ACTIVITIES: CPT | Mod: GP

## 2022-09-07 PROCEDURE — 250N000013 HC RX MED GY IP 250 OP 250 PS 637

## 2022-09-07 PROCEDURE — 92526 ORAL FUNCTION THERAPY: CPT | Mod: GN | Performed by: SPEECH-LANGUAGE PATHOLOGIST

## 2022-09-07 PROCEDURE — 250N000011 HC RX IP 250 OP 636

## 2022-09-07 PROCEDURE — 97535 SELF CARE MNGMENT TRAINING: CPT | Mod: GO

## 2022-09-07 PROCEDURE — 92507 TX SP LANG VOICE COMM INDIV: CPT | Mod: GN | Performed by: SPEECH-LANGUAGE PATHOLOGIST

## 2022-09-07 PROCEDURE — 99232 SBSQ HOSP IP/OBS MODERATE 35: CPT | Mod: CS | Performed by: PHYSICAL MEDICINE & REHABILITATION

## 2022-09-07 PROCEDURE — 128N000003 HC R&B REHAB

## 2022-09-07 RX ORDER — HYDRALAZINE HYDROCHLORIDE 25 MG/1
25 TABLET, FILM COATED ORAL EVERY 8 HOURS PRN
Status: DISCONTINUED | OUTPATIENT
Start: 2022-09-07 | End: 2022-09-07

## 2022-09-07 RX ORDER — HYDRALAZINE HYDROCHLORIDE 10 MG/1
10 TABLET, FILM COATED ORAL EVERY 8 HOURS PRN
Status: DISCONTINUED | OUTPATIENT
Start: 2022-09-07 | End: 2022-09-30 | Stop reason: HOSPADM

## 2022-09-07 RX ADMIN — LISINOPRIL 20 MG: 20 TABLET ORAL at 20:29

## 2022-09-07 RX ADMIN — CLOPIDOGREL BISULFATE 75 MG: 75 TABLET, FILM COATED ORAL at 09:44

## 2022-09-07 RX ADMIN — ASPIRIN 81 MG: 81 TABLET, COATED ORAL at 09:44

## 2022-09-07 RX ADMIN — MELATONIN TAB 3 MG 3 MG: 3 TAB at 20:29

## 2022-09-07 RX ADMIN — HEPARIN SODIUM 5000 UNITS: 5000 INJECTION, SOLUTION INTRAVENOUS; SUBCUTANEOUS at 09:44

## 2022-09-07 RX ADMIN — ACETAMINOPHEN 650 MG: 325 TABLET, FILM COATED ORAL at 09:44

## 2022-09-07 RX ADMIN — PANTOPRAZOLE SODIUM 40 MG: 40 TABLET, DELAYED RELEASE ORAL at 04:47

## 2022-09-07 RX ADMIN — AMLODIPINE BESYLATE 5 MG: 5 TABLET ORAL at 09:44

## 2022-09-07 RX ADMIN — HEPARIN SODIUM 5000 UNITS: 5000 INJECTION, SOLUTION INTRAVENOUS; SUBCUTANEOUS at 20:29

## 2022-09-07 RX ADMIN — ATORVASTATIN CALCIUM 80 MG: 80 TABLET, FILM COATED ORAL at 20:29

## 2022-09-07 ASSESSMENT — ACTIVITIES OF DAILY LIVING (ADL)
ADLS_ACUITY_SCORE: 44
ADLS_ACUITY_SCORE: 48
ADLS_ACUITY_SCORE: 44
ADLS_ACUITY_SCORE: 44
ADLS_ACUITY_SCORE: 48

## 2022-09-07 NOTE — PLAN OF CARE
Goal Outcome Evaluation:    Plan of Care Reviewed With: patient     Overall Patient Progress: no change    Outcome Evaluation: L breast dressing CDI, due to be changed tomorrow 9/7. Mepilex to sacrum due to be changed 9/9, also CDI. Remains incontinent of bladder, did not call before or after urinating - needs anticipated. Reports no pain at this time. BP remains elevated, but under PRN parameters.

## 2022-09-07 NOTE — PLAN OF CARE
"Discharge Planner Post-Acute Rehab PT:      Discharge Plan: Home with  assist, 1 DAVE without rail. Home care vs OP PT, pending progress.     Precautions: Falls, L hemiplegia, dysphagia, possible breast CA, Covid special precautions     Current Status:  Bed Mobility: CGA supine<>sit to R side of bed, ModA to L side of bed  Transfer: Ax2 Lea Giradlo with nursing with L eunice-neurexa sling. Squat pivot with therapies only- Min-ModA R direction bed<>WC  Gait: Unsafe  Stairs: Unsafe  Balance: Fair static/dynamic seated balance, SBA with LOB to the L.  PASS on 9/6: 13/36     Assessment:  Focused session on  progressing functional transfers with nursing and with PT. Approved for Ax2 Lea Giraldo transfers with nursing, with use of eunice-neurexa sling on L UE. Able to perform 2x bed>WC and 2x WC>bed squat pivot transfer to the R with Min-ModA for bed>WC pending fatigue, ModA for WC>bed due to \"uphill\" transfer.     Other Barriers to Discharge (DME, Family Training, etc):   Equipment: Anticipate K4 WC, quad cane, gait belt, L Eunice Neurexa  Family training to be scheduled.            "

## 2022-09-07 NOTE — PROGRESS NOTES
"  [facility]   Acute Rehabilitation Unit  Daily progress note    INTERVAL HISTORY  Stephani Garcia was seen and examined at bedside this afternoon.  During day BP running better, but increase this afternoon, prn hydralazine ordered.  Results of biopsy returned, [facility] to review with patient this evening.  Patient denies chest pain, shortness of breath, no fever or chills.  Feels strength has improved from day prior, but still no functional changes with it.       Functional  OT:  ADLs:    Mobility: Ax1 SaraStedy with therapies only.  Ax2 liko with nsg.    Grooming: SBA EOB wash face, Max A wash hands     Dressing: UB Max A pull over long sleeve shirt, Max A shorts, Min A socks with LLE figure 4 technique and jong technique using RUE    Bathing: Max A sponge bathing     Toileting: Bed pan with nsg. Simulated Ax1 SaraStedy > OTC.  IADLs: IND PTA. Anticipate A at discharge.  Vision/Cognition: Strabismus baseline. L inattention noted in functional tasks. Functional deficits problem solving, sequencing, and wordfinding.        ROS: 10 point ROS neg other than the symptoms noted above in the HPI.      MEDICATIONS    amLODIPine  5 mg Oral Daily     aspirin  81 mg Oral Daily     atorvastatin  80 mg Oral QPM     clopidogrel  75 mg Oral Daily     heparin ANTICOAGULANT  5,000 Units Subcutaneous Q12H     lisinopril  20 mg Oral At Bedtime     melatonin  3 mg Oral At Bedtime     pantoprazole  40 mg Oral QAM AC        acetaminophen, hydrALAZINE, polyethylene glycol, senna-docusate     PHYSICAL EXAM  BP (!) 181/97 (BP Location: Right arm)   Pulse 96   Temp (!) 96.7  F (35.9  C) (Oral)   Resp 16   Ht 1.702 m (5' 7\")   Wt 77.7 kg (171 lb 3.2 oz)   SpO2 97%   BMI 26.81 kg/m    General: up at side of bed, no distress   HEENT: EOMI (strabismus in L. Eye), NC/NT, Moist mucous membranes  Pulmonary: Breathing comfortably on room air,symmetrical chest rise  Cardiovascular: 2+ " "radial pulse, well perfused   Abdominal: Non-tender, non-distended  Extremities: warm, well perfused, no edema in bilateral lower extremities, no tenderness in calves, power unchanged with L. Hemiparesis.   Neuro/MSK: Alert and oriented, face symmetric, Dysarthric.    LABS  No results found for this or any previous visit (from the past 24 hour(s)).    Rehabilitation - continue comprehensive acute inpatient rehabilitation program with multidisciplinary approach including therapies, rehab nursing, and physiatry following. See interval history for updates.      ASSESSMENT AND PLAN  # Acute ischemic stroke   # Recent infarcts in the right frontal lobe and right basal ganglia  Noted left-sided partial hemiparesis and mild dysarthria/expressive aphasia at presentation 8/30. MRI confirmed  Acute ischemic stroke, and TPA/ thrombectomy 2/2 unknown last known normal. CTA showed no large vessel occlusion, with only 30% of ICA occlusion. Echo was normal EF 65% on 8/31. No Afib noted with telemetry.  at baseline. BP goal is <140/90 at rest, anticipate increase with therapies.   - Continue Plavix 75 mg and aspirin 81 mg for 90 days then switch to ASA 325mg mono therapy  - Continue Atorvastatin 80 mg at bedtime.   - Needs PT/OT/SLP evaluation       # Dysphagia    Noted during Video swallow study on 9/2/22: \"Patient had direct, silent aspiration with mildly thick liquid on first trial and deep penetration with mildly thick liquids on subsequent trials. No aspiration or penetration with moderately thick liquids.   - Continue dysphagia diet with moderately thick fluids  - SLP to evaluate     # Left Breast mass  Has had a FNA 9/2 by surgery. There is a high suspicion for breast CA. Surgery rec'd neoadjuvent treatment with hopes of shrinking tumor prior to surgery. Unable to surgically operated due to stroke at the moment.   - Needs referral to oncology  - [facility]  - WOCN consult: appreciate recs " 9/6      # Hyperlipidemia    at baseline.  - statin high-dose as above     # Hypertension  Was allowed permissive HTN, first 24 hours but started to bring it down slowly. Slowly decrease BP, with goal to keep <140/90 at rest.  - Continue lisinopril 20 mg daily  - Started Amlodipine 5 mg Daily 9/6  - Started Hydralazine 10 mg PRN 9/6  - Monitor BP anticipate some increases during the therapy sessions.       #GILDA (obstructive sleep apnea):  Patient does not use CPAP at baseline. May need repeat sleep study and eval by sleep medicine team.   - encourage using CPAP and call RT for trial     # Asymptomatic COVID 19 Infection    Positive Covid test on 8/3 with preceding cold symptoms for 3 days prior. Currently she continues remain asymptomatic from COVID symptoms on 9/5. She has no cough, wheezing, SOB, fever, chills, body aches or any type of cold symptoms.  She has completed remdesivir x 3 days.  - Could possibly come off quarantine 9/9 if Covid test is neg.     # Neurogenic Bladder  Using diaper 2/2 incontinence. Discussed with her on options to do timed voids during the stay.  - Consider doing timed voids   - Check PVR's initially for any retention.       # Bowel   Noted mild loose stools 9/4. Has no had BM as of this morning.  - Start PRN Bowel meds with Senna and Miralax. Will observe constipation.     # Sleep adjustment  - Added Melatonin 3 mg for sleep      6. Adjustment to disability:  Clinical psychology to eval and treat. Consult sent 9/6.   7. FEN: Regular Diet Adult Liquidized/Moderately Thick (level 3)  8. Bowel: PRN Senna and Miralax  9. Bladder: Pending PVR review, mgith need a scheduled bladder program  10. DVT Prophylaxis: Heparin 5000 units for DVT ppx. Plavix 75 mg daily and ASA 81 mg 90 days then ASA alone.   11. GI Prophylaxis: Pantoprazole 40 mg   12. Code: Full  13. Disposition: Home  14. ELOS:  21 days.  15. Rehab prognosis:  Fair  16. Follow up Appointments on Discharge:   - PCP follow up  7-14 days post discharge  - Follow-up with neurology in 6 to 8 weeks  - Referral for oncology given biopsy results   - GILDA: outpatient referral to sleep medicine clinic if agreeable       Andrew Marcos,   Physical Medicine and Rehabilitation  [facility]      I spent a total of 25 minutes face to face and coordinating care of Stephani Garcia. Over 50% of my time on the unit was spent counseling the patient and /or coordinating care regarding CVA.

## 2022-09-07 NOTE — PLAN OF CARE
Goal Outcome Evaluation:    Plan of Care Reviewed With: patient     Overall Patient Progress: no change    Outcome Evaluation: Patient assessments today. BP remains elevated, PRN HTN medication added today by providers.    Orientation: Alert and oriented x4  Bowel: LBM 9/6 Continent and incontinent  Bladder: Continent and incontinent, uses Purewick overnight.  Pain: Generalized discomfort in seat- acetaminophen given x1 with good relief.  Ambulation/Transfers: Assist of 2 with Liko Lift  Diet/ Liquids: Regular diet with moderately thick liquids. Takes pills whole.  Skin: Coccyx with Mepilex, breast wound covered, bruises on arm, shoulder, and abdomen.

## 2022-09-07 NOTE — PROGRESS NOTES
Discharge Planner Post-Acute Rehab OT:     Discharge Plan: home with spouse A, HC OT vs OP OT pending progress    Precautions: falls, covid + special precautions    Current Status:  ADLs:    Mobility: Ax1 SaraStedy with therapies only.  Ax2 liko with nsg.    Grooming: SBA EOB wash face, Max A wash hands     Dressing: UB Max A pull over long sleeve shirt, Max A shorts, Min A socks with LLE figure 4 technique and jong technique using RUE    Bathing: Max A sponge bathing     Toileting: Bed pan with nsg. Simulated Ax1 SaraStedy > OTC.  IADLs: IND PTA. Anticipate A at discharge.  Vision/Cognition: Strabismus baseline. L inattention noted in functional tasks. Functional deficits problem solving, sequencing, and wordfinding.    Assessment:  Initial bathing and dressing assessed, current ADL status updated.     Other Barriers to Discharge (DME, Family Training, etc): All needs met on main level.     DME:    Family training - Not scheduled as of 9/6/22.

## 2022-09-07 NOTE — PROGRESS NOTES
Telephoneed patient's phone at [phone].  Her son Aleksandar answered and indicated patient has her mobile phone with her at [facility], but she does not recall password.  Informed him I would be calling Stephani to share results of breast FNA.  We did not discuss specific details at that time.    Telephoned ARU in hopes of speaking with patient on her room phone. Patient was currently on the line. Writer will call unit at [phone] in a few minutes to try to connect again.    9/7 15:50 - Telephoned ARU at [phone] and was transferred to patient's room phone. Confirmed patient's name and date of birth and informed her the results from the fine needle aspiration performed on 9/2/22 are now available.  She indicated she wanted to hear these and writer shared information with her. 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  Due to her stroke she is not a candidate for surgery right now.  Stephani indicates her focus is recovering from stroke and progressing in rehabilitation.      A referral for outpatient medical oncology consult was placed at the time of her hospital discharge, but she will be in rehab for at least 2-3 weeks yet. Informed Stephani her rehab physician may consider placing a referral as well to see if she could be consulted by oncology PRIOR to discharge for consideration of next steps.      Emotional support and encouragement offered and calls welcomed.  Patient was not able to record my direct number but staff may share it with her as [phone].    9/2/22   Final Diagnosis  Specimen A  Interpretation:  Positive for malignancy  LEFT BREAST MASS, UPPER INNER QUADRANT, FINE-NEEDLE ASPIRATION:  -DUCTAL CARCINOMA (SEE COMMENT)  A. ESTROGEN AND PROGESTERONE RECEPTOR: POSITIVE (SEE MICROSCOPIC DESCRIPTION)  B. HER2: EQUIVOCAL (SEE MICROSCOPIC DESCRIPTION AND COMMENT)  Adequacy:  Satisfactory for evaluation  Electronically signed by Coni Birmingham MD on 9/7/2022 at 1:42  PM  .  Comment  The cytologic features and architectural features of the biopsy are consistent with a ductal carcinoma. It cannot be determined  whether this represents in situ or invasive carcinoma. HER2 is often positive in DCIS. Clinical correlation is necessary. Repeat  analysis on a tissue biopsy or excisional specimen is recommended. Reflex HER2 analysis by FISH will not be performed at this time,  since we cannot determine whether this is an in situ or invasive tumor.  Dr. Mayito Goel concurs with the diagnosis and stain interpretation.  The specimen was collected in CytoRich Red. A cellblock was prepared and further fixed in formalin. Total formalin fixation time is  slightly less than the optimal 6-hour fixation time. Prognostic marker results should be interpreted accordingly.  Clinical Information  Large Breast Mass. Establish diagnosis. Need ER/RI and Her2 if possible.    Hailey Torres RN, OCN  Cancer Care Navigator  [phone]

## 2022-09-07 NOTE — PROGRESS NOTES
Pt made request that SW make referral to [facility] for her stroke to provide one-on-one support and resources. SW made referral online via [facility] website.     COLUMBA Barger  [facility]   [facility]

## 2022-09-07 NOTE — PROGRESS NOTES
Individualized Overall Plan Of Care (IOPOC)      Rehab diagnosis/Impairment Group Code: Stroke ischemic 01.1 (l) body involvement (r) brain: small cluster of recent infarcts in the right frontal lobe and right basal ganglia  Infarction of right basal ganglia (h)       Expected functional outcome: reach a level of mod I     Clinical Impression Comments: patient with L hemiparesis post CVA    Mobility: Pt presents to PT with significant L hemiparesis with impaired sitting/standing balance and notable cognitive impairments s/p R frontal and basal ganglia CVA 8/30/22. As a result, pt is requiring significantly increased level of assist for functional mobility and at increaed risk for falls above baseline. She should benefit well from skilled PT, with goals for SBA WC-based mobility at discharge. ELOS 3-4 weeks.    ADL: OT: Pt presents to OT with significantly decreased independence and safety in ADLs and functional mobility s/p CVA. Performance limited by L hemiplegia and functional cognition deficits in context of covid 19 + special precautions. Goals to progress to SBA-min A ADLs with A from family at discharge. ELOS 3-4 weeks.    Communication/Cognition/Swallow: SLP: Pt seen on ARU for cognitive linguistic assessment following recent R ischemic CVA. Pt endorses increased difficulty with word finding and further changes to cognition. Pt given SCCAN, see below for details. Pt overall presents with mild-mod cognitive impairments with deficits re: language, attention, memory, and reasoning. 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 Pt demonstrates below baseline level of function and would benefit from skilled SLP to treat above listed impairmetns and improve overall functional level of IND.     Intensity of therapy:   PT 60 minutes, Daily, for 16 days   OT 60 minutes, Daily, for 16 days   SLP 60 minutes, daily, for 16 days     Orthotics AFO  Education stroke  Neuropsychology Testing:  No        Medical Prognosis: good     Physician summary statement: patient with L hemiparesis post CVA, goal is to reach a level of mod I     Discharge destination: prior home  Discharge rehabilitation needs: home care, RN, PT, OT and SLP      Estimated length of stay: 16 days       Rehabilitation Physician Andrew Marcos DO

## 2022-09-07 NOTE — PLAN OF CARE
"Discharge Planner Post-Acute Rehab SLP:     Discharge Plan: Ongoing SLP, HH vs OP ?    Precautions: COVID, aspiration     Current Status:  Communication: Word finding difficulties in conversation   Cognition: Mild-mod cognitive impairments with deficits re: memory, attention, reasoning, language. Benefits from extra time   Swallow: Regular, moderately thick (3) liquid. Upright fully all PO. Medications given as tolerated with 3 liquid or puree.     Assessment:  Pt seen with regular (salad, turkey and cheese sandwich) with moderately thick (3) liquids. Pt demo'd slow but thorough mastication of solids, when queried reported she is \"going slow\" so she doesn't bite left inside lip. Pt performed lingual sweep to clear left lateral sulci of solid residue. Pt consume sips 3 liquid via cup edge with small, single sips. No overt s/sx of aspiration/airway penetration with curren diet texture regular/3.    P.M.: Pt trained in circumlocution word finding strategy with use of Semantic Feature Analysis map as visual aide. Facilitated structured practice opportunities, pt completed 3x with mild cueing from SLP.    Other Barriers to Discharge (Family Training, etc): family training education re: thickened liquids (pending improvement) and IADL support   "

## 2022-09-07 NOTE — PLAN OF CARE
FOCUS/GOAL  Bladder management and Medical management    ASSESSMENT, INTERVENTIONS AND CONTINUING PLAN FOR GOAL:    Patient is offered the bedpan while awake. No purewick on tonight. Claimed is able to feel the urge and knows when to void however was incontinent of bladder and had a smear of bm. Encouraged to call for assistance. Is alert and oriented x4 with word difficulty finding. Min A2 in bed mobility, A2 liko in transfers. Motor responses no changes. BP elevated but not within PRN bp medication parameter, asymptomatic. Slept good per pt, melatonin worked. Will continue poc.    Goal Outcome Evaluation:

## 2022-09-08 ENCOUNTER — APPOINTMENT (OUTPATIENT)
Dept: PHYSICAL THERAPY | Facility: CLINIC | Age: 67
DRG: 056 | End: 2022-09-08
Attending: PHYSICAL MEDICINE & REHABILITATION
Payer: COMMERCIAL

## 2022-09-08 ENCOUNTER — APPOINTMENT (OUTPATIENT)
Dept: SPEECH THERAPY | Facility: CLINIC | Age: 67
DRG: 056 | End: 2022-09-08
Attending: PHYSICAL MEDICINE & REHABILITATION
Payer: COMMERCIAL

## 2022-09-08 ENCOUNTER — APPOINTMENT (OUTPATIENT)
Dept: OCCUPATIONAL THERAPY | Facility: CLINIC | Age: 67
DRG: 056 | End: 2022-09-08
Attending: PHYSICAL MEDICINE & REHABILITATION
Payer: COMMERCIAL

## 2022-09-08 LAB — PLATELET # BLD AUTO: 290 10E3/UL (ref 150–450)

## 2022-09-08 PROCEDURE — 97110 THERAPEUTIC EXERCISES: CPT | Mod: GP | Performed by: PHYSICAL THERAPIST

## 2022-09-08 PROCEDURE — 250N000011 HC RX IP 250 OP 636

## 2022-09-08 PROCEDURE — 97530 THERAPEUTIC ACTIVITIES: CPT | Mod: GP | Performed by: PHYSICAL THERAPIST

## 2022-09-08 PROCEDURE — 97129 THER IVNTJ 1ST 15 MIN: CPT | Mod: GN

## 2022-09-08 PROCEDURE — 128N000003 HC R&B REHAB

## 2022-09-08 PROCEDURE — 99232 SBSQ HOSP IP/OBS MODERATE 35: CPT | Mod: CS | Performed by: PHYSICAL MEDICINE & REHABILITATION

## 2022-09-08 PROCEDURE — 250N000013 HC RX MED GY IP 250 OP 250 PS 637

## 2022-09-08 PROCEDURE — 97112 NEUROMUSCULAR REEDUCATION: CPT | Mod: GP | Performed by: PHYSICAL THERAPIST

## 2022-09-08 PROCEDURE — 97130 THER IVNTJ EA ADDL 15 MIN: CPT | Mod: GN

## 2022-09-08 PROCEDURE — 85049 AUTOMATED PLATELET COUNT: CPT

## 2022-09-08 PROCEDURE — 97112 NEUROMUSCULAR REEDUCATION: CPT | Mod: GO

## 2022-09-08 PROCEDURE — 250N000013 HC RX MED GY IP 250 OP 250 PS 637: Performed by: PHYSICAL MEDICINE & REHABILITATION

## 2022-09-08 PROCEDURE — 97535 SELF CARE MNGMENT TRAINING: CPT | Mod: GO

## 2022-09-08 PROCEDURE — 36415 COLL VENOUS BLD VENIPUNCTURE: CPT

## 2022-09-08 RX ADMIN — HYDRALAZINE HYDROCHLORIDE 10 MG: 10 TABLET ORAL at 15:41

## 2022-09-08 RX ADMIN — CLOPIDOGREL BISULFATE 75 MG: 75 TABLET, FILM COATED ORAL at 09:22

## 2022-09-08 RX ADMIN — LISINOPRIL 20 MG: 20 TABLET ORAL at 20:39

## 2022-09-08 RX ADMIN — AMLODIPINE BESYLATE 5 MG: 5 TABLET ORAL at 09:22

## 2022-09-08 RX ADMIN — Medication 5 MG: at 20:38

## 2022-09-08 RX ADMIN — HEPARIN SODIUM 5000 UNITS: 5000 INJECTION, SOLUTION INTRAVENOUS; SUBCUTANEOUS at 09:22

## 2022-09-08 RX ADMIN — ACETAMINOPHEN 650 MG: 325 TABLET, FILM COATED ORAL at 02:07

## 2022-09-08 RX ADMIN — PANTOPRAZOLE SODIUM 40 MG: 40 TABLET, DELAYED RELEASE ORAL at 06:05

## 2022-09-08 RX ADMIN — HEPARIN SODIUM 5000 UNITS: 5000 INJECTION, SOLUTION INTRAVENOUS; SUBCUTANEOUS at 20:41

## 2022-09-08 RX ADMIN — ATORVASTATIN CALCIUM 80 MG: 80 TABLET, FILM COATED ORAL at 20:38

## 2022-09-08 RX ADMIN — ASPIRIN 81 MG: 81 TABLET, COATED ORAL at 09:22

## 2022-09-08 ASSESSMENT — ACTIVITIES OF DAILY LIVING (ADL)
ADLS_ACUITY_SCORE: 44

## 2022-09-08 NOTE — PROGRESS NOTES
"  [facility]   Acute Rehabilitation Unit  Daily progress note    INTERVAL HISTORY  Stephani Garcia was seen and examined at bedside this morning.  No acute events overnight.  Results from biopsy discussed with Cancer Team.  Patient denies chest pain, shortness of breath, no fever or chills.  Hopes to get off of Covid precautions so can exercise in gym.  Poor sleep overnight will add prn melatonin.     Functional  PT:  Bed Mobility: Min A supine<>sit for trunk and L LE management  Transfer: Ax2 Lea Giraldo with nursing. Squat pivot with therapies only- Min-ModA to the R  Gait: Unsafe  Stairs: Unsafe  Balance: Fair static/dynamic seated balance- LOB to the L primarily  PASS on 9/6: 13/36           ROS: 10 point ROS neg other than the symptoms noted above in the HPI.      MEDICATIONS    amLODIPine  5 mg Oral Daily     aspirin  81 mg Oral Daily     atorvastatin  80 mg Oral QPM     clopidogrel  75 mg Oral Daily     heparin ANTICOAGULANT  5,000 Units Subcutaneous Q12H     lisinopril  20 mg Oral At Bedtime     melatonin  3 mg Oral At Bedtime     pantoprazole  40 mg Oral QAM AC        acetaminophen, hydrALAZINE, polyethylene glycol, senna-docusate     PHYSICAL EXAM  BP (!) 151/76 (BP Location: Right arm)   Pulse 90   Temp 96.8  F (36  C) (Oral)   Resp 18   Ht 1.702 m (5' 7\")   Wt 77.7 kg (171 lb 3.2 oz)   SpO2 95%   BMI 26.81 kg/m    General: up at side of bed, no distress   HEENT: EOMI (strabismus in L. Eye), NC/NT, Moist mucous membranes  Pulmonary: Breathing comfortably on room air,symmetrical chest rise  Cardiovascular: 2+ radial pulse, well perfused   Abdominal: Non-tender, non-distended  Extremities: warm, well perfused, no edema in bilateral lower extremities, no tenderness in calves, power unchanged with L. Hemiparesis.   Neuro/MSK: Alert and oriented, face symmetric, Dysarthric.    LABS  Recent Results (from the past 24 hour(s))   Platelet count    Collection Time: " "09/08/22  6:00 AM   Result Value Ref Range    Platelet Count 290 150 - 450 10e3/uL       Rehabilitation - continue comprehensive acute inpatient rehabilitation program with multidisciplinary approach including therapies, rehab nursing, and physiatry following. See interval history for updates.      ASSESSMENT AND PLAN  # Acute ischemic stroke   # Recent infarcts in the right frontal lobe and right basal ganglia  Noted left-sided partial hemiparesis and mild dysarthria/expressive aphasia at presentation 8/30. MRI confirmed  Acute ischemic stroke, and TPA/ thrombectomy 2/2 unknown last known normal. CTA showed no large vessel occlusion, with only 30% of ICA occlusion. Echo was normal EF 65% on 8/31. No Afib noted with telemetry.  at baseline. BP goal is <140/90 at rest, anticipate increase with therapies.   - Continue Plavix 75 mg and aspirin 81 mg for 90 days then switch to ASA 325mg mono therapy  - Continue Atorvastatin 80 mg at bedtime.   - Needs PT/OT/SLP evaluation       # Dysphagia    Noted during Video swallow study on 9/2/22: \"Patient had direct, silent aspiration with mildly thick liquid on first trial and deep penetration with mildly thick liquids on subsequent trials. No aspiration or penetration with moderately thick liquids.   - Continue dysphagia diet with moderately thick fluids  - SLP to evaluate     # Left Breast mass  Has had a FNA 9/2 by surgery. There is a high suspicion for breast CA. Surgery rec'd neoadjuvent treatment with hopes of shrinking tumor prior to surgery. Unable to surgically operated due to stroke at the moment.   - Needs referral to oncology for outpatient follow-up.  --9/7 biopsy results: ductal carcinoma and estrogen and progesterone positive.  Due to her stroke she is not a candidate for surgery right now.    - Follow up [facility] per [facility]  - WOCN consult: appreciate recs 9/6      # Hyperlipidemia    at baseline.  - statin high-dose as above     # " Hypertension  Was allowed permissive HTN, first 24 hours but started to bring it down slowly. Slowly decrease BP, with goal to keep <140/90 at rest.  - Continue lisinopril 20 mg daily  - Started Amlodipine 5 mg Daily 9/6  - Started Hydralazine 10 mg PRN 9/6  - Monitor BP anticipate some increases during the therapy sessions.       #GILDA (obstructive sleep apnea):  Patient does not use CPAP at baseline. May need repeat sleep study and eval by sleep medicine team.   - encourage using CPAP and call RT for trial     # Asymptomatic COVID 19 Infection    Positive Covid test on 8/3 with preceding cold symptoms for 3 days prior. Currently she continues remain asymptomatic from COVID symptoms on 9/5. She has no cough, wheezing, SOB, fever, chills, body aches or any type of cold symptoms.  She has completed remdesivir x 3 days.  - Could possibly come off quarantine 9/9 if Covid test is neg.     # Neurogenic Bladder  Using diaper 2/2 incontinence. Discussed with her on options to do timed voids during the stay.  - Consider doing timed voids   - Check PVR's initially for any retention.       # Bowel   Noted mild loose stools 9/4. Has no had BM as of this morning.  - Start PRN Bowel meds with Senna and Miralax. Will observe constipation.     # Sleep adjustment  - Added Melatonin 3 mg for sleep      6. Adjustment to disability:  Clinical psychology to eval and treat. Consult sent 9/6.   7. FEN: Regular Diet Adult Liquidized/Moderately Thick (level 3)  8. Bowel: PRN Senna and Miralax  9. Bladder: Pending PVR review, mgith need a scheduled bladder program  10. DVT Prophylaxis: Heparin 5000 units for DVT ppx. Plavix 75 mg daily and ASA 81 mg 90 days then ASA alone.   11. GI Prophylaxis: Pantoprazole 40 mg   12. Code: Full  13. Disposition: Home  14. ELOS:  21 days.  15. Rehab prognosis:  Fair  16. Follow up Appointments on Discharge:   - PCP follow up 7-14 days post discharge  - Follow-up with neurology in 6 to 8 weeks  - Referral  for oncology given biopsy results   - GILDA: outpatient referral to sleep medicine clinic if agreeable       Andrew Marcos,   Physical Medicine and Rehabilitation  [facility]      I spent a total of 25 minutes face to face and coordinating care of Stephani Garcia. Over 50% of my time on the unit was spent counseling the patient and /or coordinating care regarding CVA.

## 2022-09-08 NOTE — PLAN OF CARE
FOCUS/GOAL  Medical management    ASSESSMENT, INTERVENTIONS AND CONTINUING PLAN FOR GOAL:  Patient alert and oriented, able to make needs known  Calls appropriately  Patient complained of discomfort and had difficulty to fall asleep, PRN Tylenol given, No PRN sleep aide available.  Denied chest pain, headache, numbness and tingling, no N&V, no SOB  Mix continence of Bladder, was checked and changed this shift, had episode of incontinence of Bladder, No  BM this shift  Safety rounding checked completed, 3 side rails UP, bed alarm ON, call light in reach  May continue with POC.   Goal Outcome Evaluation:

## 2022-09-08 NOTE — PLAN OF CARE
Discharge Planner Post-Acute Rehab PT:      Discharge Plan: Home with  assist, 1 DAVE without rail. Home care vs OP PT, pending progress.     Precautions: Falls, L hemiplegia, dysphagia, possible breast CA, Covid special precautions     Current Status:  Bed Mobility: Min A supine<>sit for trunk and L LE management  Transfer: Ax2 Lea Giraldo with nursing. Squat pivot with therapies only- Min-ModA to the R  Gait: Unsafe  Stairs: Unsafe  Balance: Fair static/dynamic seated balance- LOB to the L primarily  PASS on 9/6: 13/36     Assessment:  Demonstrating mildly improving activation of L LE through performance of supine ex and standing WS/pre-gait activities holding onto bedrail with R UE. Plan to continue to monitor BP, as noted to be high in initial AM session, improved to 160/78 in 2nd with BP medications on board.     Other Barriers to Discharge (DME, Family Training, etc):   Equipment: Anticipate K4 WC, quad cane, gait belt, L Eunice Neurexa  Family training to be scheduled.

## 2022-09-08 NOTE — PLAN OF CARE
Goal Outcome Evaluation:    Plan of Care Reviewed With: patient     Overall Patient Progress: no change    Outcome Evaluation: Working with therapies. Needs must be anticipated. Slight drop in systolic bp with Lea Kristal transfers with therapy.    Orientation: Alert and oriented x4  Bowel: LBM 9/6 Continent and incontinent  Bladder: Continent and incontinent, uses Purewick overnight.  Pain: Denies pain.  Ambulation/Transfers: Assist of 2 with Lea Giraldo  Diet/ Liquids: Regular diet with moderately thick liquids. Takes pills whole.  Skin: Coccyx with Mepilex, breast wound covered, bruises on arm, shoulder, and abdomen.

## 2022-09-08 NOTE — PROGRESS NOTES
Discharge Planner Post-Acute Rehab OT:     Discharge Plan: home with spouse A, HC OT vs OP OT pending progress    Precautions: falls, covid + special precautions, dense L jong, monitor BP    Current Status:  ADLs:    Mobility: Ax1 SaraStedy with therapies only. Recommend Ax2 SaraStedy with nsg.    Grooming: SBA EOB wash face, Max A wash hands.     Dressing: UB- Max A pull over long sleeve shirt, Max A shorts, Min A socks with LLE figure 4 technique and jong technique using RUE    Bathing: Max A sponge bathing. Transfer N/A d/t isolation precautions.     Toileting: Transfer SaraStedy > OTC. Total A cares and hygiene mgmt. Recommend Ax2 nsg.  IADLs: IND PTA. Anticipate A at discharge.  Vision/Cognition: L strabismus baseline with pt reporting, pt reports worse vision in L eye at baseline. L inattention vs vision deficits noted in functional tasks. Functional deficits problem solving, sequencing, and wordfinding.    Assessment: 10 point systolic BP drop noted during SaraStedy transfers (161/94 >> 151/88), pt symptomatic with mild dizziness. RN alerted. Continue to monitor BP during functional transfers.    Other Barriers to Discharge (DME, Family Training, etc): All needs met on main level.     DME: TBD    Family training - Not scheduled as of 9/6/22.

## 2022-09-08 NOTE — PLAN OF CARE
FOCUS/GOAL  Bowel management, Bladder management, Pain management, Mobility, Skin integrity, and Safety management    ASSESSMENT, INTERVENTIONS AND CONTINUING PLAN FOR GOAL:  Patient is alert and oriented x 4. Denied pain, headache, dizziness, CP or SOB. A-2 liko transfer. Regular/ moderately thick liquid. Continent/incontinent of bladder, small incontinence on brief, moderate thicken liquid given and encouraged Bladder scanned was 152.last BM 09/07. BP elevated medication given. PRN BP medication is available per parameter. Left breast wound dressing changed, wound bleeds during dressing change. Patient denied pain to the wound site. Isolation precaution maintained. Call light is in reach alarm is on. Staff will continue to monitor.     Goal Outcome Evaluation:

## 2022-09-08 NOTE — PLAN OF CARE
Discharge Planner Post-Acute Rehab SLP:     Discharge Plan: Ongoing SLP, HH vs OP ?    Precautions: COVID, aspiration     Current Status:  Communication: Word finding difficulties in conversation   Cognition: Mild-mod cognitive impairments with deficits re: memory, attention, reasoning, language. Benefits from extra time   Swallow: Regular, moderately thick (3) liquid. Upright fully all PO. Medications given as tolerated with 3 liquid or puree.     Assessment:  Pt participated in reasoning/organization task given visual grid and prompts. Introduced to strategies to assist in organization and accuracy. Completed with 80% IND increasing to 100% with mod-max cues. Pt required cues to redirect to all material, interpret meaning, form corrections, and problem solve. Demonstrates impaired alternating attention, reasoning, and procedural recall. Good initiation.  Reviewed strategies previously introduced to aid word retrieval. Pt able to report these and utilize in structured verba task with 90% accuracy min cues    Other Barriers to Discharge (Family Training, etc): family training education re: thickened liquids (pending improvement) and IADL support

## 2022-09-09 ENCOUNTER — APPOINTMENT (OUTPATIENT)
Dept: PHYSICAL THERAPY | Facility: CLINIC | Age: 67
DRG: 056 | End: 2022-09-09
Attending: PHYSICAL MEDICINE & REHABILITATION
Payer: COMMERCIAL

## 2022-09-09 ENCOUNTER — APPOINTMENT (OUTPATIENT)
Dept: SPEECH THERAPY | Facility: CLINIC | Age: 67
DRG: 056 | End: 2022-09-09
Attending: PHYSICAL MEDICINE & REHABILITATION
Payer: COMMERCIAL

## 2022-09-09 ENCOUNTER — APPOINTMENT (OUTPATIENT)
Dept: OCCUPATIONAL THERAPY | Facility: CLINIC | Age: 67
DRG: 056 | End: 2022-09-09
Attending: PHYSICAL MEDICINE & REHABILITATION
Payer: COMMERCIAL

## 2022-09-09 PROCEDURE — 99207 PR NO BILLABLE SERVICE THIS VISIT: CPT | Performed by: STUDENT IN AN ORGANIZED HEALTH CARE EDUCATION/TRAINING PROGRAM

## 2022-09-09 PROCEDURE — 97112 NEUROMUSCULAR REEDUCATION: CPT | Mod: GO

## 2022-09-09 PROCEDURE — 99232 SBSQ HOSP IP/OBS MODERATE 35: CPT | Mod: CS | Performed by: PHYSICAL MEDICINE & REHABILITATION

## 2022-09-09 PROCEDURE — 97112 NEUROMUSCULAR REEDUCATION: CPT | Mod: GP | Performed by: PHYSICAL THERAPIST

## 2022-09-09 PROCEDURE — 97535 SELF CARE MNGMENT TRAINING: CPT | Mod: GO

## 2022-09-09 PROCEDURE — 250N000011 HC RX IP 250 OP 636

## 2022-09-09 PROCEDURE — 250N000013 HC RX MED GY IP 250 OP 250 PS 637

## 2022-09-09 PROCEDURE — 97129 THER IVNTJ 1ST 15 MIN: CPT | Mod: GN

## 2022-09-09 PROCEDURE — 128N000003 HC R&B REHAB

## 2022-09-09 PROCEDURE — 250N000013 HC RX MED GY IP 250 OP 250 PS 637: Performed by: PHYSICAL MEDICINE & REHABILITATION

## 2022-09-09 PROCEDURE — 92526 ORAL FUNCTION THERAPY: CPT | Mod: GN

## 2022-09-09 RX ADMIN — ACETAMINOPHEN 650 MG: 325 TABLET, FILM COATED ORAL at 05:00

## 2022-09-09 RX ADMIN — PANTOPRAZOLE SODIUM 40 MG: 40 TABLET, DELAYED RELEASE ORAL at 05:00

## 2022-09-09 RX ADMIN — HEPARIN SODIUM 5000 UNITS: 5000 INJECTION, SOLUTION INTRAVENOUS; SUBCUTANEOUS at 21:27

## 2022-09-09 RX ADMIN — CLOPIDOGREL BISULFATE 75 MG: 75 TABLET, FILM COATED ORAL at 09:03

## 2022-09-09 RX ADMIN — HEPARIN SODIUM 5000 UNITS: 5000 INJECTION, SOLUTION INTRAVENOUS; SUBCUTANEOUS at 09:03

## 2022-09-09 RX ADMIN — ASPIRIN 81 MG: 81 TABLET, COATED ORAL at 09:03

## 2022-09-09 RX ADMIN — AMLODIPINE BESYLATE 5 MG: 5 TABLET ORAL at 09:03

## 2022-09-09 RX ADMIN — ATORVASTATIN CALCIUM 80 MG: 80 TABLET, FILM COATED ORAL at 21:27

## 2022-09-09 RX ADMIN — LISINOPRIL 20 MG: 20 TABLET ORAL at 21:27

## 2022-09-09 RX ADMIN — Medication 5 MG: at 21:27

## 2022-09-09 ASSESSMENT — ACTIVITIES OF DAILY LIVING (ADL)
ADLS_ACUITY_SCORE: 48
ADLS_ACUITY_SCORE: 44
ADLS_ACUITY_SCORE: 44
ADLS_ACUITY_SCORE: 48
ADLS_ACUITY_SCORE: 44
ADLS_ACUITY_SCORE: 48
ADLS_ACUITY_SCORE: 44
ADLS_ACUITY_SCORE: 48

## 2022-09-09 NOTE — CONSULTS
Brief oncology note :    Stephani Garcia is a 67-year-old female who was admitted for acute ischemic stroke involving right frontal lobe and right basal ganglia.  She has left-sided hemiparesis with dysarthria aphasia and dysphagia.  She is currently in acute rehab for ongoing therapies and nursing.    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  This could be tumor with ulceration to the skin versus an inflammatory breast cancer .            An FNA was done by surgical team the pathology came as ductal carcinoma, ER and ME positive and HER2 equivocal (2+). Pathology showed ductal carcinoma, histological features including grade/pleomorphic cells/mitotic figures were unable to assess.     With a large size rapidly growing breast mass she would ideally need staging images with either a PET CT scan or CT chest abdomen pelvis.  Due to her comorbidities and the size, Surgery team do not think she is a candidate for mastectomy or lumpectomy.     PLAN   -Staging imaging needed CT chest abdomen pelvis or PET CT scan (if not inpatient then can be done outpatient).    -Would need FISH studies on the biopsy to know HER2 status.  This is important as chemotherapy for HER2 positive breast cancer is different from just a hormone positive.   -She needs to be assessed in the outpatient setting to see if she would be a candidate for neoadjuvant chemotherapy, including dose dense AC followed by paclitaxel.  -Endocrine therapy (with AI/anastrazole) can be considered, but is typically started after chemotherapy and/or after complete work up.    -Baseline bone density  Imaging would be needed for patients receiving an aromatase inhibitor due to risk of osteoporosis     She will be ARU for  at least 2 weeks, we can arrange a video visit with our Breast Oncologist to get things started.     She has a oncology referral placed last week, we will send her a message to the scheduling team to make an appointment soon.       Plan was discussed with Dr.Gupta Nneka Myers MD  Hematology/Oncology/Transplant Fellow   [facility]   Pgr :

## 2022-09-09 NOTE — PLAN OF CARE
Goal Outcome Evaluation:    Plan of Care Reviewed With: patient     Overall Patient Progress: no change    Outcome Evaluation: Pt reports no pain at this time. Still with special precautions. BP remains elevated, but under PRN parameters. Participating in therapy. Still requires anticipation for all needs such as brief changes, etc., only wearing purewick overnight.

## 2022-09-09 NOTE — PLAN OF CARE
"Goal Outcome Evaluation:    Plan of Care Reviewed With: patient     Overall Patient Progress: no change    ORIENTATION: A/O x4, dysarthric  TRANSFERS/AMBULATION: ubaldo steady A2  DIET: rg/mod thick/whole. Denies n/v  BOWEL/BLADDER: incont B/B; LBM 9/9. purewick at HS  PAIN: denies pain. Numbness to L extremetities   LDA: none  SKIN: dressings to L breast and sacrum  Other: SBP elevated; asymptomatic. SBP does not meet criteria for prn. Remains of special precautions; set to come off precautions 9/10      BP (!) 157/75 (BP Location: Right arm)   Pulse 88   Temp 97.2  F (36.2  C) (Oral)   Resp 18   Ht 1.702 m (5' 7\")   Wt 77.7 kg (171 lb 3.2 oz)   SpO2 96%   BMI 26.81 kg/m          "

## 2022-09-09 NOTE — PLAN OF CARE
FOCUS/GOAL  Bowel management, Bladder management, Pain management, Mobility, Skin integrity, and Safety management    ASSESSMENT, INTERVENTIONS AND CONTINUING PLAN FOR GOAL:  Patient is alert and oriented x 4. Denied pain, headache, dizziness, CP or SOB. A-2 ubaldo steady transfer. Regular/ moderately thick liquid. Continent/incontinent of bladder last BM 09/08. BP elevated PRN hydralazine per BP parameter was effective. Last /80. Left breast wound dressing changed with minimum bleeding. Patient denied pain to the wound site. Isolation precaution maintained. Call light is in reach alarm is on. Staff will continue to monitor.   Goal Outcome Evaluation:

## 2022-09-09 NOTE — PROGRESS NOTES
Discharge Planner Post-Acute Rehab OT:     Discharge Plan: home with spouse A, HC OT vs OP OT pending progress    Precautions: falls, covid + special precautions, dense L jong, monitor BP    Current Status:  ADLs:    Mobility: Ax1 SaraStedy with therapies only. Recommend Ax2 SaraStedy with nsg.    Grooming: SBA EOB wash face, Max A wash hands.     Dressing: UB- Max A pull over long sleeve shirt, Max A shorts, Min A socks with LLE figure 4 technique and jong technique using RUE    Bathing: Max A sponge bathing. Transfer N/A d/t isolation precautions.     Toileting: Transfer SaraStedy > OTC. Total A cares and hygiene mgmt. Recommend Ax2 nsg.  IADLs: IND PTA. Anticipate A at discharge.  Vision/Cognition: L strabismus baseline with pt reporting, pt reports worse vision in L eye at baseline. L inattention vs vision deficits noted in functional tasks. Functional deficits problem solving, sequencing, and wordfinding.    Assessment: Progressing standing tolerance in SaraStedy transfers and LBD tasks. Initiated e-stim for LUE in agreement with MD and pt in context of cancer diagnosis. Plan to initiate LUE FES bike to promote sensorimotor recovery Sunday or Monday pending schedule.    Other Barriers to Discharge (DME, Family Training, etc): All needs met on main level.     DME: TBD    Family training - Not scheduled as of 9/6/22.

## 2022-09-09 NOTE — PROGRESS NOTES
"  [facility]   Acute Rehabilitation Unit  Daily progress note    INTERVAL HISTORY  Stephani Garcia was seen and examined at bedside this morning. She notes she has not been sleeping well but thinks its due to the multiple diagnoses at the moment. She wanted to know what the plan would be going forward at this point. Discussed with her the oncology team intends to follow up with her as OP.     ROS: 10 point ROS neg other than the symptoms noted above in the HPI.    Functional  PT:  Bed Mobility: Min A supine<>sit for trunk and L LE management  Transfer: Ax2 Lea Kristal with nursing. Squat pivot with therapies only- Min-ModA to the R  Gait: Unsafe  Stairs: Unsafe  Balance: Fair static/dynamic seated balance- LOB to the L primarily  PASS on 9/6: 13/36    OT:  Assessment: Progressing standing tolerance in SaraStedy transfers and LBD tasks. Initiated e-stim for LUE in agreement with MD and pt in context of cancer diagnosis. Plan to initiate LUE FES bike to promote sensorimotor recovery Sunday or Monday pending schedule.      Today's Updates:  - Oncology to work with Surgery for possible initiation of treatment while in ARU.  - Plan to remove COVID special precautions tomorrow 9/10.    MEDICATIONS    amLODIPine  5 mg Oral Daily     aspirin  81 mg Oral Daily     atorvastatin  80 mg Oral QPM     clopidogrel  75 mg Oral Daily     heparin ANTICOAGULANT  5,000 Units Subcutaneous Q12H     lisinopril  20 mg Oral At Bedtime     melatonin  5 mg Oral At Bedtime     pantoprazole  40 mg Oral QAM AC        acetaminophen, hydrALAZINE, polyethylene glycol, senna-docusate     PHYSICAL EXAM  BP (!) 165/88 (BP Location: Right arm, Patient Position: Semi-West's, Cuff Size: Adult Regular)   Pulse 84   Temp (!) 96.4  F (35.8  C) (Oral)   Resp 16   Ht 1.702 m (5' 7\")   Wt 77.7 kg (171 lb 3.2 oz)   SpO2 95%   BMI 26.81 kg/m    General:  Seated up in bed not in acute distress   HEENT: EOMI " "(strabismus in L. Eye), NC/NT, Moist mucous membranes  Pulmonary: Clear bilateral airways on ascultation.  Cardiovascular:  S1 and S2 2+ radial pulse, well perfused   Abdominal: Non-tender, non-distended  Extremities: warm, well perfused, no edema in bilateral lower extremities, no tenderness in calves, power unchanged with L. Hemiparesis.   Neuro/MSK: Alert and oriented, face symmetric, Dysarthric.    LABS  No results found for this or any previous visit (from the past 24 hour(s)).    Rehabilitation - continue comprehensive acute inpatient rehabilitation program with multidisciplinary approach including therapies, rehab nursing, and physiatry following. See interval history for updates.      ASSESSMENT AND PLAN  # Acute ischemic stroke   # Recent infarcts in the right frontal lobe and right basal ganglia  Noted left-sided partial hemiparesis and mild dysarthria/expressive aphasia at presentation 8/30. MRI confirmed  Acute ischemic stroke, and TPA/ thrombectomy 2/2 unknown last known normal. CTA showed no large vessel occlusion, with only 30% of ICA occlusion. Echo was normal EF 65% on 8/31. No Afib noted with telemetry.  at baseline. BP goal is <140/90 at rest, anticipate increase with therapies.   - Continue Plavix 75 mg and aspirin 81 mg for 90 days then switch to ASA 325mg mono therapy  - Continue Atorvastatin 80 mg at bedtime.   - Needs PT/OT/SLP evaluation       # Dysphagia    Noted during Video swallow study on 9/2/22: \"Patient had direct, silent aspiration with mildly thick liquid on first trial and deep penetration with mildly thick liquids on subsequent trials. No aspiration or penetration with moderately thick liquids.   - Continue dysphagia diet with moderately thick fluids  - SLP to evaluate     # Left Breast mass  Has had a FNA 9/2 by surgery. There is a high suspicion for breast CA. Surgery rec'd neoadjuvent treatment with hopes of shrinking tumor prior to surgery. Unable to surgically operated " due to stroke at the moment.   - Needs referral to oncology for outpatient follow-up.  --9/7 biopsy results: ductal carcinoma and estrogen and progesterone positive.  Due to her stroke she is not a candidate for surgery right now.    - [facility]  - WOCN consult: appreciate recs 9/6      # Hyperlipidemia    at baseline.  - statin high-dose as above     # Hypertension  Was allowed permissive HTN, first 24 hours but started to bring it down slowly. Slowly decrease BP, with goal to keep <140/90 at rest.  - Continue lisinopril 20 mg daily  - Started Amlodipine 5 mg Daily 9/6  - Started Hydralazine 10 mg PRN 9/6  - Monitor BP anticipate some increases during the therapy sessions.       #GILDA (obstructive sleep apnea):  Patient does not use CPAP at baseline. May need repeat sleep study and eval by sleep medicine team.   - encourage using CPAP and call RT for trial     # Asymptomatic COVID 19 Infection    Positive Covid test on 8/3 with preceding cold symptoms for 3 days prior. Currently she continues remain asymptomatic from COVID symptoms on 9/5. She has no cough, wheezing, SOB, fever, chills, body aches or any type of cold symptoms.  She has completed remdesivir x 3 days.  - Could possibly come off quarantine 9/9 if Covid test is neg.     # Neurogenic Bladder  Using diaper 2/2 incontinence. Discussed with her on options to do timed voids during the stay.  - Consider doing timed voids   - Check PVR's initially for any retention.       # Bowel   Noted mild loose stools 9/4. Has no had BM as of this morning.  - Start PRN Bowel meds with Senna and Miralax. Will observe constipation.     # Sleep adjustment  - Added Melatonin 3 mg for sleep      6. Adjustment to disability:  Clinical psychology to eval and treat. Consult sent 9/6 pending review.   7. FEN: Regular Diet Adult Liquidized/Moderately Thick (level 3)  8. Bowel: PRN Senna and Miralax  9. Bladder: Pending PVR review, mgith need a  scheduled bladder program  10. DVT Prophylaxis: Heparin 5000 units for DVT ppx. Plavix 75 mg daily and ASA 81 mg 90 days then ASA alone.   11. GI Prophylaxis: Pantoprazole 40 mg   12. Code: Full  13. Disposition: Home  14. ELOS:  21 days.  15. Rehab prognosis:  Fair  16. Follow up Appointments on Discharge:   - PCP follow up 7-14 days post discharge  - Follow-up with neurology in 6 to 8 weeks  - Referral for oncology given biopsy results   - GILDA: outpatient referral to sleep medicine clinic if agreeable     Estrada Kevin MD  Resident Physician -PGY2  Physical Medicine and Rehabilitation.

## 2022-09-09 NOTE — PLAN OF CARE
Discharge Planner Post-Acute Rehab SLP:     Discharge Plan: Ongoing SLP, HH vs OP ?    Precautions: COVID, aspiration     Current Status:  Communication: Word finding difficulties in conversation   Cognition: Mild-mod cognitive impairments with deficits re: memory, attention, reasoning, language. Benefits from extra time   Swallow: Regular, moderately thick (3) liquid. Upright fully all PO. Medications given as tolerated with 3 liquid or puree.     Assessment:  Pt introduced to pharyngeal exercises for effortful swallow + CTAR. Provided education re: procedure, demonstration, and rationale for exercises. Visual handout provided for pt also listing procedure and rationale for exercise. Pt able to complete IND following education but benefited from cues to continue reps and take sips moderately thic k(3) liquid between. Pt with few instances coughing following sip level 3 liquid but coughing noted not always following intake. Pt completed x20 reps combined exercise and required to complete x100/day as able. Pt verbalized understanding.  Pt did not complete assigned task from yesterday. Pt expressed ongoing frustration re: difficulty with reasoning tasks. SLP provided support, feedback, education, and addressed concerns.    Other Barriers to Discharge (Family Training, etc): family training education re: thickened liquids (pending improvement) and IADL support

## 2022-09-09 NOTE — PLAN OF CARE
Discharge Planner Post-Acute Rehab PT:      Discharge Plan: Home with  assist, 1 DAVE without rail. Home care vs OP PT, pending progress.     Precautions: Falls, L hemiplegia, dysphagia, breast CA, Covid special precautions     Current Status:  Bed Mobility: Min A supine<>sit for trunk and L LE management  Transfer: Ax2 Lea Giraldo with nursing. Squat pivot with therapies only- Min-ModA to the R  Gait: Unsafe  Stairs: Unsafe  Balance: Fair static/dynamic seated balance- LOB to the L primarily  PASS on 9/6: 13/36     Assessment: Notes moderate fatigue today, which she relates d/t poor sleep the past two nights. Session focused on seated EOB core stability/multi-directional reaching and standing pre-gait activities with bedrail and min/mod A d/t L knee buckling. Remains highly motivated despite fatigue.     Other Barriers to Discharge (DME, Family Training, etc):   Equipment: Anticipate K4 WC, quad cane, gait belt, L Eunice Neurexa  Family training to be scheduled.

## 2022-09-09 NOTE — PLAN OF CARE
FOCUS/GOAL  Medical management    ASSESSMENT, INTERVENTIONS AND CONTINUING PLAN FOR GOAL:  Patient alert and oriented, able to make needs known  Slept ON and OFF overnight, still having trouble with sleep  Denied  pain, headache, chest pain, N&V, no SOB  Requested Tylenol @ 0500 for comfort   Purewick in placed at night,with minimum amount of output, Bladder scan reading was 139, encouraged pt to increase fluid intake, thickened liquids made available at bedside, No Bm this shift  Safety rounding checked completed, 3 side rails UP, bed alarm ON, call light in reach  May continue with POC.   Goal Outcome Evaluation:

## 2022-09-10 ENCOUNTER — APPOINTMENT (OUTPATIENT)
Dept: SPEECH THERAPY | Facility: CLINIC | Age: 67
DRG: 056 | End: 2022-09-10
Attending: PHYSICAL MEDICINE & REHABILITATION
Payer: COMMERCIAL

## 2022-09-10 ENCOUNTER — APPOINTMENT (OUTPATIENT)
Dept: OCCUPATIONAL THERAPY | Facility: CLINIC | Age: 67
DRG: 056 | End: 2022-09-10
Attending: PHYSICAL MEDICINE & REHABILITATION
Payer: COMMERCIAL

## 2022-09-10 ENCOUNTER — APPOINTMENT (OUTPATIENT)
Dept: PHYSICAL THERAPY | Facility: CLINIC | Age: 67
DRG: 056 | End: 2022-09-10
Attending: PHYSICAL MEDICINE & REHABILITATION
Payer: COMMERCIAL

## 2022-09-10 PROBLEM — U07.1 INFECTION DUE TO 2019 NOVEL CORONAVIRUS: Status: RESOLVED | Noted: 2022-08-30 | Resolved: 2022-09-10

## 2022-09-10 PROCEDURE — 250N000011 HC RX IP 250 OP 636

## 2022-09-10 PROCEDURE — 97129 THER IVNTJ 1ST 15 MIN: CPT | Mod: GN

## 2022-09-10 PROCEDURE — 97110 THERAPEUTIC EXERCISES: CPT | Mod: GO

## 2022-09-10 PROCEDURE — 99232 SBSQ HOSP IP/OBS MODERATE 35: CPT | Mod: CS | Performed by: STUDENT IN AN ORGANIZED HEALTH CARE EDUCATION/TRAINING PROGRAM

## 2022-09-10 PROCEDURE — 250N000013 HC RX MED GY IP 250 OP 250 PS 637

## 2022-09-10 PROCEDURE — 128N000003 HC R&B REHAB

## 2022-09-10 PROCEDURE — 97112 NEUROMUSCULAR REEDUCATION: CPT | Mod: GP | Performed by: PHYSICAL THERAPIST

## 2022-09-10 PROCEDURE — 250N000013 HC RX MED GY IP 250 OP 250 PS 637: Performed by: PHYSICAL MEDICINE & REHABILITATION

## 2022-09-10 PROCEDURE — 92526 ORAL FUNCTION THERAPY: CPT | Mod: GN

## 2022-09-10 RX ADMIN — HEPARIN SODIUM 5000 UNITS: 5000 INJECTION, SOLUTION INTRAVENOUS; SUBCUTANEOUS at 07:57

## 2022-09-10 RX ADMIN — CLOPIDOGREL BISULFATE 75 MG: 75 TABLET, FILM COATED ORAL at 07:57

## 2022-09-10 RX ADMIN — PANTOPRAZOLE SODIUM 40 MG: 40 TABLET, DELAYED RELEASE ORAL at 06:49

## 2022-09-10 RX ADMIN — LISINOPRIL 20 MG: 20 TABLET ORAL at 21:09

## 2022-09-10 RX ADMIN — Medication 5 MG: at 21:08

## 2022-09-10 RX ADMIN — HEPARIN SODIUM 5000 UNITS: 5000 INJECTION, SOLUTION INTRAVENOUS; SUBCUTANEOUS at 21:07

## 2022-09-10 RX ADMIN — ATORVASTATIN CALCIUM 80 MG: 80 TABLET, FILM COATED ORAL at 21:08

## 2022-09-10 RX ADMIN — ASPIRIN 81 MG: 81 TABLET, COATED ORAL at 07:57

## 2022-09-10 RX ADMIN — AMLODIPINE BESYLATE 5 MG: 5 TABLET ORAL at 07:57

## 2022-09-10 ASSESSMENT — ACTIVITIES OF DAILY LIVING (ADL)
ADLS_ACUITY_SCORE: 48
ADLS_ACUITY_SCORE: 50
ADLS_ACUITY_SCORE: 50
ADLS_ACUITY_SCORE: 48
ADLS_ACUITY_SCORE: 46
ADLS_ACUITY_SCORE: 48
ADLS_ACUITY_SCORE: 46
ADLS_ACUITY_SCORE: 48

## 2022-09-10 NOTE — PLAN OF CARE
Skilled set up on :  Pt dependent for proper placement of electrodes on L quads, hamstrings, gastroc, and anterior tibialis for optimum muscle contraction, safe positioning on w/c within frame and cushion for prevention of skin breakdown, feet secured to foot pedals, w/c secured to  w/ Q-straints.  Passive motion assessed to ensure proper positioning.      Pt performed 20 minutes of active FES ergometry with 100% stimulation applied to above muscles at ~40rpm with 0.91nm resistance.  This PT adjusted e-stim and cycling parameters in real-time to ensure palpable muscle contractions throughout session.  Please see [email or web address] for further details on patient's stimulation parameters and ergometry outcomes.      Changes in parameters that were part of this treatment:   -resistance  -amplitude  -pedal speed    Functional outcomes from this intervention include:   -improved sensory awareness and proprioception  -improved muscle strength  -improved motor coordination    Session Summary:   -Average Power: 2.6 W  -Asymmetry: Right 2%  -Active Minutes: 16:21

## 2022-09-10 NOTE — PLAN OF CARE
Goal Outcome Evaluation:    Plan of Care Reviewed With: patient     Overall Patient Progress: improving    Outcome Evaluation: Pt off special precautions today and voices excitement to have family visit her. Reports no pain. Participating in therapies. Breast/coccyx dressings CDI. Remains incontinent, needs anticipated. Pt educated to call when she has the urge to urinate so she can use the toilet if possible.

## 2022-09-10 NOTE — PLAN OF CARE
FOCUS/GOAL  Medical management    ASSESSMENT, INTERVENTIONS AND CONTINUING PLAN FOR GOAL:  Slept well. Remain on special precautions. Purewick on during NOC but leaked. She was changed this morning. No BM to report. Denied pain. Will continue with POC.  Goal Outcome Evaluation:    Plan of Care Reviewed With: other (see comments)     Overall Patient Progress: no change    Outcome Evaluation: No Change/

## 2022-09-10 NOTE — PROGRESS NOTES
"  [facility]   Acute Rehabilitation Unit  Daily progress note    INTERVAL HISTORY  Stephani Garcia was seen and examined at bedside this morning. She states that she slept a little better last night. She denies chest pain, SOB, abdominal pain, headaches or dizziness. She is asking about when her COVID precautions can be taken off, planned for today. No other concerns this morning.    ROS: 10 point ROS neg other than the symptoms noted above in the HPI.    Functional  PT:  Bed Mobility: Min A supine<>sit for trunk and L LE management  Transfer: Ax2 Lea Kristal with nursing. Squat pivot with therapies only- Min-ModA to the R  Gait: Unsafe  Stairs: Unsafe  Balance: Fair static/dynamic seated balance- LOB to the L primarily  PASS on 9/6: 13/36    OT:  Assessment: Progressing standing tolerance in SaraStedy transfers and LBD tasks. Initiated e-stim for LUE in agreement with MD and pt in context of cancer diagnosis. Plan to initiate LUE FES bike to promote sensorimotor recovery Sunday or Monday pending schedule.      Today's Updates:  - Oncology to work with Surgery for possible initiation of treatment while in ARU.  - Plan to remove COVID special precautions tomorrow 9/10.    MEDICATIONS    amLODIPine  5 mg Oral Daily     aspirin  81 mg Oral Daily     atorvastatin  80 mg Oral QPM     clopidogrel  75 mg Oral Daily     heparin ANTICOAGULANT  5,000 Units Subcutaneous Q12H     lisinopril  20 mg Oral At Bedtime     melatonin  5 mg Oral At Bedtime     pantoprazole  40 mg Oral QAM AC        acetaminophen, hydrALAZINE, polyethylene glycol, senna-docusate     PHYSICAL EXAM  BP (!) 169/82 (BP Location: Right arm, Patient Position: Fowlers, Cuff Size: Adult Regular)   Pulse 63   Temp (!) 96.1  F (35.6  C) (Oral)   Resp 16   Ht 1.702 m (5' 7\")   Wt 77.7 kg (171 lb 3.2 oz)   SpO2 95%   BMI 26.81 kg/m    General:  Seated up in bed not in acute distress   HEENT: EOMI (strabismus in L. Eye), " NC/NT, Moist mucous membranes  Pulmonary: Clear bilateral airways on ascultation.  Cardiovascular:  S1 and S2 2+ radial pulse, well perfused   Abdominal: Non-tender, non-distended  Extremities: warm, well perfused, no edema in bilateral lower extremities, no tenderness in calves, power unchanged with L. Hemiparesis.   Neuro/MSK: Alert and oriented, face symmetric    LABS  No results found for this or any previous visit (from the past 24 hour(s)).    Rehabilitation - continue comprehensive acute inpatient rehabilitation program with multidisciplinary approach including therapies, rehab nursing, and physiatry following. See interval history for updates.      ASSESSMENT AND PLAN  Stephani Garcia is a 67 year old left hand dominant female with PMHx Hypertension, Hyperlipidemia, vertigo, GILDA (not using CPAP). Admitted for acute ischemic infarcts with right frontal lobe and right basal ganglia involvement. Has functional deficits of weak L. Shoulder shrug, L. Hemiparesis, dysphagia, dysarthria, possbile neurogenic bowel/bladder and cognitive deficits. She also has possible L. Breast CA pending pathology results. She is admitted to undergo therapies with PT/OT/SLP.     Admission to acute inpatient rehab: 9/5/22     --Vitals stable. No lab today.  --Continue ongoing medical management.  --Continue therapies and plan of care.    Lachelle Polk MD  Physical Medicine & Rehabilitation    I spent a total of 25 minutes face-to-face and managing the care of the patient. Over 50% of my time on the unit was spent counseling the patient and coordinating care. See note for details.

## 2022-09-10 NOTE — PLAN OF CARE
Discharge Planner Post-Acute Rehab SLP:     Discharge Plan: Ongoing SLP, HH vs OP ?    Precautions: COVID, aspiration     Current Status:  Communication: Word finding difficulties in conversation   Cognition: Mild-mod cognitive impairments with deficits re: memory, attention, reasoning, language. Benefits from extra time   Swallow: Regular, moderately thick (3) liquid. Upright fully all PO. Medications given as tolerated with 3 liquid or puree.     Assessment:   Pt performed deductive reasoning task x 100% accuracy with min/mod verbal cues.  Pt performed pharyngeal strengthening exercises x 2 sets of 10 reps including CTAR with effortful swallow and Ariella.    Other Barriers to Discharge (Family Training, etc): family training education re: thickened liquids (pending improvement) and IADL support

## 2022-09-10 NOTE — PLAN OF CARE
"Goal Outcome Evaluation:    Plan of Care Reviewed With: patient     Overall Patient Progress: improving    ORIENTATION: A/O, word finding difficulty  TRANSFERS/AMBULATION: ubaldo steady A2. W/c based  DIET: reg/L3/whole. Denies n/v  BOWEL/BLADDER:  Timed toileting started this shift. Continent B/B; LBM 9/10. purewick at HS  PAIN: denies pain, SOB, numbness/tingling  LDA: purewick at HS  SKIN: breast wound, mepilex to sacrum  Other; SBP elevated; asymptomatic.     BP (!) 156/83 (BP Location: Right arm)   Pulse 91   Temp (!) 96.7  F (35.9  C) (Oral)   Resp 16   Ht 1.702 m (5' 7\")   Wt 77.7 kg (171 lb 3.2 oz)   SpO2 96%   BMI 26.81 kg/m              "

## 2022-09-11 ENCOUNTER — APPOINTMENT (OUTPATIENT)
Dept: SPEECH THERAPY | Facility: CLINIC | Age: 67
DRG: 056 | End: 2022-09-11
Attending: PHYSICAL MEDICINE & REHABILITATION
Payer: COMMERCIAL

## 2022-09-11 ENCOUNTER — APPOINTMENT (OUTPATIENT)
Dept: OCCUPATIONAL THERAPY | Facility: CLINIC | Age: 67
DRG: 056 | End: 2022-09-11
Attending: PHYSICAL MEDICINE & REHABILITATION
Payer: COMMERCIAL

## 2022-09-11 ENCOUNTER — APPOINTMENT (OUTPATIENT)
Dept: PHYSICAL THERAPY | Facility: CLINIC | Age: 67
DRG: 056 | End: 2022-09-11
Attending: PHYSICAL MEDICINE & REHABILITATION
Payer: COMMERCIAL

## 2022-09-11 LAB
GLUCOSE BLDC GLUCOMTR-MCNC: 119 MG/DL (ref 70–99)
HOLD SPECIMEN: NORMAL
PLATELET # BLD AUTO: 289 10E3/UL (ref 150–450)

## 2022-09-11 PROCEDURE — 92526 ORAL FUNCTION THERAPY: CPT | Mod: GN

## 2022-09-11 PROCEDURE — 128N000003 HC R&B REHAB

## 2022-09-11 PROCEDURE — 250N000011 HC RX IP 250 OP 636

## 2022-09-11 PROCEDURE — 85049 AUTOMATED PLATELET COUNT: CPT | Performed by: PHYSICAL MEDICINE & REHABILITATION

## 2022-09-11 PROCEDURE — 97112 NEUROMUSCULAR REEDUCATION: CPT | Mod: GO | Performed by: OCCUPATIONAL THERAPIST

## 2022-09-11 PROCEDURE — 97150 GROUP THERAPEUTIC PROCEDURES: CPT | Mod: GP | Performed by: PHYSICAL THERAPIST

## 2022-09-11 PROCEDURE — 97535 SELF CARE MNGMENT TRAINING: CPT | Mod: GO | Performed by: OCCUPATIONAL THERAPIST

## 2022-09-11 PROCEDURE — 250N000013 HC RX MED GY IP 250 OP 250 PS 637: Performed by: PHYSICAL MEDICINE & REHABILITATION

## 2022-09-11 PROCEDURE — 97129 THER IVNTJ 1ST 15 MIN: CPT | Mod: GN

## 2022-09-11 PROCEDURE — 36415 COLL VENOUS BLD VENIPUNCTURE: CPT | Performed by: PHYSICAL MEDICINE & REHABILITATION

## 2022-09-11 PROCEDURE — 250N000013 HC RX MED GY IP 250 OP 250 PS 637

## 2022-09-11 RX ADMIN — ATORVASTATIN CALCIUM 80 MG: 80 TABLET, FILM COATED ORAL at 20:27

## 2022-09-11 RX ADMIN — LISINOPRIL 20 MG: 20 TABLET ORAL at 20:28

## 2022-09-11 RX ADMIN — Medication 5 MG: at 20:27

## 2022-09-11 RX ADMIN — CLOPIDOGREL BISULFATE 75 MG: 75 TABLET, FILM COATED ORAL at 08:51

## 2022-09-11 RX ADMIN — HEPARIN SODIUM 5000 UNITS: 5000 INJECTION, SOLUTION INTRAVENOUS; SUBCUTANEOUS at 20:28

## 2022-09-11 RX ADMIN — PANTOPRAZOLE SODIUM 40 MG: 40 TABLET, DELAYED RELEASE ORAL at 06:29

## 2022-09-11 RX ADMIN — HEPARIN SODIUM 5000 UNITS: 5000 INJECTION, SOLUTION INTRAVENOUS; SUBCUTANEOUS at 08:51

## 2022-09-11 RX ADMIN — ASPIRIN 81 MG: 81 TABLET, COATED ORAL at 08:51

## 2022-09-11 RX ADMIN — AMLODIPINE BESYLATE 5 MG: 5 TABLET ORAL at 08:51

## 2022-09-11 ASSESSMENT — ACTIVITIES OF DAILY LIVING (ADL)
ADLS_ACUITY_SCORE: 50
ADLS_ACUITY_SCORE: 46
ADLS_ACUITY_SCORE: 50
ADLS_ACUITY_SCORE: 50
ADLS_ACUITY_SCORE: 46
ADLS_ACUITY_SCORE: 50
ADLS_ACUITY_SCORE: 50
ADLS_ACUITY_SCORE: 46

## 2022-09-11 NOTE — PLAN OF CARE
Discharge Planner Post-Acute Rehab SLP:     Discharge Plan: Ongoing SLP, HH vs OP ?    Precautions: aspiration     Current Status:  Communication: Word finding difficulties in conversation   Cognition: Mild-mod cognitive impairments with deficits re: memory, attention, reasoning, language. Benefits from extra time   Swallow: Regular, moderately thick (3) liquid. Upright fully all PO. Medications given as tolerated with 3 liquid or puree.     Assessment:   SLP: Patient engaged in oropharyngeal exercises. Verbal cues needed to recall several of the exercises correctly. Did have intermittent coughing noted with moderately thick liquids in association with the OME/PME.    Other Barriers to Discharge (Family Training, etc): family training education re: thickened liquids (pending improvement) and IADL support

## 2022-09-11 NOTE — PLAN OF CARE
FOCUS/GOAL  Medical management    ASSESSMENT, INTERVENTIONS AND CONTINUING PLAN FOR GOAL:  Good night of sleep. She was up a little before 0400. Reported chills.  Vitals taken , there were WNL. She asked for something to eat was shaking and very  And irritable 9 yelled at writer for no reason then apologized).. BG checked 119. Crackers, apple juice and pears given. She declined peanut butter. She fell asleep after eating. Purewick on this shift without any output. Bladder scan done, had 126 ml. Encouraged to fluid, 240 ml water given, she drunk only 100 ml on top of 50 ml apple juice taken previously. Education given with good feedback.. Dehydration  risk discussed.  Denied pain. Will continue with POC.  Goal Outcome Evaluation:    Plan of Care Reviewed With: other (see comments)     Overall Patient Progress: no change    Outcome Evaluation: No change this shift.

## 2022-09-11 NOTE — PLAN OF CARE
Discharge Planner Post-Acute Rehab SLP:     Discharge Plan: Ongoing SLP, HH vs OP ?    Precautions: aspiration     Current Status:  Communication: Word finding difficulties in conversation   Cognition: Mild-mod cognitive impairments with deficits re: memory, attention, reasoning, language. Benefits from extra time   Swallow: Regular, moderately thick (3) liquid. Upright fully all PO. Medications given as tolerated with 3 liquid or puree.     Assessment:  Pt performed mental flexibility task in conjunction to word retrieval x 90% independently.  Cue x 1 for improvement to 100%. Patient engaged in oropharyngeal exercises. Minimal verbal cues needed to recall several of the exercises correctly. Pt performed exercises x 2 sets of 10 reps.    Other Barriers to Discharge (Family Training, etc): family training education re: thickened liquids (pending improvement) and IADL support

## 2022-09-11 NOTE — PLAN OF CARE
Skilled set up on :  Pt dependent for proper placement of electrodes on LUE for optimum muscle contraction, safe positioning on w/c within frame and cushion for prevention of skin breakdown, UE secured to arm support.  Passive motion assessed to ensure proper positioning.      Pt performed 34 minutes of active FES ergometry with 0-100% stimulation applied to above muscles at 35 rpm with .81 nm resistance.  This OT adjusted e-stim and cycling parameters in real-time to ensure palpable muscle contractions throughout session.  Please see [email or web address] for further details on patient's stimulation parameters and ergometry outcomes.      Changes in parameters that were part of this treatment:   -resistance  -pulse width, frequency  -amplitude  -pedal speed    Functional outcomes from this intervention include:   -reduced spasticity  -improved sensory awareness and proprioception  -improved muscle strength  -improved motor coordination    Alignment 0%  Active Time 3 min  Passive Time 30 min

## 2022-09-11 NOTE — PLAN OF CARE
"Goal Outcome Evaluation:    Plan of Care Reviewed With: patient     Overall Patient Progress: no change    ORIENTATION: A/O, word finding difficulty  TRANSFERS/AMBULATION: ubaldo steady A2. W/c based  DIET: reg/L3/whole. Denies n/v  BOWEL/BLADDER:  Timed toileting. Continent B/B; LBM 9/11. purewick at HS  PAIN: denies pain, SOB, numbness/tingling  LDA: purewick at HS  SKIN: breast wound dressing c/d/i, mepilex to sacrum  Other; SBP elevated; asymptomatic and under prn parameters.    BP (!) 161/84 (BP Location: Right arm)   Pulse 94   Temp (!) 95.8  F (35.4  C) (Oral)   Resp 16   Ht 1.702 m (5' 7\")   Wt 77.7 kg (171 lb 3.2 oz)   SpO2 95%   BMI 26.81 kg/m           "

## 2022-09-11 NOTE — PLAN OF CARE
Goal Outcome Evaluation:    Plan of Care Reviewed With: patient     Overall Patient Progress: no change    Outcome Evaluation: Pt reports no pain. Wound dressings CDI. Has been continent of bladder this shift, called to use the toilet. Fluids encouraged. Reported shakiness on NOC shift, but declines any shakiness today.

## 2022-09-11 NOTE — PROGRESS NOTES
Discharge Planner Post-Acute Rehab OT:     Discharge Plan: home with spouse A, HC OT vs OP OT pending progress    Precautions: falls, covid + special precautions, dense L jong, monitor BP    Current Status:  ADLs:    Mobility: Ax1 SaraStedy with therapies only. Recommend Ax2 SaraStedy with nsg.    Grooming: SBA EOB wash face, Max A wash hands.     Dressing: UB- Max A pull over long sleeve shirt, Max A shorts, Min A socks with LLE figure 4 technique and jong technique using RUE    Bathing: Max A sponge bathing. Transfer N/A d/t isolation precautions.     Toileting: Transfer SaraStedy > OTC. Total A cares and hygiene mgmt. Recommend Ax2 nsg.  IADLs: IND PTA. Anticipate A at discharge.  Vision/Cognition: L strabismus baseline with pt reporting, pt reports worse vision in L eye at baseline. L inattention vs vision deficits noted in functional tasks. Functional deficits problem solving, sequencing, and wordfinding.    Assessment: Facilitated LUE neuro-re-edu w/ manual resistive ex in isolated muscles w/ graded gravity planes. Distal< active verse proximal muscles. Pt made good effort and demo'd fair response. Continue LUE neuro-redu to increase I w/ ADLs/tsfers and initiate FES tomorrow.     Other Barriers to Discharge (DME, Family Training, etc): All needs met on main level.     DME: TBD    Family training - Not scheduled as of 9/6/22.

## 2022-09-11 NOTE — PLAN OF CARE
Pt  attended Falls Prevention class today with group of 5 patients.  Pt selected for class due to documented gait deficit and falls risk.  Class includes education in falls risks, how to decrease that risk through behavior changes,  home modifications, and energy conservation. Instruction in available equipment designed to increase home safety was included. Pt was able to verbalize understanding of materials and participated appropriately in the discussion and problem-solving segments of the class.

## 2022-09-12 ENCOUNTER — APPOINTMENT (OUTPATIENT)
Dept: SPEECH THERAPY | Facility: CLINIC | Age: 67
DRG: 056 | End: 2022-09-12
Attending: PHYSICAL MEDICINE & REHABILITATION
Payer: COMMERCIAL

## 2022-09-12 ENCOUNTER — APPOINTMENT (OUTPATIENT)
Dept: OCCUPATIONAL THERAPY | Facility: CLINIC | Age: 67
DRG: 056 | End: 2022-09-12
Attending: PHYSICAL MEDICINE & REHABILITATION
Payer: COMMERCIAL

## 2022-09-12 ENCOUNTER — APPOINTMENT (OUTPATIENT)
Dept: PHYSICAL THERAPY | Facility: CLINIC | Age: 67
DRG: 056 | End: 2022-09-12
Attending: PHYSICAL MEDICINE & REHABILITATION
Payer: COMMERCIAL

## 2022-09-12 LAB
ANION GAP SERPL CALCULATED.3IONS-SCNC: 1 MMOL/L (ref 3–14)
BASOPHILS # BLD AUTO: 0 10E3/UL (ref 0–0.2)
BASOPHILS NFR BLD AUTO: 0 %
BUN SERPL-MCNC: 15 MG/DL (ref 7–30)
CALCIUM SERPL-MCNC: 9.6 MG/DL (ref 8.5–10.1)
CHLORIDE BLD-SCNC: 109 MMOL/L (ref 94–109)
CO2 SERPL-SCNC: 32 MMOL/L (ref 20–32)
CREAT SERPL-MCNC: 0.75 MG/DL (ref 0.52–1.04)
EOSINOPHIL # BLD AUTO: 0.3 10E3/UL (ref 0–0.7)
EOSINOPHIL NFR BLD AUTO: 3 %
ERYTHROCYTE [DISTWIDTH] IN BLOOD BY AUTOMATED COUNT: 12.8 % (ref 10–15)
GFR SERPL CREATININE-BSD FRML MDRD: 86 ML/MIN/1.73M2
GLUCOSE BLD-MCNC: 104 MG/DL (ref 70–99)
HCT VFR BLD AUTO: 40.6 % (ref 35–47)
HGB BLD-MCNC: 13.6 G/DL (ref 11.7–15.7)
IMM GRANULOCYTES # BLD: 0.1 10E3/UL
IMM GRANULOCYTES NFR BLD: 1 %
LYMPHOCYTES # BLD AUTO: 1.4 10E3/UL (ref 0.8–5.3)
LYMPHOCYTES NFR BLD AUTO: 15 %
MCH RBC QN AUTO: 28.7 PG (ref 26.5–33)
MCHC RBC AUTO-ENTMCNC: 33.5 G/DL (ref 31.5–36.5)
MCV RBC AUTO: 86 FL (ref 78–100)
MONOCYTES # BLD AUTO: 0.6 10E3/UL (ref 0–1.3)
MONOCYTES NFR BLD AUTO: 6 %
NEUTROPHILS # BLD AUTO: 7.2 10E3/UL (ref 1.6–8.3)
NEUTROPHILS NFR BLD AUTO: 75 %
NRBC # BLD AUTO: 0 10E3/UL
NRBC BLD AUTO-RTO: 0 /100
PLATELET # BLD AUTO: 302 10E3/UL (ref 150–450)
POTASSIUM BLD-SCNC: 3.6 MMOL/L (ref 3.4–5.3)
RBC # BLD AUTO: 4.74 10E6/UL (ref 3.8–5.2)
SODIUM SERPL-SCNC: 142 MMOL/L (ref 133–144)
WBC # BLD AUTO: 9.6 10E3/UL (ref 4–11)

## 2022-09-12 PROCEDURE — 97129 THER IVNTJ 1ST 15 MIN: CPT | Mod: GN

## 2022-09-12 PROCEDURE — 92526 ORAL FUNCTION THERAPY: CPT | Mod: GN

## 2022-09-12 PROCEDURE — 97112 NEUROMUSCULAR REEDUCATION: CPT | Mod: GP | Performed by: PHYSICAL THERAPIST

## 2022-09-12 PROCEDURE — 36415 COLL VENOUS BLD VENIPUNCTURE: CPT

## 2022-09-12 PROCEDURE — 250N000011 HC RX IP 250 OP 636

## 2022-09-12 PROCEDURE — 250N000013 HC RX MED GY IP 250 OP 250 PS 637: Performed by: PHYSICAL MEDICINE & REHABILITATION

## 2022-09-12 PROCEDURE — 99232 SBSQ HOSP IP/OBS MODERATE 35: CPT | Mod: CS | Performed by: PHYSICAL MEDICINE & REHABILITATION

## 2022-09-12 PROCEDURE — G0463 HOSPITAL OUTPT CLINIC VISIT: HCPCS

## 2022-09-12 PROCEDURE — 97130 THER IVNTJ EA ADDL 15 MIN: CPT | Mod: GN

## 2022-09-12 PROCEDURE — 250N000013 HC RX MED GY IP 250 OP 250 PS 637

## 2022-09-12 PROCEDURE — 97535 SELF CARE MNGMENT TRAINING: CPT | Mod: GO

## 2022-09-12 PROCEDURE — 85025 COMPLETE CBC W/AUTO DIFF WBC: CPT

## 2022-09-12 PROCEDURE — 128N000003 HC R&B REHAB

## 2022-09-12 PROCEDURE — 80048 BASIC METABOLIC PNL TOTAL CA: CPT

## 2022-09-12 RX ADMIN — Medication 5 MG: at 20:57

## 2022-09-12 RX ADMIN — LISINOPRIL 20 MG: 20 TABLET ORAL at 20:56

## 2022-09-12 RX ADMIN — CLOPIDOGREL BISULFATE 75 MG: 75 TABLET, FILM COATED ORAL at 08:35

## 2022-09-12 RX ADMIN — PANTOPRAZOLE SODIUM 40 MG: 40 TABLET, DELAYED RELEASE ORAL at 05:15

## 2022-09-12 RX ADMIN — ATORVASTATIN CALCIUM 80 MG: 80 TABLET, FILM COATED ORAL at 20:56

## 2022-09-12 RX ADMIN — HEPARIN SODIUM 5000 UNITS: 5000 INJECTION, SOLUTION INTRAVENOUS; SUBCUTANEOUS at 08:35

## 2022-09-12 RX ADMIN — HEPARIN SODIUM 5000 UNITS: 5000 INJECTION, SOLUTION INTRAVENOUS; SUBCUTANEOUS at 20:56

## 2022-09-12 RX ADMIN — AMLODIPINE BESYLATE 5 MG: 5 TABLET ORAL at 08:35

## 2022-09-12 RX ADMIN — ASPIRIN 81 MG: 81 TABLET, COATED ORAL at 08:34

## 2022-09-12 ASSESSMENT — ACTIVITIES OF DAILY LIVING (ADL)
ADLS_ACUITY_SCORE: 46

## 2022-09-12 NOTE — PLAN OF CARE
Skilled set up on :  Pt dependent for proper placement of electrodes on L quads, hamstrings, gastroc, and anterior tib for optimum muscle contraction, safe positioning on w/c within frame and cushion for prevention of skin breakdown, feet secured to foot pedals, w/c secured to  w/ Q-straints.  Passive motion assessed to ensure proper positioning.      Pt performed 37 minutes of active FES ergometry with 100% stimulation applied to above muscles at ~40rpm with 0.91nm resistance.  This PT adjusted e-stim and cycling parameters in real-time to ensure palpable muscle contractions throughout session.  Please see [email or web address] for further details on patient's stimulation parameters and ergometry outcomes.      Changes in parameters that were part of this treatment:   -pulse width (decreased in HS for comfort)  -amplitude (decreased in HS for comfort)    Functional outcomes from this intervention include:   -reduced spasticity  -improved sensory awareness and proprioception  -improved muscle strength  -improved motor coordination    Session Summary:   -Average Power: 1.4 W  -Asymmetry: Right 2%  -Active Minutes: 15:15

## 2022-09-12 NOTE — PLAN OF CARE
Goal Outcome Evaluation:    Plan of Care Reviewed With: patient     Overall Patient Progress: improving    Outcome Evaluation: Visited patient bedside. Discussed interventions, providing tray set up and once daily ensures. See 9/12 note for full assessment. RD will follow per protocol.    Chelly Mcghee, MPH, Dietitian   [facility] pager: [phone]

## 2022-09-12 NOTE — PROGRESS NOTES
[facility] Nurse Inpatient Assessment     Consulted for: left breast and sacrum     Patient History (according to provider note(s):      Stephani Garcia is a 67 year old left hand dominant female with PMHx Hypertension, Hyperlipidemia, vertigo, GILDA (not using CPAP). She presented to [facility] ED 8/30/22 with stroke symptoms of left-sided partial hemiparesis and mild dysarthria/expressive aphasia on morning of, onset was the day prior. TPA/ thrombectomy not done due to unknown last normal, and lack of large vessel occlusion only 30% stenosis of right ICA on CTA. Brain MRI showed ischemic stroke with a small cluster of recent infarcts in the right frontal lobe and right basal ganglia. She also tested positive for asymptomatic COVID infection on admission. Had a recent breast mass biopsy 9/2, with pathology results pending but very concerning for cancer, this was following a notable suspicious breast mass which has grown quickly and has begun to bleed and is causing skin compromise . She was a very poor candidate for surgery because of the size of the mass and because of her recent stroke.     During acute hospitalization, patient was seen and evaluated by PT and OT, who collectively recommended that patient would benefit from ongoing therapies in the acute inpatient rehabilitation setting.      In review of the therapy notes:   She was participating in PT/OT/SLP needs and making progress. VFSS was completed 9/2 and pt demostrated silent aspiration of mildly thick liquids so placed on regular diet with moderately thick liquids. Demonstrated higher level word finding difficulty in conversation with difficulty expressing complex thoughts; Cognition needs further assessment for safety. She performed supine<>sit with max A x 2 and sit scoot with mod A x 2 with assist for trunk alignment and assist with the task. She tends to lean to the L with all sitting and standing  tasks. Completed STS with Max Ax2 using arm-in-arm technique, able to tolerate standing for ~1 min. Unable to attempt transfer to recliner/[facility] at this time d/t impaired balance and weakness. She required Mod A for balance following standing task d/t fatigue. Also required Max Ax2 for bed mobility EOB>supine. She is dependent for donning socks and min A using washcloth while reclined in bed to complete facial hygiene.      Upon arrival to the rehab unit:  She endorses fatigue with the transfer but is otherwise ok. She says she would like to work on strengthening her L. Side, but understands it might take some time. In the mean time her goal is to use her R. Side as much as possible. She has no other symptoms pertaining to her covid infection. She did have some recent loose BM 9/4 but only once.      She denies current headache, N/V, changes in vision, CP, SOB, abdominal discomfort, dysuria or other LUTS symptoms.    Areas Assessed:      Areas visualized during today's visit: Focused:, Sacrum/coccyx and left chest    Wound location: left breast     9/6          Last photo: 9/12/22  Wound due to: Malignancy  Wound history/plan of care: pt reported having wound for 3 months to WOC and for 1 month to RN. Pt had ABD that was adherent to wound without any non adhesive contact layer in place.   Wound base: 100 % non-granular tissue     Palpation of the wound bed: normal      Drainage: copious     Description of drainage: bloody     Measurements (length x width x depth, in cm): ~3  x 3  x  0.1 cm hard to measure secondary to bleeding.      Tunneling: N/A     Undermining: N/A  Periwound skin: Intact      Color: normal and consistent with surrounding tissue      Temperature: normal   Odor: none  Pain: denies , none  Pain interventions prior to dressing change: N/A  Treatment goal: Simplify plan of care  STATUS: stable  Supplies ordered: supplies stored on unit     Wound location: left intergluteal cleft    Last photo:  9/6  Wound due to: Unknown Etiology, not consistent with pressure   Wound history/plan of care: mepilex in place on initial assessment   Wound base: 100 % epidermis     Palpation of the wound bed: normal      Drainage: none     Description of drainage: none     Measurements (length x width x depth, in cm): no open areas noted     Tunneling: N/A     Undermining: N/A  Periwound skin: Erythema- blanchable      Color: pink      Temperature: normal   Odor: none  Pain: no grimacing or signs of discomfort, none  Pain interventions prior to dressing change: N/A  Treatment goal: Protection  STATUS: resurfaced   Supplies ordered: supplies stored on unit     Treatment Plan:     Buttock wound(s): Every 3 days  Cleanse with wound cleanser and pat dry. Paint renee wound skin with no sting. Conform mepilex over area.     Left chest wound(s): Daily cleanse with wound cleanser and pat dry. GENTLY remove dressing, if sticking use saline or wound cleanser to lift dressing (wound bleeds VERY EASILY). Cover with Adaptic/ vaseline gauze double or triple layered and then with layer of Telfa. Secure with 2 large primipore or Medipore tape.   **ensure not to bump wound (bleeds easy)  **if bleeding occurs use quick clot and hold pressure for 3-5 mins to ensure bleeding stops. Leave quick clot in place and moisten prior to next dressing change.   ** if wound will not stop bleeding contact provider, wound may need to be cauterized or stitched.     Orders: Reviewed    RECOMMEND PRIMARY TEAM ORDER: None, at this time  Education provided: plan of care and wound progress  Discussed plan of care with: Patient and Nurse  WO nurse follow-up plan: weekly  Notify WOC if wound(s) deteriorate.  Nursing to notify the Provider(s) and re-consult the WOC Nurse if new skin concern.    DATA:     Current support surface: Standard  Atmos Air mattress  Containment of urine/stool: Brief and Incontinent pad in bed  BMI: Body mass index is 26.81 kg/m .   Active diet  order: Orders Placed This Encounter      Combination Diet Regular Diet; Liquidized/Moderately Thick (level 3)     Output: I/O last 3 completed shifts:  In: 480 [P.O.:480]  Out: -      Labs:   Recent Labs   Lab 09/12/22  0630   HGB 13.6   WBC 9.6     Pressure injury risk assessment:   Sensory Perception: 3-->slightly limited  Moisture: 3-->occasionally moist  Activity: 2-->chairfast  Mobility: 2-->very limited  Nutrition: 3-->adequate  Friction and Shear: 2-->potential problem  Jan Score: 15    Mai Carbone RN CWOCN   Dept. Pager: [phone]  Dept. Office Number: [phone]

## 2022-09-12 NOTE — PROGRESS NOTES
CLINICAL NUTRITION SERVICES - ASSESSMENT NOTE     Nutrition Prescription    RECOMMENDATIONS FOR MDs/PROVIDERS TO ORDER:  None at this time.     Malnutrition Status:    Patient does not meet two of the established criteria necessary for diagnosing malnutrition but is at risk for malnutrition.     Recommendations already ordered by Registered Dietitian (RD):  1. Tray set up with meals   2. Ensure (chocolate or strawberry) once daily at lunch     Future/Additional Recommendations:  Monitor appetite, PO intake, and weights.        REASON FOR ASSESSMENT  Stephani Gracia is a/an 67 year old female assessed by the dietitian for LOS/Nutrition risk monitoring.     PMH  Pt presents with a PMHx Hypertension, Hyperlipidemia, vertigo, GILDA (not using CPAP). She presented to [facility] 8/30/22 with stroke symptoms of left-sided partial hemiparesis and mild dysarthria/expressive aphasia on morning of, onset was the day prior. TPA/ thrombectomy not done due to unknown last normal, and lack of large vessel occlusion only 30% stenosis of right ICA on CTA. Brain MRI showed ischemic stroke with a small cluster of recent infarcts in the right frontal lobe and right basal ganglia.    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  Due to her stroke she is not a candidate for surgery right now.      NUTRITION HISTORY  Pt has no nutrition history in her chart.     Visited with patient at bedside. She noted that since admission her appetite has been poor, and she is often not hungry when she eats. She reported that eating does not seem appealing here, and that she feels she is less hungry because she is more sedentary in the hospital/is growing tired of the menu.     She said an additional barrier is her left side weakness 2/2 stroke, since she is left hand dominant this can make eating more challenging. When she remembers to she asks staff to help set up her tray,  "but sometimes forgets. Writer educated patient on availability of Tray Set-Up, patient was amenable. Pt agreed that she would benefit from an oral nutrition supplement to optimize intake, especially on days she has a poor appetite. Writer encouraged pt to continue eating as able, and that family can bring outside foods to assist with menu fatigue.     CURRENT NUTRITION ORDERS  Diet: Regular, liquidized/moderatly thick (level 3)   Intake/Tolerance: Variable, 25-75% per I/Os    LABS  Labs reviewed    MEDICATIONS  Lipitor daily   Protonix daily     ANTHROPOMETRICS  Height: 170.2 cm (5' 7\")  Most Recent Weight: 77.7 kg (171 lb 3.2 oz)    IBW: 61.6 kg  %IBW: 126%  BMI: Overweight BMI 25-29.9  Weight History:   09/05/22 1107 77.7 kg (171 lb 3.2 oz) Bed scale   Pt with a limited wt history, difficult to assess.     Dosing Weight: 66 kg AdjBW    ASSESSED NUTRITION NEEDS  Estimated Energy Needs: 1233-8067 kcals/day (25 - 30 kcals/kg)  Justification: Maintenance  Estimated Protein Needs: 53-66 grams protein/day (0.8 - 1 grams of pro/kg)  Justification: Maintenance  Estimated Fluid Needs: 1 mL/kcal  Justification: Maintenance    PHYSICAL FINDINGS  See malnutrition section below.    MALNUTRITION  % Intake: No decreased intake noted  % Weight Loss: Unable to assess  Subcutaneous Fat Loss: None observed  Muscle Loss: None observed  Fluid Accumulation/Edema: None noted  Malnutrition Diagnosis: Patient does not meet two of the established criteria necessary for diagnosing malnutrition but is at risk for malnutrition.     NUTRITION DIAGNOSIS  Predicted inadequate nutrient intake (kcal, protein) related to menu fatigue as evidenced LOS and patient report.       INTERVENTIONS  Implementation  Nutrition Education: Provided education on role of RD, availability of nutrition services/supplements, tips for avoiding menu fatigue    Medical food supplement therapy: Ensure (chocolate/strawberry) once daily at lunch    Goals  Patient to " consume % of nutritionally adequate meal trays TID, or the equivalent with supplements/snacks.     Monitoring/Evaluation  Progress toward goals will be monitored and evaluated per protocol.    Chelly Mcghee, MPH, Dietitian  [facility] pager: [phone]

## 2022-09-12 NOTE — PLAN OF CARE
Pt sleeps well through the night. No c/o pain. No s/s of SOB and chest pain noted. Alert and oriented. Difficulty in word finding. Random bladder scan at 0540: 224ml. Lea steady transfer. Pure wick on. Call light within reach. Bed alarm on for safety. Will continue POC.        Patient's most recent vital signs are:     Vital signs:  BP: 149/88  Temp: 95.8  HR: 85  RR: 16  SpO2: 95 %     Patient does not have new respiratory symptoms.  Patient does not have new sore throat.  Patient does not have a fever greater than 99.5.

## 2022-09-12 NOTE — PLAN OF CARE
Discharge Planner Post-Acute Rehab SLP:     Discharge Plan: Ongoing SLP, HH vs OP ?    Precautions: aspiration     Current Status:  Communication: Word finding difficulties in conversation   Cognition: Mild-mod cognitive impairments with deficits re: memory, attention, reasoning, language. Benefits from extra time   Swallow: Regular, moderately thick (3) liquid. Upright fully all PO. Medications given as tolerated with 3 liquid or puree.     Assessment:  Completed oral cares with pt. Pt accepted trial of mildly thick (2) liquid by tsp following FWP guidelines. Pt tolerated tsp trials w/o overt s/sx aspiration. Single instance delayed throat clear following single small sip by cup. cannot r/o silent aspiration at bedside and known hx per VFSS. Pt able to recall pharyngeal  exercises with min cues. Pt reported to not be completing IND recommended #. SLP provided education rE: need to complete high volume close to recommended total. Pt engaged in reps of exercises for CTAR + effortful swallow (x20) with min cues. Reviewed strategies for word finding. Pt reporting ongoing difficulty implementing into situations but recalls needing to. Pt participated in verbal task implementing word finding strategies given prompt. Able to complete with 100% accuracy min cues    Other Barriers to Discharge (Family Training, etc): family training education re: thickened liquids (pending improvement) and IADL support

## 2022-09-12 NOTE — PROGRESS NOTES
"  [facility]   Acute Rehabilitation Unit  Daily progress note    INTERVAL HISTORY  Stephani Garcia was seen and examined at bedside.  No acute events reported overnight.  LBM 9/11.  Pt doing well this morning, stated slept better overnight compared to previous nights.  Notes feeling slightly anxious regarding staff changing going on.  Encouraged patient that things should remain normal, and that main focus should continue to be on therapies.      Patient denies headache, fever, chills, shortness of breath, chest pain, abdominal pain, nausea, vomiting, and diarrhea.    Functionally, Patient continues to be a full participant with therapies.  With physical therapy pt attended a falls prevention class yesterday.  With occupational therapy pt worked with FES cycling.  With SLP pt performed mental flexibility task and word retrieval 90% independently, requiring 1 cue for improvement to 100% completion.     MEDICATIONS    amLODIPine  5 mg Oral Daily     aspirin  81 mg Oral Daily     atorvastatin  80 mg Oral QPM     clopidogrel  75 mg Oral Daily     heparin ANTICOAGULANT  5,000 Units Subcutaneous Q12H     lisinopril  20 mg Oral At Bedtime     melatonin  5 mg Oral At Bedtime     pantoprazole  40 mg Oral QAM AC        acetaminophen, hydrALAZINE, polyethylene glycol, senna-docusate     PHYSICAL EXAM  BP (!) 149/88 (BP Location: Right arm)   Pulse 85   Temp (!) 95.8  F (35.4  C) (Oral)   Resp 16   Ht 1.702 m (5' 7\")   Wt 77.7 kg (171 lb 3.2 oz)   SpO2 95%   BMI 26.81 kg/m    General: No acute distress, resting comfortably in bed  HEENT: NC/NT, Moist mucous membranes  Pulmonary: Breathing comfortably on room air, clear to auscultation bilaterally  Cardiovascular: Regular rate and rhythm  Abdominal: Non-tender, non-distended, bowel sounds present in all four quadrants   Extremities: warm, well perfused, no edema in bilateral lower extremities, no tenderness in calves  Neuro/MSK: Alert " and oriented, face symmetric, subtle word finding difficulties, 1+ MAS L elbow flexion, moving RUE and RLE 5/5    LABS  Recent Results (from the past 24 hour(s))   CBC with platelets and differential    Collection Time: 09/12/22  6:30 AM   Result Value Ref Range    WBC Count 9.6 4.0 - 11.0 10e3/uL    RBC Count 4.74 3.80 - 5.20 10e6/uL    Hemoglobin 13.6 11.7 - 15.7 g/dL    Hematocrit 40.6 35.0 - 47.0 %    MCV 86 78 - 100 fL    MCH 28.7 26.5 - 33.0 pg    MCHC 33.5 31.5 - 36.5 g/dL    RDW 12.8 10.0 - 15.0 %    Platelet Count 302 150 - 450 10e3/uL    % Neutrophils 75 %    % Lymphocytes 15 %    % Monocytes 6 %    % Eosinophils 3 %    % Basophils 0 %    % Immature Granulocytes 1 %    NRBCs per 100 WBC 0 <1 /100    Absolute Neutrophils 7.2 1.6 - 8.3 10e3/uL    Absolute Lymphocytes 1.4 0.8 - 5.3 10e3/uL    Absolute Monocytes 0.6 0.0 - 1.3 10e3/uL    Absolute Eosinophils 0.3 0.0 - 0.7 10e3/uL    Absolute Basophils 0.0 0.0 - 0.2 10e3/uL    Absolute Immature Granulocytes 0.1 <=0.4 10e3/uL    Absolute NRBCs 0.0 10e3/uL       Rehabilitation - continue comprehensive acute inpatient rehabilitation program with multidisciplinary approach including therapies, rehab nursing, and physiatry following. See interval history for updates.      ASSESSMENT AND PLAN  Stephani Garcia is a 67 year old left hand dominant female with PMHx HTN, HLD, vertigo, GILDA (not using CPAP). Admitted for acute ischemic infarcts with right frontal lobe and right basal ganglia involvement. Has functional deficits of weak L. shoulder shrug, L. Hemiparesis, dysphagia, dysarthria, possbile neurogenic bowel/bladder and cognitive deficits. She also has possible L. Breast CA pending pathology results. She is admitted to undergo therapies with PT/OT/SLP.    Admission to acute inpatient rehab: 9/5/22  Impairment group code: 01.1  Stroke Ischemic  (L) Body Involvement (R) Brain: small cluster of recent infarcts in the right frontal lobe and right basal ganglia.      #  "Acute ischemic stroke   # Recent infarcts in the right frontal lobe and right basal ganglia  Noted left-sided partial hemiparesis and mild dysarthria/expressive aphasia at presentation 8/30. MRI confirmed  Acute ischemic stroke, and TPA/ thrombectomy 2/2 unknown last known normal. CTA showed no large vessel occlusion, with only 30% of ICA occlusion. Echo was normal EF 65% on 8/31. No Afib noted with telemetry.  at baseline. BP goal is <140/90 at rest, anticipate increase with therapies.   - Continue Plavix 75 mg and aspirin 81 mg for 90 days(Through 11/30) then switch to ASA 325mg mono therapy   - Continue Atorvastatin 80 mg at bedtime.   - PT/OT/SLP     # Dysphagia    Noted during Video swallow study on 9/2/22: \"Patient had direct, silent aspiration with mildly thick liquid on first trial and deep penetration with mildly thick liquids on subsequent trials. No aspiration or penetration with moderately thick liquids.   - Continue dysphagia diet with moderately thick fluids  - SLP to evaluate     # Left Breast mass  Has had a FNA 9/2 by surgery. There is a high suspicion for breast CA. Surgery rec'd neoadjuvent treatment with hopes of shrinking tumor prior to surgery. Unable to surgically operate due to stroke at the moment.   - Needs referral to oncology for outpatient follow-up.  --9/7 biopsy results: ductal carcinoma and estrogen and progesterone positive.  Due to her stroke she is not a candidate for surgery right now.    - [facility]  - [facility] consult: appreciate recs 9/6      # Hyperlipidemia    at baseline.  - statin high-dose as above     # Hypertension  Was allowed permissive HTN, first 24 hours but started to bring it down slowly. Slowly decrease BP, with goal to keep <140/90 at rest.  - Continue lisinopril 20 mg daily  - Started Amlodipine 5 mg Daily 9/6  - Started Hydralazine 10 mg PRN 9/6  - Monitor BP anticipate some increases during the therapy " sessions.       #GILDA (obstructive sleep apnea):  Patient does not use CPAP at baseline. May need repeat sleep study and eval by sleep medicine team.   - encourage using CPAP and call RT for trial     # Asymptomatic COVID 19 Infection    Positive Covid test on 8/3 with preceding cold symptoms for 3 days prior. Currently she continues remain asymptomatic from COVID symptoms on 9/5. She has no cough, wheezing, SOB, fever, chills, body aches or any type of cold symptoms.  She has completed remdesivir x 3 days.  Off of quarantine     # Neurogenic Bladder  - Timed voiding  - purewick HS     # Bowel   Noted mild loose stools 9/4  - PRN Senna and Miralax     # Sleep adjustment  - Melatonin 5 mg at bedtime     6. Adjustment to disability:  Clinical psychology to eval and treat. Consult sent 9/6 pending review.   7. FEN: Regular Diet Adult Liquidized/Moderately Thick (level 3)  8. Bowel: PRN Senna and Miralax  9. Bladder: Timed voiding  10. DVT Prophylaxis: Heparin 5000 units for DVT ppx. Plavix 75 mg daily and ASA 81 mg 90 days then ASA alone.   11. GI Prophylaxis: Pantoprazole 40 mg   12. Code: Full  13. Disposition: Home  14. ELOS:  9/26/2022  15. Rehab prognosis:  Fair  16. Follow up Appointments on Discharge:   - PCP follow up 7-14 days post discharge  - Follow-up with neurology in 6 to 8 weeks  - Referral for oncology given biopsy results   - GILDA: outpatient referral to sleep medicine clinic if agreeable         Patient seen and discussed with Dr. Marcos, PM&R Staff Physician    Jam Borden DO  PGY3 PM&R Resident

## 2022-09-12 NOTE — PROGRESS NOTES
Discharge Planner Post-Acute Rehab OT:     Discharge Plan: home with spouse A, HC OT vs OP OT pending progress    Precautions: falls, dense L jong, monitor BP, LUE omoneurexa OOB for LUE mgmt     Current Status:  ADLs:    Mobility: Ax1 SaraStedy with therapies only. Recommend Ax2 SaraStedy with nsg.    Grooming: SBA EOB wash face, Max A wash hands. Set up/clean up oral cares.    Dressing: UB- Mod A. LB - Max A shorts rolling and reacher. Feet - Don socks SBA RLE, A to hold LLE in fig four.    Bathing: Max A sponge bathing. Transfer N/A d/t isolation precautions.     Toileting: Transfer SaraStedy <> commode overlay. Total A cares and clothing mgmt. Recommend Ax2 nsg.  IADLs: IND PTA. Anticipate A at discharge.  Vision/Cognition: L strabismus baseline with pt reporting, pt reports worse vision in L eye at baseline. L inattention vs vision deficits noted in functional tasks. Functional deficits problem solving, sequencing, and wordfinding.    Assessment: Gradually progressing dressing with jong techniques and use of AE, pt demonstrates good carryover of strategies. Noted increase in LUE proximal shoulder strength during functional tasks; however continues to require significant assist.     Other Barriers to Discharge (DME, Family Training, etc): All needs met on main level.     DME: TBD    Family training - Not scheduled as of 9/6/22.

## 2022-09-12 NOTE — PLAN OF CARE
Patient is alert and oriented . No s/s of respiratory distress noted. Patient denies pain/discomfort at time of assessment. Bed placed in the lowest position with bed alarm on. No adverse reactions to medications noted. Safety and comfort measures maintained,call light within reach.

## 2022-09-13 ENCOUNTER — APPOINTMENT (OUTPATIENT)
Dept: OCCUPATIONAL THERAPY | Facility: CLINIC | Age: 67
DRG: 056 | End: 2022-09-13
Attending: PHYSICAL MEDICINE & REHABILITATION
Payer: COMMERCIAL

## 2022-09-13 ENCOUNTER — APPOINTMENT (OUTPATIENT)
Dept: PHYSICAL THERAPY | Facility: CLINIC | Age: 67
DRG: 056 | End: 2022-09-13
Attending: PHYSICAL MEDICINE & REHABILITATION
Payer: COMMERCIAL

## 2022-09-13 ENCOUNTER — APPOINTMENT (OUTPATIENT)
Dept: SPEECH THERAPY | Facility: CLINIC | Age: 67
DRG: 056 | End: 2022-09-13
Attending: PHYSICAL MEDICINE & REHABILITATION
Payer: COMMERCIAL

## 2022-09-13 PROCEDURE — 250N000013 HC RX MED GY IP 250 OP 250 PS 637: Performed by: PHYSICAL MEDICINE & REHABILITATION

## 2022-09-13 PROCEDURE — 250N000011 HC RX IP 250 OP 636

## 2022-09-13 PROCEDURE — 128N000003 HC R&B REHAB

## 2022-09-13 PROCEDURE — 99233 SBSQ HOSP IP/OBS HIGH 50: CPT | Mod: GC | Performed by: PHYSICAL MEDICINE & REHABILITATION

## 2022-09-13 PROCEDURE — 97112 NEUROMUSCULAR REEDUCATION: CPT | Mod: GO

## 2022-09-13 PROCEDURE — 999N000125 HC STATISTIC PATIENT MED CONFERENCE < 30 MIN

## 2022-09-13 PROCEDURE — 250N000013 HC RX MED GY IP 250 OP 250 PS 637

## 2022-09-13 PROCEDURE — 97116 GAIT TRAINING THERAPY: CPT | Mod: GP | Performed by: PHYSICAL THERAPIST

## 2022-09-13 PROCEDURE — 250N000013 HC RX MED GY IP 250 OP 250 PS 637: Performed by: STUDENT IN AN ORGANIZED HEALTH CARE EDUCATION/TRAINING PROGRAM

## 2022-09-13 PROCEDURE — 90791 PSYCH DIAGNOSTIC EVALUATION: CPT | Mod: GT | Performed by: PSYCHOLOGIST

## 2022-09-13 PROCEDURE — 97530 THERAPEUTIC ACTIVITIES: CPT | Mod: GP | Performed by: PHYSICAL THERAPIST

## 2022-09-13 PROCEDURE — 999N000150 HC STATISTIC PT MED CONFERENCE < 30 MIN

## 2022-09-13 PROCEDURE — 92526 ORAL FUNCTION THERAPY: CPT | Mod: GN

## 2022-09-13 RX ORDER — TRAZODONE HYDROCHLORIDE 50 MG/1
50 TABLET, FILM COATED ORAL AT BEDTIME
Status: DISCONTINUED | OUTPATIENT
Start: 2022-09-13 | End: 2022-09-13

## 2022-09-13 RX ADMIN — CLOPIDOGREL BISULFATE 75 MG: 75 TABLET, FILM COATED ORAL at 08:39

## 2022-09-13 RX ADMIN — HEPARIN SODIUM 5000 UNITS: 5000 INJECTION, SOLUTION INTRAVENOUS; SUBCUTANEOUS at 20:27

## 2022-09-13 RX ADMIN — ASPIRIN 81 MG: 81 TABLET, COATED ORAL at 08:39

## 2022-09-13 RX ADMIN — PANTOPRAZOLE SODIUM 40 MG: 40 TABLET, DELAYED RELEASE ORAL at 06:25

## 2022-09-13 RX ADMIN — ATORVASTATIN CALCIUM 80 MG: 80 TABLET, FILM COATED ORAL at 20:27

## 2022-09-13 RX ADMIN — Medication 25 MG: at 21:29

## 2022-09-13 RX ADMIN — HEPARIN SODIUM 5000 UNITS: 5000 INJECTION, SOLUTION INTRAVENOUS; SUBCUTANEOUS at 08:39

## 2022-09-13 RX ADMIN — LISINOPRIL 20 MG: 20 TABLET ORAL at 20:31

## 2022-09-13 RX ADMIN — Medication 5 MG: at 20:27

## 2022-09-13 RX ADMIN — AMLODIPINE BESYLATE 5 MG: 5 TABLET ORAL at 08:39

## 2022-09-13 ASSESSMENT — ACTIVITIES OF DAILY LIVING (ADL)
ADLS_ACUITY_SCORE: 46
ADLS_ACUITY_SCORE: 50
ADLS_ACUITY_SCORE: 46
ADLS_ACUITY_SCORE: 46

## 2022-09-13 NOTE — CONSULTS
"Start Time: 2:00 pm  Stop Time: 2:35 pm  Session Duration in Minutes: 35  Patient was seen virtually ([employer] cart or other videoconferencing)    REASON FOR CONSULTATION: Psychology consulted to assess adjustment to illness.    SOURCES OF INFORMATION: Information was obtained from a clinical interview with the patient and review of the medical record.    HISTORY OF PRESENT ILLNESS: Per H&P, Stephani Garcia is a 67 year old female with PMHx Hypertension, Hyperlipidemia, vertigo, GILDA (not using CPAP). Admitted for acute ischemic infarcts with right frontal lobe and right basal ganglia involvement. Has functional deficits of weak L. Shoulder shrug, L. Hemiparesis, dysphagia, dysarthria, possbile neurogenic bowel/bladder and cognitive deficits. She also has possible L. Breast CA pending pathology results. She was admitted to acute inpatient rehabilitation 22.     PAST MEDICAL HISTORY: See below lists for past medical history, past surgical history, and current medications.    Past Medical History:   Diagnosis Date     Hyperlipidemia 2009     Hypertension 2009     Infection due to 2019 novel coronavirus 2022     IGLDA (obstructive sleep apnea) 2011    Formatting of this note might be different from the original. Split-Night Polysomnogram Interpretation  Date of read:  2011  Estimated Body mass index is 26.94 kg/(m^2) as calculated from the following:   Height as of 11: 5' 7\"(1.702 m).   Weight as of 11: 172 lb(78.019 kg). Total [place] Score: (not recorded) Neck Circumference (in inches): 13   Baseline: This study was performed in       Past Surgical History:   Procedure Laterality Date      SECTION         Current Facility-Administered Medications   Medication     acetaminophen (TYLENOL) tablet 650 mg     amLODIPine (NORVASC) tablet 5 mg     aspirin EC tablet 81 mg     atorvastatin (LIPITOR) tablet 80 mg     clopidogrel (PLAVIX) tablet 75 mg     heparin ANTICOAGULANT injection " 5,000 Units     hydrALAZINE (APRESOLINE) tablet 10 mg     lisinopril (ZESTRIL) tablet 20 mg     melatonin tablet 5 mg     pantoprazole (PROTONIX) EC tablet 40 mg     Patient is already receiving anticoagulation with heparin, enoxaparin (LOVENOX), warfarin (COUMADIN)  or other anticoagulant medication     polyethylene glycol (MIRALAX) Packet 17 g     senna-docusate (SENOKOT-S/PERICOLACE) 8.6-50 MG per tablet 1-4 tablet       PSYCHIATRIC HISTORY: Stephani denied a significant psychiatric history including depression, anxiety, and bipolar disorder. She states she has never seen a psychologist or psychiatrist and has never taken psychotropic medication.    BRIEF PSYCHOSOCIAL HISTORY: Stephani is  and lives with her  and one of her sons. She has 2 sons ages 22 and 27. 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 She states she was planning to retire in January. Stephani reports occasional alcohol use. She denies use or tobacco and other substances. Stephani's primary source of social support is her .     MENTAL STATUS:    Appearance/Behavior/Orientation: Alert and oriented to person, place, time, and situation. No evidence of psychomotor agitation.    Cooperation/Reliability: Patient appeared to honestly respond to questions about psychosocial functioning and is deemed a reliable historian.   Cognition/Memory/Judgment: Not formally assessed, yet some difficulties apparent upon interview. Fund of knowledge consistent with age, level of education, and life experience. Abstract reasoning appropriate, no difficulties with judgment apparent. Patient with word finding difficulties.  Speech/Language: Speech was clear, logical and coherent, of normal rate, rhythm and volume.   Thought Content/Form: Appropriate to interview and situation. Overall logical and organized.  Mood/Affect: Depressed; affect was flat  Insight/Motivation: Appropriate to situation.    Suicide/Assault: Patient denies suicidal or assaultive ideation, plan, or intent.    PRESENTING PROBLEM: Stephani was seen laying in her hospital bed during today's visit. She was awake, but fatigued during the interview, often closing her eyes. Stephani described her mood as down and also expressed feeling anxious and worried. She states these feelings stem from feeling as if her financial affairs are not in order and that her  and sons would not be taken care of if something happened to her. Stephani also described poor sleep, particularly the past 3-4 nights. She described her appetite as fair and denied any pain. Overall, Stephani states she is frustrated with her progress in therapy pertaining to regaining function in her left (dominant) arm.      IMPRESSION: Based on the interview the patient presents with symptoms consistent with Adjustment Disorder with mixed anxiety and depressed mood, which may be exacerbated by poor sleep.     THERAPEUTIC INTERVENTION: Therapeutic intervention consisted of building therapeutic rapport, active listening, and learning coping skills.    DIAGNOSIS: Adjustment Disorder with mixed anxiety and depressed mood.    RECOMMENDATION/PLAN: (1) Psychology will follow-up with patient at least once per week throughout her acute inpatient rehabilitation stay. (2) Recommend the team consider medication to assist with sleep as this may be exacerbating the patient's depressed mood and anxiety. (3) Consider starting antidepressant to target symptoms of anxiety and depression.     Soheila Torres, PhD   and Licensed Psychologist

## 2022-09-13 NOTE — PLAN OF CARE
Discharge Planner Post-Acute Rehab PT:      Discharge Plan: Home with  assist, 1 DAVE without rail. Home care vs OP PT, pending progress.     Precautions: Falls, L hemiplegia, dysphagia, breast CA     Current Status:  Bed Mobility: Variable SBA<>Min A supine<>sit for trunk and L LE management  Transfer: Ax1 SaraStedy with nsg. Min A with skilled set-up for squat pivot L/R  Gait: up to 20 ft, mod A for trunk and L LE mgmt at R rail + WC follow  Stairs: Unsafe  WC Propulsion: SBA- cues for safety with turning  Balance: Fair static/dynamic seated balance- LOB to the L primarily  PASS on 9/6: 13/36             on 9/13: 21/36     Assessment: PASS significantly improved from 13>>21/36 over the past week. With addition of shoes, able to self-propel manual WC room<>gym with relative ease, although does require intermittent cues to avoid running into objects especially when turning. Gaining IND with squat pivot transfer to both directions (min A); however, requires consistent and significant cueing for safe set-up of WC.     Other Barriers to Discharge (DME, Family Training, etc):   Equipment: Anticipate K4 WC, quad cane, gait belt, L Eunice Neurexa, L AFO  Family training to be scheduled.

## 2022-09-13 NOTE — PROGRESS NOTES
"  [facility]   Acute Rehabilitation Unit    INTERVAL HISTORY  Stephani reports not sleeping well this morning, and she did not receive her breakfast. She feels very tired, but still wants to try and participate with therapies. Discussed pt's botox progress with family at bedside.     Nursing notes reviewed, no acute events overnight.   Continent of bladder, Continent of bowel, Last BM 9/13. Denies chest pain abdominal pain, shortness of breath. No further concerns.    Functionally, see today's care conference note.         MEDICATIONS  Scheduled meds    amLODIPine  5 mg Oral Daily     aspirin  81 mg Oral Daily     atorvastatin  80 mg Oral QPM     clopidogrel  75 mg Oral Daily     heparin ANTICOAGULANT  5,000 Units Subcutaneous Q12H     lisinopril  20 mg Oral At Bedtime     melatonin  5 mg Oral At Bedtime     pantoprazole  40 mg Oral QAM AC       PRN meds:  acetaminophen, hydrALAZINE, polyethylene glycol, senna-docusate      PHYSICAL EXAM  BP (!) 173/81 (BP Location: Right arm)   Pulse 94   Temp (!) 96.3  F (35.7  C) (Oral)   Resp 16   Ht 1.702 m (5' 7\")   Wt 77.7 kg (171 lb 3.2 oz)   SpO2 97%   BMI 26.81 kg/m    Gen: awake, alert  HEENT: moist mucus membranes  Cardio: RRR, no murmur  Pulm: on room air, lungs CTA bilaterally  Abd: soft, nontender, nondistended  Ext: moves BUE freely in bed  Neuro/MSK: reaching outside of midline for snack    LABS  No results found for this or any previous visit (from the past 24 hour(s)).      Rehabilitation - continue comprehensive acute inpatient rehabilitation program with multidisciplinary approach including therapies, rehab nursing, and physiatry following. See interval history for updates.       ASSESSMENT AND PLAN  Stephani Garcia is a 67 year old left hand dominant female with PMHx HTN, HLD, vertigo, GILDA (not using CPAP). Admitted for acute ischemic infarcts with right frontal lobe and right basal ganglia involvement. Has functional " "deficits of weak L. shoulder shrug, L. Hemiparesis, dysphagia, dysarthria, possbile neurogenic bowel/bladder and cognitive deficits. She also has possible L. Breast CA pending pathology results. She is admitted to undergo therapies with PT/OT/SLP.     Admission to acute inpatient rehab: 9/5/22  Impairment group code: 01.1  Stroke Ischemic  (L) Body Involvement (R) Brain: small cluster of recent infarcts in the right frontal lobe and right basal ganglia.       # Acute ischemic stroke   # Recent infarcts in the right frontal lobe and right basal ganglia  Noted left-sided partial hemiparesis and mild dysarthria/expressive aphasia at presentation 8/30. MRI confirmed  Acute ischemic stroke, and TPA/ thrombectomy 2/2 unknown last known normal. CTA showed no large vessel occlusion, with only 30% of ICA occlusion. Echo was normal EF 65% on 8/31. No Afib noted with telemetry.  at baseline. BP goal is <140/90 at rest, anticipate increase with therapies.   - Continue Plavix 75 mg and aspirin 81 mg until 11/30   -aspirin 325mg starting  12/1  -  Atorvastatin 80 mg    - PT/OT/SLP     # Dysphagia    Noted during Video swallow study on 9/2/22: \"Patient had direct, silent aspiration with mildly thick liquid on first trial and deep penetration with mildly thick liquids on subsequent trials. No aspiration or penetration with moderately thick liquids.   - Continue dysphagia diet with moderately thick fluids  - SLP     # Left Breast Ductal Carcinoma   FNA 9/2. There is a high suspicion for breast CA. Surgery rec'd neoadjuvent treatment with hopes of shrinking tumor prior to surgery. Unable to surgically operate due to recent stroke  - Needs referral to oncology for outpatient follow-up.  --9/7 biopsy results: ductal carcinoma and estrogen and progesterone positive.     - Follow up path [facility] per [facility]  - [facility] consult: appreciate recs 9/6      # Hyperlipidemia    at baseline.  - Atorvastatin 80 mg      # " Hypertension  Was allowed permissive HTN, first 24 hours but started to bring it down slowly. Slowly decrease BP, with goal to keep <140/90 at rest.  - Continue lisinopril 20 mg daily  - Started Amlodipine 5 mg Daily 9/6  - Started Hydralazine 10 mg PRN 9/6       #GILDA (obstructive sleep apnea):  Patient does not use CPAP at baseline. May need repeat sleep study and eval by sleep medicine team.   - encourage using CPAP and call RT for trial     # Asymptomatic COVID 19 Infection    Positive Covid test on 8/3 with preceding cold symptoms for 3 days prior. Currently she continues remain asymptomatic from COVID symptoms on 9/5. She has no cough, wheezing, SOB, fever, chills, body aches or any type of cold symptoms.  She has completed remdesivir x 3 days.  Off of quarantine     # Neurogenic Bladder  - Timed voiding  - purewick HS     # Bowel   Noted mild loose stools 9/4  - PRN Senna and Miralax     # Sleep adjustment  - Melatonin 5 mg at bedtime     6. Adjustment to disability:  Clinical psychology to eval and treat. Consult sent 9/6 pending review.   7. FEN: Regular Diet Adult Liquidized/Moderately Thick (level 3)  8. Bowel: PRN Senna and Miralax  9. Bladder: Timed voiding  10. DVT Prophylaxis: Heparin 5000 units for DVT ppx. Plavix 75 mg daily and ASA 81 mg 90 days then ASA alone.   11. GI Prophylaxis: Pantoprazole 40 mg   12. Code: Full  13. Disposition: Home  14. ELOS:  9/26/2022  15. Rehab prognosis:  Fair  16. Follow up Appointments on Discharge:   - PCP follow up 7-14 days post discharge  - Follow-up with neurology in 6 to 8 weeks  - Referral for oncology given biopsy results   - GILDA: outpatient referral to sleep medicine clinic if agreeable         Seen and discussed with Dr. Braun, PM&R staff physician     Corinne Macias,   PGY-2  Physical Medicine and Rehabilitation

## 2022-09-13 NOTE — PROGRESS NOTES
Skilled set up on :  Pt dependent for proper placement of electrodes on LUE for optimum muscle contraction, safe positioning on w/c within frame and cushion for prevention of skin breakdown, UE secured to arm support tough with coban and pillow behind back.  Passive motion assessed to ensure proper positioning.      Pt performed 31 minutes of active FES ergometry with 0-100% stimulation applied to above muscles at 30 rpm with 0.5 nm resistance.  This OT adjusted e-stim and cycling parameters in real-time to ensure palpable muscle contractions throughout session.  Please see [email or web address] for further details on patient's stimulation parameters and ergometry outcomes.      Changes in parameters that were part of this treatment:   -resistance  -pulse width, frequency  -amplitude  -pedal speed      Functional outcomes from this intervention include:   -reduced spasticity  -improved sensory awareness and proprioception  -improved muscle strength  -improved motor coordination      Time 31 minutes 14 seconds  Active 18 minutes 22 seconds  Passive 11 minutes 45 seconds

## 2022-09-13 NOTE — PLAN OF CARE
Pt is currently resting. Alert and oriented x 4. No c/o pain noted. PM medications given and tolerated. VS wnl. Bed alarm on. Call light within reach. Pt remains stable at this time will continue to monitor.

## 2022-09-13 NOTE — PROGRESS NOTES
Discharge Planner Post-Acute Rehab OT:     Discharge Plan: home with spouse A, HC OT vs OP OT pending progress    Precautions: falls, dense L jong, monitor BP, LUE omoneurexa OOB for LUE mgmt     Current Status:  ADLs:    Mobility: Ax1 SaraStedy. SPT Ax1 both directions with therapies only. Recommend Ax1 SaraStedy with nsg.    Grooming: SBA EOB wash face, Max A wash hands. Set up/clean up oral cares.    Dressing: UB- Mod A. LB - Max A shorts rolling and reacher. Feet - Don socks SBA RLE, A to hold LLE in fig four.    Bathing: Max A sponge bathing. Transfer N/A d/t isolation precautions.     Toileting: Transfer SaraStedy <> commode overlay. Total A cares and clothing mgmt. Recommend Ax2 nsg.  IADLs: IND PTA. Anticipate A at discharge.  Vision/Cognition: L strabismus baseline with pt reporting, pt reports worse vision in L eye at baseline. L inattention vs vision deficits noted in functional tasks. Functional deficits problem solving, sequencing, and wordfinding.    Assessment: Pt seen for interdisciplinary rounds, see POC note. Per PT, pt progressing SPT transfers and plan to incorporate into functional transfers with therapy only at this time.    Other Barriers to Discharge (DME, Family Training, etc): All needs met on main level.     DME: TBD    Family training - Not scheduled as of 9/6/22.

## 2022-09-13 NOTE — PLAN OF CARE
Patient is alert and oriented, able to make needs known. Respiration even and unlabored, no s/s of respiratory distress noted. Patient denies pain/discomfort. Dressing to left breast clean, dry and intact. No adverse reactions to medications noted. Bed placed in the lowest position. Safety and comfort measures maintained,call light within reach. Will continue with POC.

## 2022-09-13 NOTE — PROGRESS NOTES
SW met with pt at bedside to provide blank HCD and POA documents along with [place] Stroke Association Resource booklet/info. SW explained each document and answered pt's questions. SW confirmed with pt that a stroke referral had been made as pt requested last week.     SW to continue to follow and assist with discharge plan as appropriate.     COLUMBA Barger  [facility]   [facility]

## 2022-09-13 NOTE — CARE CONFERENCE
Acute Rehab Care Conference/Team Rounds      Type: Team Rounds    Present: Dr. Beto Braun, Corinne Macias Resident MD, Cher Sullivan PT, Richard Chris OT, Hamida Perkins SLP, Yoselin Barkley [facility], Radha Mendez RD, Frances Hill RN, and patient Stephani Garcia.    Discharge Barriers/Treatment/Education    Rehab Diagnosis: Stroke Ischemic 01.1 (L) Body Involvement (R) Brain: small cluster of recent infarcts in the right frontal lobe and right basal ganglia    Active Medical Co-morbidities/Prognosis:   Patient Active Problem List   Diagnosis     Acute ischemic stroke (H)     Hyperlipidemia     Hypertension     GILDA (obstructive sleep apnea)     Infarction of right basal ganglia (H)        Safety:    Pain:    Medications, Skin, Tubes/Lines: WOC following for L breast and sacrum    Swallowing/Nutrition: Regular, moderately thick (3) liquid. Upright fully all PO. Medications given as tolerated with 3 liquid or puree. Repeat VFSS to be completed 9/16 or later pending progress and discharge plan.     Bowel/Bladder:    Psychosocial: Lives in a two story townhouse with spouse. Indep PTA. No drive in last 30 years, family assists. No mental health or substance abuse reported. Good support from  and son, another son lives further away. Works for the [employer] as a . No financial concerns reported.     ADLs/IADLs: Pt making slow, gradual progress in ADLs. Continues to be dependent - max A for majority of ADLs, but demonstrates improved carryover of strategies and motivated to participate. Improvement to LUE proximal shoulder noted. Finney and safety continues to be limited by L-side weakness LUE > LLE, balance deficits, fatigue, and functional cognition deficits. Plan for home with family A and OP OT.    Mobility: Making steady progress, focusing on transfers and FES to improve standing and decrease assist needed for transfers. Recommend K4 for w/c mob at discharge. Current plan to discharge home with HHPT  and  assisting. Goals adjusted to reflect current status and likely progress. Need to confirm if  able to provide Marilynn at discharge for transfers and possibly gait/stairs.  Bed Mobility: Min A supine<>sit for trunk and L LE management  Transfer: Ax2 Lea Kristal with nursing. Squat pivot with therapies only- Min-ModA to the R  Gait: Unsafe  Equipment: Anticipate K4 WC, quad cane, gait belt, L Eunice Neurexa, and will need to order each.    Cognition/Language: Mild-mod cognitive impairments with deficits re: memory, attention, reasoning, language. Benefits from extra time . Excellent insight into deficits and participation in therapy. Will need ongoing SLP     Community Re-Entry: w/c based    Transportation: requires assist from family    Decision maker: self    Plan of Care and goals reviewed and updated.    Discharge Plan/Recommendations    Fall Precautions: continue    Patient/Family input to goals: Yes    Anticipated rehab needs following discharge: Home with family    Anticipated care giver support after discharge: Family    Estimated length of stay: 9/26/22    Overall plan for the patient: Continue IP Rehabilitation.       Utilization Review and Continued Stay Justification    Medical Necessity Criteria:    For any criteria that is not met, please document reason and plan for discharge, transfer, or modification of plan of care to address.    Requires intensive rehabilitation program to treat functional deficits?: Yes    Requires 3x per week or greater involvement of rehabilitation physician to oversee rehabilitation program?: Yes    Requires rehabilitation nursing interventions?: Yes    Patient is making functional progress?: Yes    There is a potential for additional functional progress? Yes    Patient is participating in therapy 3 hours per day a minimum of 5 days per week or 15 hours per week in 7 day period?:Yes    Has discharge needs that require coordinated discharge planning  approach?:Yes          Final Physician Sign off    Statement of Approval: I approve the plan of care.     Patient Goals  Social Work Goals: Confirm discharge recommendations with therapy, coordinate safe discharge plan and remain available to support and assist as needed.    OT Predicted Duration/Target Date for Goal Attainment: 09/26/22  Therapy Frequency (OT): Daily  OT: Hygiene/Grooming: supervision/stand-by assist, within precautions  OT: Upper Body Dressing: Supervision/stand-by assist, within precautions  OT: Lower Body Dressing: Minimal assist, using adaptive equipment, within precautions  OT: Upper Body Bathing: Minimal assist, using adaptive equipment, within precautions  OT: Lower Body Bathing: Minimal assist, using adaptive equipment, with precautions  OT: Bed Mobility: Modified independent, within precautions  OT: Transfer: Supervision/stand-by assist, with assistive device, within precautions  OT: Toilet Transfer/Toileting: Minimal assist, cleaning and garment management, using adaptive equipment, within precautions  OT: Meal Preparation: Minimal assist, using adaptive equipment, with simple meal preparation  OT: Home Management: Minimal assist, with light demand household tasks, within precautions, using adaptive equipment  OT: Cognitive: Patient/caregiver will verbalize understanding of cognitive assessment results/recommendations as needed for safe discharge planning                                   PT Predicted Duration/Target Date for Goal Attainment: 09/26/22  PT Frequency: Daily  PT: Bed Mobility: Independent, Supine to/from sit, Rolling, Bridging  PT: Transfers: Supervision/stand-by assist, Sit to/from stand, Bed to/from chair, Assistive device  PT: Gait: Quad cane, 25 feet, Minimal assist  PT: Stairs: 1 stair, Assistive device, Minimal assist  PT: Wheelchair Mobility: Caregiver SBA, 150 feet  PT: Goal 1: Pt will be able to complete car transfer with quad cane and Marilynn in order to access home  and medical appts.  PT: Goal 2: Pt will be able to complete floor>furniture transfer with mod A as part of fall recovery training.  PT: Goal 3: Pt will be IND with HEP for L LE/UE strengthening to help reduce risk of future falls.    SLP Predicted Duration/Target Date for Goal Attainment: 09/27/22  Therapy Frequency (SLP Eval): daily  SLP: Safely tolerate diet without signs/symptoms of aspiration: Regular diet, Thin liquids, Independently, With use of swallow precautions  SLP: Goal 1: Pt will demonstrate and initiate a plan of mod complexity with min cues 90% accuracy  SLP: Goal 2: Pt will recall novel and functional information of mod complexty with min cues 90% accuracu  SLP: Goal 3: Pt will implement word finding strategies within conversations with min cues 90% accuracy               RN: Goal: Wound Management: Pt or family will demonstrate wound care adequately before discharge.  RN: Patient/Family Goal: Bladder: Pt will be able to vocalize bladder needs and avoid purewick during the day while at ARU.  RN: Patient/Family Goal: Medication Management: Pt will successfully complete MAP if applicable before discharge.

## 2022-09-13 NOTE — PLAN OF CARE
Discharge Planner Post-Acute Rehab SLP:     Discharge Plan: Ongoing SLP, HH vs OP ?    Precautions: aspiration     Current Status:  Communication: Word finding difficulties in conversation   Cognition: Mild-mod cognitive impairments with deficits re: memory, attention, reasoning, language. Benefits from extra time   Swallow: Regular, moderately thick (3) liquid. Upright fully all PO. Medications given as tolerated with 3 liquid or puree.     Assessment:  Pt with ongoing difficulty recalling correct # reps completed IND of pharyngeal exercises. SLP created visual log to aid accountability and tracking # completed/day. Pt introduced to procedure and how to use functionally. Pt verbalized understanding and intent to utilize. Pt completed reps of pharyngeal exercises for CTAR + effortful swallow, Ariella, and introduced to Mendelsohn. Pt required mod cues to complete Mendelsohn accurately but improved as reps progressed. Pt demonstrated excellent and IND use of log to track exercises during session. Observed medication administration given whole with moderately thick (3) liquid. Tolerated w/o difficulty.    Other Barriers to Discharge (Family Training, etc): family training education re: thickened liquids (pending improvement) and IADL support

## 2022-09-13 NOTE — PLAN OF CARE
Postural Assessment for Stroke Scale (PASS)    Maintaining posture -   1) Sitting without support - 3  2) Standing with support (feet position free) - 2  3) Standing without support (feet position free) - 0  4) Standing on nonparetic leg - 0  5) Standing on paretic leg - 0    Changing posture -  6) Rolling supine -> affected side - 3  7) Rolling supine -> unaffected side - 3  8) Sup->sitting edge of bed - 3  9) Sitting edge of bed -> sup - 3  10) Sit->stand without support - 2  11) Stand->sit without support - 2  12) Standing,  pencil from floor without support - 0    Total Score - 21/36    The PASS assesses a patient's ability to change and maintain posture during different balance activities. It is not associated with falls risk, but is used to track progress with the patient's ability to control their posture and balance throughout the course of the patient's admission.

## 2022-09-13 NOTE — PROGRESS NOTES
We cannot do a virtual visit, or any outpatient visit, as long as patient is admitted to ARU. Insurance will not cover an outpatient appointment while admitted to an acute facility.    Eduard sent to oncall consult MD to please coordinate with appropriate provider for continued in-patient care with regards to her breast cancer diagnosis.  Hailey Torres RN

## 2022-09-14 ENCOUNTER — APPOINTMENT (OUTPATIENT)
Dept: OCCUPATIONAL THERAPY | Facility: CLINIC | Age: 67
DRG: 056 | End: 2022-09-14
Attending: PHYSICAL MEDICINE & REHABILITATION
Payer: COMMERCIAL

## 2022-09-14 ENCOUNTER — APPOINTMENT (OUTPATIENT)
Dept: SPEECH THERAPY | Facility: CLINIC | Age: 67
DRG: 056 | End: 2022-09-14
Attending: PHYSICAL MEDICINE & REHABILITATION
Payer: COMMERCIAL

## 2022-09-14 ENCOUNTER — APPOINTMENT (OUTPATIENT)
Dept: PHYSICAL THERAPY | Facility: CLINIC | Age: 67
DRG: 056 | End: 2022-09-14
Attending: PHYSICAL MEDICINE & REHABILITATION
Payer: COMMERCIAL

## 2022-09-14 LAB
HOLD SPECIMEN: NORMAL
PLATELET # BLD AUTO: 295 10E3/UL (ref 150–450)

## 2022-09-14 PROCEDURE — 92526 ORAL FUNCTION THERAPY: CPT | Mod: GN | Performed by: SPEECH-LANGUAGE PATHOLOGIST

## 2022-09-14 PROCEDURE — 97112 NEUROMUSCULAR REEDUCATION: CPT | Mod: GP

## 2022-09-14 PROCEDURE — 250N000011 HC RX IP 250 OP 636

## 2022-09-14 PROCEDURE — 250N000013 HC RX MED GY IP 250 OP 250 PS 637

## 2022-09-14 PROCEDURE — 85049 AUTOMATED PLATELET COUNT: CPT | Performed by: PHYSICAL MEDICINE & REHABILITATION

## 2022-09-14 PROCEDURE — 36415 COLL VENOUS BLD VENIPUNCTURE: CPT | Performed by: PHYSICAL MEDICINE & REHABILITATION

## 2022-09-14 PROCEDURE — 250N000013 HC RX MED GY IP 250 OP 250 PS 637: Performed by: STUDENT IN AN ORGANIZED HEALTH CARE EDUCATION/TRAINING PROGRAM

## 2022-09-14 PROCEDURE — 97535 SELF CARE MNGMENT TRAINING: CPT | Mod: GO

## 2022-09-14 PROCEDURE — 128N000003 HC R&B REHAB

## 2022-09-14 PROCEDURE — 97530 THERAPEUTIC ACTIVITIES: CPT | Mod: GP

## 2022-09-14 PROCEDURE — 250N000013 HC RX MED GY IP 250 OP 250 PS 637: Performed by: PHYSICAL MEDICINE & REHABILITATION

## 2022-09-14 PROCEDURE — 92507 TX SP LANG VOICE COMM INDIV: CPT | Mod: GN | Performed by: SPEECH-LANGUAGE PATHOLOGIST

## 2022-09-14 PROCEDURE — 99233 SBSQ HOSP IP/OBS HIGH 50: CPT | Mod: GC | Performed by: PHYSICAL MEDICINE & REHABILITATION

## 2022-09-14 PROCEDURE — 97110 THERAPEUTIC EXERCISES: CPT | Mod: GO

## 2022-09-14 RX ORDER — AMLODIPINE BESYLATE 10 MG/1
10 TABLET ORAL DAILY
Status: DISCONTINUED | OUTPATIENT
Start: 2022-09-15 | End: 2022-09-26

## 2022-09-14 RX ADMIN — CLOPIDOGREL BISULFATE 75 MG: 75 TABLET, FILM COATED ORAL at 08:07

## 2022-09-14 RX ADMIN — ATORVASTATIN CALCIUM 80 MG: 80 TABLET, FILM COATED ORAL at 20:17

## 2022-09-14 RX ADMIN — PANTOPRAZOLE SODIUM 40 MG: 40 TABLET, DELAYED RELEASE ORAL at 06:26

## 2022-09-14 RX ADMIN — AMLODIPINE BESYLATE 5 MG: 5 TABLET ORAL at 08:07

## 2022-09-14 RX ADMIN — Medication 25 MG: at 21:36

## 2022-09-14 RX ADMIN — LISINOPRIL 20 MG: 20 TABLET ORAL at 20:17

## 2022-09-14 RX ADMIN — HEPARIN SODIUM 5000 UNITS: 5000 INJECTION, SOLUTION INTRAVENOUS; SUBCUTANEOUS at 08:07

## 2022-09-14 RX ADMIN — Medication 5 MG: at 20:17

## 2022-09-14 RX ADMIN — HEPARIN SODIUM 5000 UNITS: 5000 INJECTION, SOLUTION INTRAVENOUS; SUBCUTANEOUS at 20:17

## 2022-09-14 RX ADMIN — ASPIRIN 81 MG: 81 TABLET, COATED ORAL at 08:07

## 2022-09-14 ASSESSMENT — ACTIVITIES OF DAILY LIVING (ADL)
ADLS_ACUITY_SCORE: 50
ADLS_ACUITY_SCORE: 55
ADLS_ACUITY_SCORE: 50
ADLS_ACUITY_SCORE: 51
ADLS_ACUITY_SCORE: 50
ADLS_ACUITY_SCORE: 51
ADLS_ACUITY_SCORE: 51
ADLS_ACUITY_SCORE: 50
ADLS_ACUITY_SCORE: 51
ADLS_ACUITY_SCORE: 50

## 2022-09-14 NOTE — PLAN OF CARE
"Discharge Planner Post-Acute Rehab SLP:     Discharge Plan: Ongoing SLP, HH vs OP ?    Precautions: aspiration     Current Status:  Communication: Word finding difficulties in conversation   Cognition: Mild-mod cognitive impairments with deficits re: memory, attention, reasoning, language. Benefits from extra time   Swallow: Regular, moderately thick (3) liquid. Upright fully all PO. Medications given as tolerated with 3 liquid or puree.     Assessment:  Pt sat up at edge of bed for session, moderately thick liquid at bedside to consume as needed for swallow exercise completion. Pt reported she was not sure she was completing Mendolsohn (\"Humberto's Apple\") correctly, pt re-educated in completion; SLP provided model of completion and instructed pt to feel accurate completion on SLP with palpation. Pt return demonstrated 10x reps Mendolsohn. Analyzed compeltion of Ariella, pt completed 20x total reps. Pt reported she did complete exercises earlier in day but could not record them as she could not find pencil, located and pt estimated reps completed. Pt isntructed to complete 20x efforftul swallows, 10x additional CTARs before p.m. session.    P.M.: Instructed pt in higher level word finding/connected speech task, pt with occasional pauses and frequent fillers (\"uhh\" \"umm\"), only 1x occurrence where word could not be retrieved. With cues from SLP for circumlocution strategy, pt continued to demo difficulty producing word; however, she rephrased statement and successfully communicated concept though she reported it wasn't exactly what she wanted to say.    Other Barriers to Discharge (Family Training, etc): family training education re: thickened liquids (pending improvement) and IADL support   "

## 2022-09-14 NOTE — PLAN OF CARE
Patient is awake, eyes open, able to verbalize commands. Baseline assessment complete. VS taken. Noted elevated blood pressure. No c/o pain. Pt reports restlessness from last night. PM medications given, including melatonin, and tolerated. Wound dressing to left breast and buttock clean, dry and intact. Bed lowered to pt's preference. Call light within in reach.  Will continue to further monitor pt through out the night.

## 2022-09-14 NOTE — PROGRESS NOTES
"  [facility]   Acute Rehabilitation Unit  Daily progress note    INTERVAL HISTORY  Stephani Garcia was seen and examined at bedside.  No acute events reported overnight.  Pt notes that she slept better last night with the addition of trazodone.  She does endorse feeling slightly depressed but is not interested in trying an antidepressant medication at this time.  Discussed with patient continued elevated BP and will increase amlodipine starting tomorrow AM.    Patient denies headache, fever, chills, shortness of breath, chest pain, abdominal pain, nausea, vomiting, and diarrhea.    Functionally, Patient continues to be a full participant with therapies.  With physical therapy pt worked on independence with transfers to right side, specifically on core activation in sitting and standing tolerance/weight shift for left lower extremity use.  With occupational therapy pt working with functional transfers requiring increased assist w/ transfers from w/c to bed.      MEDICATIONS    [START ON 9/15/2022] amLODIPine  10 mg Oral Daily     aspirin  81 mg Oral Daily     atorvastatin  80 mg Oral QPM     clopidogrel  75 mg Oral Daily     heparin ANTICOAGULANT  5,000 Units Subcutaneous Q12H     lisinopril  20 mg Oral At Bedtime     melatonin  5 mg Oral At Bedtime     pantoprazole  40 mg Oral QAM AC     traZODone  25 mg Oral At Bedtime        acetaminophen, hydrALAZINE, polyethylene glycol, senna-docusate     PHYSICAL EXAM  BP (!) 156/81 (BP Location: Right arm)   Pulse 86   Temp 97  F (36.1  C) (Oral)   Resp 16   Ht 1.702 m (5' 7\")   Wt 77.7 kg (171 lb 3.2 oz)   SpO2 96%   BMI 26.81 kg/m    General: No acute distress, resting comfortably in bed  HEENT: NC/NT, Moist mucous membranes  Pulmonary: Breathing comfortably on room air, clear to auscultation bilaterally  Cardiovascular: Regular rate and rhythm  Abdominal: Non-tender, non-distended, bowel sounds present in all four quadrants "   Extremities: warm, well perfused, no edema in bilateral lower extremities, no tenderness in calves  Neuro/MSK: Alert and oriented, moving right upper extremity and right lower extremity greater than antigravity, hearing intact to conversation, answering questions appropriately.    LABS  Recent Results (from the past 24 hour(s))   Platelet count    Collection Time: 09/14/22  5:58 AM   Result Value Ref Range    Platelet Count 295 150 - 450 10e3/uL   Extra Green Top (Lithium Heparin) Tube    Collection Time: 09/14/22  5:58 AM   Result Value Ref Range    Hold Specimen [facility]        Rehabilitation - continue comprehensive acute inpatient rehabilitation program with multidisciplinary approach including therapies, rehab nursing, and physiatry following. See interval history for updates.      ASSESSMENT AND PLAN  Stephani Garcia is a 67 year old left hand dominant female with PMHx HTN, HLD, vertigo, GILDA (not using CPAP). Admitted for acute ischemic infarcts with right frontal lobe and right basal ganglia involvement. Has functional deficits of weak L. shoulder shrug, L. Hemiparesis, dysphagia, dysarthria, possbile neurogenic bowel/bladder and cognitive deficits. She also has possible L. Breast CA pending pathology results. She is admitted to undergo therapies with PT/OT/SLP.     Admission to acute inpatient rehab: 9/5/22  Impairment group code: 01.1  Stroke Ischemic  (L) Body Involvement (R) Brain: small cluster of recent infarcts in the right frontal lobe and right basal ganglia.       # Acute ischemic stroke   # Recent infarcts in the right frontal lobe and right basal ganglia  Noted left-sided partial hemiparesis and mild dysarthria/expressive aphasia at presentation 8/30. MRI confirmed  Acute ischemic stroke, and TPA/ thrombectomy 2/2 unknown last known normal. CTA showed no large vessel occlusion, with only 30% of ICA occlusion. Echo was normal EF 65% on 8/31. No Afib noted with telemetry.  at baseline. BP goal  "is <140/90 at rest, anticipate increase with therapies.   - Continue Plavix 75 mg and aspirin 81 mg until 11/30   -aspirin 325mg starting  12/1  -  Atorvastatin 80 mg    - PT/OT/SLP     # Dysphagia    Noted during Video swallow study on 9/2/22: \"Patient had direct, silent aspiration with mildly thick liquid on first trial and deep penetration with mildly thick liquids on subsequent trials. No aspiration or penetration with moderately thick liquids.   - Continue dysphagia diet with moderately thick fluids  - SLP     # Left Breast Ductal Carcinoma   FNA 9/2. There is a high suspicion for breast CA. Surgery rec'd neoadjuvent treatment with hopes of shrinking tumor prior to surgery. Unable to surgically operate due to recent stroke  - Needs referral to oncology for outpatient follow-up.  --9/7 biopsy results: ductal carcinoma and estrogen and progesterone positive.     - [facility]  - WOC consult: appreciate recs 9/6      # Hyperlipidemia    at baseline.  - Atorvastatin 80 mg      # Hypertension  Was allowed permissive HTN, first 24 hours but started to bring it down slowly. Slowly decrease BP, with goal to keep <140/90 at rest.  - Continue lisinopril 20 mg daily  - Started Amlodipine 5 mg Daily 9/6. Increased to 10 mg 9/15  - Started Hydralazine 10 mg PRN 9/6       #GILDA (obstructive sleep apnea):  Patient does not use CPAP at baseline. May need repeat sleep study and eval by sleep medicine team.   - encourage using CPAP and call RT for trial     # Asymptomatic COVID 19 Infection    Positive Covid test on 8/3 with preceding cold symptoms for 3 days prior. Currently she continues remain asymptomatic from COVID symptoms on 9/5. She has no cough, wheezing, SOB, fever, chills, body aches or any type of cold symptoms.  She has completed remdesivir x 3 days.  Off of quarantine     # Neurogenic Bladder  - Timed voiding  - purewick HS     # Bowel   Noted mild loose stools 9/4  - PRN Senna " and Miralax     # Sleep adjustment  - Melatonin 5 mg at bedtime  - Trazodone 25 mg QHS     6. Adjustment to disability:  Clinical psychology to eval and treat. Consult sent 9/6 pending review.   7. FEN: Regular Diet Adult Liquidized/Moderately Thick (level 3)  8. Bowel: PRN Senna and Miralax  9. Bladder: Timed voiding  10. DVT Prophylaxis: Heparin 5000 units for DVT ppx. Plavix 75 mg daily and ASA 81 mg 90 days then ASA alone.   11. GI Prophylaxis: Pantoprazole 40 mg   12. Code: Full  13. Disposition: Home  14. ELOS:  9/26/2022  15. Rehab prognosis:  Fair  16. Follow up Appointments on Discharge:   - PCP follow up 7-14 days post discharge  - Follow-up with neurology in 6 to 8 weeks  - Referral for oncology given biopsy results   - GILDA: outpatient referral to sleep medicine clinic if agreeable       Patient seen and discussed with Dr. Braun, PM&R Staff Physician    Jam Borden DO  PGY4 PM&R Resident

## 2022-09-14 NOTE — PLAN OF CARE
Patient OOB and in wheelchair. Patient is alert and oriented, able to verbalize needs. No s/s of respiratory distress noted. Patient denies pain/discomfort. Dressing to left breast and sacrum clean, dry and intact. Head to toe assessment completed and documented. Vital signs stable. Safety and comfort measures maintained,call light within reach. Will continue with POC

## 2022-09-14 NOTE — PROGRESS NOTES
Discharge Planner Post-Acute Rehab OT:     Discharge Plan: home with spouse A, HC OT vs OP OT pending progress    Precautions: falls, dense L jong, monitor BP, LUE omoneurexa OOB for LUE mgmt     Current Status:  ADLs:    Mobility: Ax1 SaraStedy. Squat/stand pivot min-mod Ax1 to R side, needs skilled set up. Recommend Ax1 SaraStedy with nsg.    Grooming: SBA EOB wash face, Max A wash hands. Set up/clean up oral cares.    Dressing: UB- Mod A. LB - Max A shorts rolling and reacher. Feet - Don socks SBA RLE, A to hold LLE in fig four.    Bathing: Max A sponge bathing. Transfer N/A d/t isolation precautions.     Toileting: Transfer SaraStedy <> commode overlay. Total A cares and clothing mgmt. Recommend Ax2 nsg.  IADLs: IND PTA. Anticipate A at discharge.  Vision/Cognition: L strabismus baseline with pt reporting, pt reports worse vision in L eye at baseline. L inattention vs vision deficits noted in functional tasks. Functional deficits problem solving, sequencing, and wordfinding.    Assessment: Pt engaged in functional tsfers, increased assist w/ R transfers from w/c to bed. Pt edu on safety/tech and continue carry over for maximizing practice for safer technique. Pt engaged in LUE ex w/ distal muscles < activity verse proximal muscles. Cont. OT POC, squat pivot tsfers, LUE neuro--re-ed.    Other Barriers to Discharge (DME, Family Training, etc): All needs met on main level.     DME: TBD    Family training - Not scheduled as of 9/6/22.

## 2022-09-14 NOTE — PLAN OF CARE
Discharge Planner Post-Acute Rehab PT:      Discharge Plan: Home with  assist, 1 DAVE without rail. Home care vs OP PT, pending progress.     Precautions: Falls, L hemiplegia, dysphagia, breast CA     Current Status:  Bed Mobility: Variable SBA<>Min A supine<>sit for trunk and L LE management  Transfer: Ax1 SaraStedy with nsg. Min A with skilled set-up for squat pivot L/R  Gait: up to 20 ft, mod A for trunk and L LE mgmt at R rail + WC follow  Stairs: Unsafe  WC Propulsion: SBA- cues for safety with turning  Balance: Fair static/dynamic seated balance- LOB to the L primarily  PASS on 9/6: 13/36             on 9/13: 21/36     Assessment: Focus on IND with transfers to R side, core activation in sitting and standing tolerance/weight shift for L LE use.  Pt needing Marilynn for squat pivot transfers to R side (assist first for proper set up).  In standing still requiring L knee blocking due to poor quad activation.      Other Barriers to Discharge (DME, Family Training, etc):   Equipment: Anticipate K4 WC, quad cane, gait belt, L Eunice Neurexa, L AFO  Family training to be scheduled.

## 2022-09-15 ENCOUNTER — APPOINTMENT (OUTPATIENT)
Dept: SPEECH THERAPY | Facility: CLINIC | Age: 67
DRG: 056 | End: 2022-09-15
Attending: PHYSICAL MEDICINE & REHABILITATION
Payer: COMMERCIAL

## 2022-09-15 ENCOUNTER — APPOINTMENT (OUTPATIENT)
Dept: PHYSICAL THERAPY | Facility: CLINIC | Age: 67
DRG: 056 | End: 2022-09-15
Attending: PHYSICAL MEDICINE & REHABILITATION
Payer: COMMERCIAL

## 2022-09-15 ENCOUNTER — APPOINTMENT (OUTPATIENT)
Dept: OCCUPATIONAL THERAPY | Facility: CLINIC | Age: 67
DRG: 056 | End: 2022-09-15
Attending: PHYSICAL MEDICINE & REHABILITATION
Payer: COMMERCIAL

## 2022-09-15 PROCEDURE — 128N000003 HC R&B REHAB

## 2022-09-15 PROCEDURE — 250N000013 HC RX MED GY IP 250 OP 250 PS 637: Performed by: STUDENT IN AN ORGANIZED HEALTH CARE EDUCATION/TRAINING PROGRAM

## 2022-09-15 PROCEDURE — 250N000013 HC RX MED GY IP 250 OP 250 PS 637

## 2022-09-15 PROCEDURE — 250N000013 HC RX MED GY IP 250 OP 250 PS 637: Performed by: INTERNAL MEDICINE

## 2022-09-15 PROCEDURE — 250N000011 HC RX IP 250 OP 636

## 2022-09-15 PROCEDURE — 97130 THER IVNTJ EA ADDL 15 MIN: CPT | Mod: GN

## 2022-09-15 PROCEDURE — 250N000013 HC RX MED GY IP 250 OP 250 PS 637: Performed by: PHYSICAL MEDICINE & REHABILITATION

## 2022-09-15 PROCEDURE — 97112 NEUROMUSCULAR REEDUCATION: CPT | Mod: GP | Performed by: PHYSICAL THERAPIST

## 2022-09-15 PROCEDURE — 97129 THER IVNTJ 1ST 15 MIN: CPT | Mod: GN

## 2022-09-15 PROCEDURE — 97530 THERAPEUTIC ACTIVITIES: CPT | Mod: GP | Performed by: PHYSICAL THERAPIST

## 2022-09-15 PROCEDURE — 92526 ORAL FUNCTION THERAPY: CPT | Mod: GN

## 2022-09-15 PROCEDURE — 97535 SELF CARE MNGMENT TRAINING: CPT | Mod: GO

## 2022-09-15 PROCEDURE — 97112 NEUROMUSCULAR REEDUCATION: CPT | Mod: GO

## 2022-09-15 PROCEDURE — 99232 SBSQ HOSP IP/OBS MODERATE 35: CPT | Mod: CS | Performed by: PHYSICAL MEDICINE & REHABILITATION

## 2022-09-15 PROCEDURE — 99233 SBSQ HOSP IP/OBS HIGH 50: CPT | Performed by: INTERNAL MEDICINE

## 2022-09-15 RX ORDER — LETROZOLE 2.5 MG/1
2.5 TABLET, FILM COATED ORAL
Status: DISCONTINUED | OUTPATIENT
Start: 2022-09-15 | End: 2022-09-15

## 2022-09-15 RX ORDER — LETROZOLE 2.5 MG/1
2.5 TABLET, FILM COATED ORAL AT BEDTIME
Status: DISCONTINUED | OUTPATIENT
Start: 2022-09-15 | End: 2022-09-30 | Stop reason: HOSPADM

## 2022-09-15 RX ORDER — LETROZOLE 2.5 MG/1
2.5 TABLET, FILM COATED ORAL DAILY
Status: DISCONTINUED | OUTPATIENT
Start: 2022-09-16 | End: 2022-09-15

## 2022-09-15 RX ORDER — LETROZOLE 2.5 MG/1
2.5 TABLET, FILM COATED ORAL DAILY
Qty: 30 TABLET | Refills: 1 | Status: SHIPPED | OUTPATIENT
Start: 2022-09-15

## 2022-09-15 RX ADMIN — HEPARIN SODIUM 5000 UNITS: 5000 INJECTION, SOLUTION INTRAVENOUS; SUBCUTANEOUS at 08:17

## 2022-09-15 RX ADMIN — ATORVASTATIN CALCIUM 80 MG: 80 TABLET, FILM COATED ORAL at 21:20

## 2022-09-15 RX ADMIN — PANTOPRAZOLE SODIUM 40 MG: 40 TABLET, DELAYED RELEASE ORAL at 06:22

## 2022-09-15 RX ADMIN — AMLODIPINE BESYLATE 10 MG: 10 TABLET ORAL at 08:17

## 2022-09-15 RX ADMIN — HEPARIN SODIUM 5000 UNITS: 5000 INJECTION, SOLUTION INTRAVENOUS; SUBCUTANEOUS at 21:20

## 2022-09-15 RX ADMIN — LETROZOLE 2.5 MG: 2.5 TABLET, FILM COATED ORAL at 21:20

## 2022-09-15 RX ADMIN — ASPIRIN 81 MG: 81 TABLET, COATED ORAL at 08:17

## 2022-09-15 RX ADMIN — Medication 25 MG: at 21:20

## 2022-09-15 RX ADMIN — LISINOPRIL 20 MG: 20 TABLET ORAL at 21:20

## 2022-09-15 RX ADMIN — CLOPIDOGREL BISULFATE 75 MG: 75 TABLET, FILM COATED ORAL at 08:17

## 2022-09-15 RX ADMIN — Medication 5 MG: at 21:20

## 2022-09-15 ASSESSMENT — ACTIVITIES OF DAILY LIVING (ADL)
ADLS_ACUITY_SCORE: 55
ADLS_ACUITY_SCORE: 51
ADLS_ACUITY_SCORE: 55
ADLS_ACUITY_SCORE: 51
ADLS_ACUITY_SCORE: 55
ADLS_ACUITY_SCORE: 51
ADLS_ACUITY_SCORE: 55

## 2022-09-15 NOTE — PLAN OF CARE
FOCUS/GOAL  Medical management    ASSESSMENT, INTERVENTIONS AND CONTINUING PLAN FOR GOAL:  Vitals within patient's baseline. L breast wound dressing is cdi. No pain  reported.  Patient not on MAP yet. Was continent of bladder this shift, no BM this shift. Had unwitnessed fall w/o injury.  She tried to self transfer to bed. Patient to have a breast cancer inhibitor medication any time soon per Internal medicine.  Will continue with POC.  Goal Outcome Evaluation:    Plan of Care Reviewed With: patient, other (see comments)          Outcome Evaluation: Progressing towared goals.Denied pain this shift.

## 2022-09-15 NOTE — PROGRESS NOTES
Oncology - Inpatient Consult Service  Progress Note   Date of Service: 09/15/2022    Patient: Stephani Garcia  MRN: [id number]  Admission Date: 9/5/2022  Hospital Day # 10      Assessment & Recommendations:   Stephani Garcia is a 67-year-old female admitted to [facility] from 8/30-9/3/22 for an acute ischemic stroke involving right frontal lobe and right basal ganglia and subsequently discharged to ARU who was found to have an enlarging ulcerative left left breast mass which on cytology done 9/2/22 was consistent with ductal carcinoma ER strongly positive (> 95%; 3+), progesterone receptor: positive (75%; 3+) and HER2: equivocal (2+).     Patient remains admitted to ARU for ongoing rehabilitation, and seems to be slowly improving and regaining her neurological deficits. She is expected to remain in the ARU for another 3 or so weeks.     We reviewed her case with our breast oncologists. There is a broad agreement that patient will need additional workup to fully stage and characterize her disease. This would include a core biopsy from the breast and imaging of her body with either CT CAP or PET/CT to assess for distant metastatic disease. Unfortunately, this can not take place as long as the patient is in the ARU. We believe that the best thing to do in the interim for her breast cancer would be to start an oral aromatase inhibitor, such as letrozole in an effort to control her tumor while she is awaiting further workup and management. This would potentially allow her to continue rehabilitation for as long as that takes without risking further worsening of her ulcerative breast mass.     We had a detailed phone conversation today with the patient. We discussed the rationale of initiating an aromatase inhibitor as above. We reviewed the potential side effects of letrozole including myalgias, arthralgias, hot flashes, vaginal dryness, mood disorder, and thinning of the bones, but overall it is still  "tolerated well by most women. Furthermore, weighing the potential benefits against possible risks, our recommendation would be to trial letrozole and see how she does. At the end of our discussion, she was willing to proceed further. So, we ordered letrozole 2.5 mg daily HS to be started today, and communicated the plan with patient's PM&R team, floor pharmacist, and nurse. Ultimately, patient will need to be seen by one of our breast oncologists upon discharge for further workup and management.     We will continue to follow this patient while hospitalized. Please do not hesitate to page with any questions or concerns. Patient was seen and plan of care was discussed with attending physician Dr. Solis.    Nelson Matias  Hematology/Oncology Fellow  Pager: [phone]  =================================================================================      Interval History:  We talked to the patient over the phone due to video malfunction. She said that she is overall doing well and making slow improvement in rehabilitation. She denied any major medical concerns. No new neurological deficits.     Physical Exam:    Blood pressure 131/74, pulse 105, temperature (!) 96.5  F (35.8  C), temperature source Oral, resp. rate 16, height 1.702 m (5' 7\"), weight 77.7 kg (171 lb 3.2 oz), SpO2 94 %.  Detailed physical exam was deferred due to the virtual telephone nature of this encounter.     Labs:   CMP  Recent Labs   Lab 09/12/22  0630 09/11/22  0342     --    POTASSIUM 3.6  --    CHLORIDE 109  --    CO2 32  --    ANIONGAP 1*  --    * 119*   BUN 15  --    CR 0.75  --    GFRESTIMATED 86  --    ERNIE 9.6  --      CBC  Recent Labs   Lab 09/14/22  0558 09/12/22  0630 09/11/22  0518   WBC  --  9.6  --    RBC  --  4.74  --    HGB  --  13.6  --    HCT  --  40.6  --    MCV  --  86  --    MCH  --  28.7  --    MCHC  --  33.5  --    RDW  --  12.8  --     [id number]     INRNo lab results found in last 7 days.    Inpatient medication " list:  Current Facility-Administered Medications   Medication     acetaminophen (TYLENOL) tablet 650 mg     amLODIPine (NORVASC) tablet 10 mg     aspirin EC tablet 81 mg     atorvastatin (LIPITOR) tablet 80 mg     clopidogrel (PLAVIX) tablet 75 mg     heparin ANTICOAGULANT injection 5,000 Units     hydrALAZINE (APRESOLINE) tablet 10 mg     letrozole (FEMARA) tablet 2.5 mg     lisinopril (ZESTRIL) tablet 20 mg     melatonin tablet 5 mg     pantoprazole (PROTONIX) EC tablet 40 mg     Patient is already receiving anticoagulation with heparin, enoxaparin (LOVENOX), warfarin (COUMADIN)  or other anticoagulant medication     polyethylene glycol (MIRALAX) Packet 17 g     senna-docusate (SENOKOT-S/PERICOLACE) 8.6-50 MG per tablet 1-4 tablet     traZODone (DESYREL) half-tab 25 mg

## 2022-09-15 NOTE — PLAN OF CARE
Pt awake, eyes open. Alert and oriented x 4. Baseline assessment complete. VS wnl. No c/o of pain or distress. Dressing to L breast clean and intact. PM medications given and tolerated. Assisted pt to toilet with sera steady. X 1 void noted. Mepilex dressing to buttock changed and dated. Safety/fall precautions implemented. Will continue care.

## 2022-09-15 NOTE — PROGRESS NOTES
Pt fell off the wheel chair at about 1300. She called for assistance by yelling and the call light was also on when writer arrived to the room. She was assisted back to bed with assist of two staff using a transfer belt. There was no injury related to this incident.Provider physician and family were notified of the incident. There was no chair alarm when she fell, primary nurse was informed to find one and put on the chair to alert the staff if anything happens in the future.

## 2022-09-15 NOTE — PLAN OF CARE
Discharge Planner Post-Acute Rehab SLP:     Discharge Plan: Ongoing SLP, HH vs OP ?    Precautions: aspiration     Current Status:  Communication: Word finding difficulties in conversation   Cognition: Mild-mod cognitive impairments with deficits re: memory, attention, reasoning, language. Benefits from extra time   Swallow: Regular, moderately thick (3) liquid. Upright fully all PO. Medications given as tolerated with 3 liquid or puree.     Assessment:  Pt using pharyngeal exercise log IND. Participated in reps of pharyngeal exercises for effortful swallow + CTAR, Ariella, and Mendelsohn. Pt recording accurately completed reps in log. Required few instances cues to continue to complete CTAR correctly without hand assist. Ongoing difficulty with execution of Mendelsohn but improved with further modeling and min cues. Observed intake wiht moderately thick (3) liquids with exercises. Pt with single instance cough. Do suspect some difficulty with sequencing swallow timing during instances of trying to respond immediately after sip or poor positioning. Instances of coughing appear isolated to these instances vs chronic.    PM: Pt participated in task arranging information within garden plot. Reviewed strategies fororganization and accuracy. Pt with IND strategies to aid written completion and IND. Pt wiht intent to designate first letter of item per space of target. However, following delay, difficulty distinguishing what items were. Pt able to solve more mod complex tasks with use of strategies and verbalizing outloud steps to sequence solution to problem    Other Barriers to Discharge (Family Training, etc): family training education re: thickened liquids (pending improvement) and IADL support

## 2022-09-15 NOTE — PROGRESS NOTES
"  [facility]   Acute Rehabilitation Unit    INTERVAL HISTORY  Stephani reports feeling well today, and feels like she is making slow progress. She is looking forward to lunch.     Nursing notes reviewed, no acute events overnight.   Continent of bladder, Continent of bowel, Last BM 9/14. Denies chest pain abdominal pain, shortness of breath. No further concerns.    Functionally, with PT she is:  Assist x1 with Lea Steady in transfers  Ambulates up to 20 ft, mod assist with trunk  PASS on 9/13 is 21/36    With OT, she is:  Mod assist with upper body dressing, max assist for lower body changing  SBA for washing face, max assist with washing hands    With SLP, she is:  Can swallow moderately thick liquids  Word finding difficulties in conversation  Mild to moderate cognitive impairments with memory, attention, language, benefits from time        MEDICATIONS  Scheduled meds    amLODIPine  10 mg Oral Daily     aspirin  81 mg Oral Daily     atorvastatin  80 mg Oral QPM     clopidogrel  75 mg Oral Daily     heparin ANTICOAGULANT  5,000 Units Subcutaneous Q12H     lisinopril  20 mg Oral At Bedtime     melatonin  5 mg Oral At Bedtime     pantoprazole  40 mg Oral QAM AC     traZODone  25 mg Oral At Bedtime       PRN meds:  acetaminophen, hydrALAZINE, polyethylene glycol, senna-docusate      PHYSICAL EXAM  BP (!) 149/76 (BP Location: Right arm, Patient Position: Semi-West's, Cuff Size: Adult Regular)   Pulse 90   Temp 97  F (36.1  C) (Oral)   Resp 16   Ht 1.702 m (5' 7\")   Wt 77.7 kg (171 lb 3.2 oz)   SpO2 96%   BMI 26.81 kg/m    Gen: awake, alert  HEENT: moist mucus membranes, EOMI  Cardio: RRR, no murmur  Pulm: on room air, lungs CTA bilaterally  Abd: soft, nontender, nondistended  Ext: moves RUE and RLE freely  Neuro/MSK:   Strength: 2/5 at L EF and EE, 0/5 at L plantarflexion, dorsiflexion.  5/5 at R EF and EE, 5/5 at R plantarflexion and dorsiflexion    LABS  No results found " "for this or any previous visit (from the past 24 hour(s)).    ASSESSMENT AND PLAN    Stephani Garcia is a 67 year old left hand dominant female with PMHx HTN, HLD, vertigo, GILDA (not using CPAP). Admitted for acute ischemic infarcts with right frontal lobe and right basal ganglia involvement. Has functional deficits of weak L. shoulder shrug, L. Hemiparesis, dysphagia, dysarthria, possbile neurogenic bowel/bladder and cognitive deficits. She also has possible L. Breast CA pending pathology results. She is admitted to undergo therapies with PT/OT/SLP.     Admission to acute inpatient rehab: 9/5/22  Impairment group code: 01.1  Stroke Ischemic  (L) Body Involvement (R) Brain: small cluster of recent infarcts in the right frontal lobe and right basal ganglia.       # Acute ischemic stroke   # Recent infarcts in the right frontal lobe and right basal ganglia  Noted left-sided partial hemiparesis and mild dysarthria/expressive aphasia at presentation 8/30. MRI confirmed  Acute ischemic stroke, and TPA/ thrombectomy 2/2 unknown last known normal. CTA showed no large vessel occlusion, with only 30% of ICA occlusion. Echo was normal EF 65% on 8/31. No Afib noted with telemetry.  at baseline. BP goal is <140/90 at rest, anticipate increase with therapies.   - Continue Plavix 75 mg and aspirin 81 mg until 11/30   -aspirin 325mg starting  12/1  -  Atorvastatin 80 mg    - PT/OT/SLP     # Dysphagia    Noted during Video swallow study on 9/2/22: \"Patient had direct, silent aspiration with mildly thick liquid on first trial and deep penetration with mildly thick liquids on subsequent trials. No aspiration or penetration with moderately thick liquids.   - Continue dysphagia diet with moderately thick fluids  - SLP     # Left Breast Ductal Carcinoma   FNA 9/2. There is a high suspicion for breast CA. Surgery rec'd neoadjuvent treatment with hopes of shrinking tumor prior to surgery. Unable to surgically operate due " to recent stroke  - Needs referral to oncology for outpatient follow-up.  --9/7 biopsy results: ductal carcinoma and estrogen and progesterone positive.     - [facility]  - WOC consult: appreciate recs 9/6   -plan to start letrozole per oncology, will discuss regarding logistics of obtaining medication     # Hyperlipidemia    at baseline.  - Atorvastatin 80 mg      # Hypertension  Was allowed permissive HTN, first 24 hours but started to bring it down slowly. Slowly decrease BP, with goal to keep <140/90 at rest.  - Continue lisinopril 20 mg daily  - Started Amlodipine 5 mg Daily 9/6. Increased to 10 mg 9/15  - Started Hydralazine 10 mg PRN 9/6       #GILDA (obstructive sleep apnea):  Patient does not use CPAP at baseline. May need repeat sleep study and eval by sleep medicine team.   - encourage using CPAP and call RT for trial     # Asymptomatic COVID 19 Infection    Positive Covid test on 8/3 with preceding cold symptoms for 3 days prior. Currently she continues remain asymptomatic from COVID symptoms on 9/5. She has no cough, wheezing, SOB, fever, chills, body aches or any type of cold symptoms.  She has completed remdesivir x 3 days.  Off of quarantine     # Neurogenic Bladder  - Timed voiding  - purewick HS     # Bowel   Noted mild loose stools 9/4  - PRN Senna and Miralax     # Sleep adjustment  - Melatonin 5 mg at bedtime  - Trazodone 25 mg QHS     6. Adjustment to disability:  Clinical psychology to eval and treat. Consult sent 9/6 pending review.   7. FEN: Regular Diet Adult Liquidized/Moderately Thick (level 3)  8. Bowel: PRN Senna and Miralax  9. Bladder: Timed voiding  10. DVT Prophylaxis: Heparin 5000 units for DVT ppx. Plavix 75 mg daily and ASA 81 mg 90 days then ASA alone.   11. GI Prophylaxis: Pantoprazole 40 mg   12. Code: Full  13. Disposition: Home  14. ELOS:  9/26/2022  15. Rehab prognosis:  Fair  16. Follow up Appointments on Discharge:   - PCP follow up 7-14  days post discharge  - Follow-up with neurology in 6 to 8 weeks  - Referral for oncology given biopsy results   - GILDA: outpatient referral to sleep medicine clinic if agreeable         Seen and discussed with Dr. Marcos, PM&R staff physician     Corinne Macias DO  PGY-2  Physical Medicine and Rehabilitation

## 2022-09-15 NOTE — PLAN OF CARE
Discharge Planner Post-Acute Rehab PT:      Discharge Plan: Home with  assist, 1 DAVE without rail. Home care vs OP PT, pending progress.     Precautions: Falls, L hemiplegia, dysphagia, breast CA     Current Status:  Bed Mobility: Variable SBA<>Min A supine<>sit for trunk and L LE management  Transfer: Ax1 SaraStedy with nsg. Min A with skilled set-up for squat pivot L/R  Gait: up to 20 ft, mod A for trunk and L LE mgmt at R rail + WC follow  Stairs: Unsafe  WC Propulsion: SBA- cues for safety with turning  Balance: Fair static/dynamic seated balance- LOB to the L primarily  PASS on 9/6: 13/36             on 9/13: 21/36     Assessment: Continues to require consistent and step-by-step cueing for safe WC set-up prior to transfers; however, once set-up, able to demonstrate CGA to the R, and min A with skilled set-up to the L today. Plan to continue with L UE/LE WB activities along with FES to promote further muscle activation.     Other Barriers to Discharge (DME, Family Training, etc):   Equipment: Anticipate K4 WC, quad cane, gait belt, L Eunice Neurexa, L AFO  Family training to be scheduled.

## 2022-09-15 NOTE — PROGRESS NOTES
Discharge Planner Post-Acute Rehab OT:     Discharge Plan: home with spouse A, HC OT vs OP OT pending progress    Precautions: falls, dense L jong, monitor BP, LUE omoneurexa OOB for LUE mgmt     Current Status:  ADLs:    Mobility: Ax1 SaraStedy. Squat/stand pivot min-mod Ax1 to R side, needs skilled set up. Recommend Ax1 SaraStedy with nsg.    Grooming: SBA EOB wash face, Max A wash hands. Set up/clean up oral cares.    Dressing: UB- Mod A. LB - Max A shorts rolling and reacher. Feet - Don socks SBA RLE, A to hold LLE in fig four.    Bathing: Max A sponge bathing. Transfer N/A d/t isolation precautions.     Toileting: Transfer SaraStedy <> commode overlay. Total A cares and clothing mgmt. Recommend Ax2 nsg.  IADLs: IND PTA. Anticipate A at discharge.  Vision/Cognition: L strabismus baseline with pt reporting, pt reports worse vision in L eye at baseline. L inattention vs vision deficits noted in functional tasks. Functional deficits problem solving, sequencing, and wordfinding.    Assessment: Facilitated LUE FES for carry over for ADLs. Pt fatigue mid session and required brief break.    OT: Pt tolerating 30 min BUE ergometry with LUE muscle grading w/ repeated movement w/ support of functional estim for muscle engagement. Indicated to improve BUE function for ADL/IADL IND. Time including skilled set up and real time adjustment of parameters. Assist for set up, cues for postural alignment, pace, and attention. See careplan note for further information.    Skilled set up on :  Pt dependent for proper placement of electrodes on LUE for optimum muscle contraction, safe positioning on w/c within frame and cushion for prevention of skin breakdown, UE secured to arm support.  Passive motion assessed to ensure proper positioning.      Pt performed  minutes of active FES ergometry with 0-100% stimulation applied to above muscles at 25 rpm with 0.50nm resistance.  This OT adjusted e-stim and cycling parameters in  real-time to ensure palpable muscle contractions throughout session.  Please see [email or web address] for further details on patient's stimulation parameters and ergometry outcomes.        Functional outcomes from this intervention include:   -reduced spasticity  -improved sensory awareness and proprioception  -improved muscle strength  -improved motor coordination            Other Barriers to Discharge (DME, Family Training, etc): All needs met on main level.     DME: TBD    Family training - Not scheduled as of 9/6/22.

## 2022-09-16 ENCOUNTER — APPOINTMENT (OUTPATIENT)
Dept: PHYSICAL THERAPY | Facility: CLINIC | Age: 67
DRG: 056 | End: 2022-09-16
Attending: PHYSICAL MEDICINE & REHABILITATION
Payer: COMMERCIAL

## 2022-09-16 ENCOUNTER — APPOINTMENT (OUTPATIENT)
Dept: SPEECH THERAPY | Facility: CLINIC | Age: 67
DRG: 056 | End: 2022-09-16
Attending: PHYSICAL MEDICINE & REHABILITATION
Payer: COMMERCIAL

## 2022-09-16 ENCOUNTER — APPOINTMENT (OUTPATIENT)
Dept: OCCUPATIONAL THERAPY | Facility: CLINIC | Age: 67
DRG: 056 | End: 2022-09-16
Attending: PHYSICAL MEDICINE & REHABILITATION
Payer: COMMERCIAL

## 2022-09-16 PROCEDURE — 250N000013 HC RX MED GY IP 250 OP 250 PS 637

## 2022-09-16 PROCEDURE — 97530 THERAPEUTIC ACTIVITIES: CPT | Mod: GP | Performed by: PHYSICAL THERAPIST

## 2022-09-16 PROCEDURE — 250N000013 HC RX MED GY IP 250 OP 250 PS 637: Performed by: STUDENT IN AN ORGANIZED HEALTH CARE EDUCATION/TRAINING PROGRAM

## 2022-09-16 PROCEDURE — 128N000003 HC R&B REHAB

## 2022-09-16 PROCEDURE — 97535 SELF CARE MNGMENT TRAINING: CPT | Mod: GO

## 2022-09-16 PROCEDURE — 250N000013 HC RX MED GY IP 250 OP 250 PS 637: Performed by: INTERNAL MEDICINE

## 2022-09-16 PROCEDURE — G0463 HOSPITAL OUTPT CLINIC VISIT: HCPCS

## 2022-09-16 PROCEDURE — 92526 ORAL FUNCTION THERAPY: CPT | Mod: GN

## 2022-09-16 PROCEDURE — 250N000013 HC RX MED GY IP 250 OP 250 PS 637: Performed by: PHYSICAL MEDICINE & REHABILITATION

## 2022-09-16 PROCEDURE — 250N000011 HC RX IP 250 OP 636

## 2022-09-16 PROCEDURE — 97112 NEUROMUSCULAR REEDUCATION: CPT | Mod: GO

## 2022-09-16 PROCEDURE — 97112 NEUROMUSCULAR REEDUCATION: CPT | Mod: GP | Performed by: PHYSICAL THERAPIST

## 2022-09-16 PROCEDURE — 99233 SBSQ HOSP IP/OBS HIGH 50: CPT | Mod: CS | Performed by: PHYSICAL MEDICINE & REHABILITATION

## 2022-09-16 RX ADMIN — ATORVASTATIN CALCIUM 80 MG: 80 TABLET, FILM COATED ORAL at 20:39

## 2022-09-16 RX ADMIN — HEPARIN SODIUM 5000 UNITS: 5000 INJECTION, SOLUTION INTRAVENOUS; SUBCUTANEOUS at 08:02

## 2022-09-16 RX ADMIN — HEPARIN SODIUM 5000 UNITS: 5000 INJECTION, SOLUTION INTRAVENOUS; SUBCUTANEOUS at 20:39

## 2022-09-16 RX ADMIN — LISINOPRIL 30 MG: 20 TABLET ORAL at 20:39

## 2022-09-16 RX ADMIN — LETROZOLE 2.5 MG: 2.5 TABLET, FILM COATED ORAL at 20:40

## 2022-09-16 RX ADMIN — PANTOPRAZOLE SODIUM 40 MG: 40 TABLET, DELAYED RELEASE ORAL at 08:03

## 2022-09-16 RX ADMIN — CLOPIDOGREL BISULFATE 75 MG: 75 TABLET, FILM COATED ORAL at 08:03

## 2022-09-16 RX ADMIN — ASPIRIN 81 MG: 81 TABLET, COATED ORAL at 08:03

## 2022-09-16 RX ADMIN — Medication 25 MG: at 20:40

## 2022-09-16 RX ADMIN — Medication 5 MG: at 20:39

## 2022-09-16 RX ADMIN — AMLODIPINE BESYLATE 10 MG: 10 TABLET ORAL at 08:02

## 2022-09-16 ASSESSMENT — ACTIVITIES OF DAILY LIVING (ADL)
ADLS_ACUITY_SCORE: 47
ADLS_ACUITY_SCORE: 51
ADLS_ACUITY_SCORE: 47
ADLS_ACUITY_SCORE: 47
ADLS_ACUITY_SCORE: 51
ADLS_ACUITY_SCORE: 51
ADLS_ACUITY_SCORE: 47
ADLS_ACUITY_SCORE: 51

## 2022-09-16 NOTE — PROGRESS NOTES
Discharge Planner Post-Acute Rehab OT:     Discharge Plan: home with spouse A, HC OT vs OP OT pending progress    Precautions: falls, dense L jong, monitor BP, LUE omoneurexa OOB for LUE mgmt, breast cancer    Current Status:  ADLs:    Mobility: Ax1 SaraStedy. Squat/stand pivot min Ax1 to R side, needs skilled set up. Recommend Ax1 SaraStedy with nsg.    Grooming: SBA EOB wash face, Mod A wash hands. Set up/clean up oral cares.    Dressing: UB- Mod A. LB - Max A shorts rolling and reacher. Feet - Don socks SBA RLE, A to hold LLE in fig four.    Bathing: Max A sponge bathing. Transfer N/A d/t isolation precautions.     Toileting: Transfer SaraStedy <> commode overlay. Total A cares and clothing mgmt.   IADLs: IND PTA. Anticipate A at discharge.  Vision/Cognition: L strabismus baseline with pt reporting, pt reports worse vision in L eye at baseline. L inattention vs vision deficits noted in functional tasks. Functional deficits problem solving, sequencing, and wordfinding.    Assessment: Trialed squat pivot for toileting, not appropriate outside of skilled therapies at this time d/t L weakness and specific cuing required. Tolerated standing LUE wb activities well. Continued mild tone in left hand, plan for resting hand splint fabrication.    Other Barriers to Discharge (DME, Family Training, etc): All needs met on main level.     DME: TBD    Family training - Not scheduled as of 9/6/22.

## 2022-09-16 NOTE — PROGRESS NOTES
"  [facility]   Acute Rehabilitation Unit    INTERVAL HISTORY  Stephani had a fall yesterday afternoon, where she slid out of her wheelchair and had to be assisted back into her wheelchair. She obtained no injuries from the incident. This morning, she reports no soreness or pain. She was able to have the first dose of letrozole, and has not noticed any side effects so far.     Nursing notes reviewed, no acute events overnight.   Continent of bladder, continent of bowel, Last BM 9/14. Denies chest pain abdominal pain, shortness of breath. No further concerns.    Functionally, with PT she is:  -Requiring consistent step by step cueing for safe wheelchair transfers but once set up she is CGA on R side and min assist on L side  -Mod assist for ambulating up to 20 ft    With SLP she is:   -working on pharyngeal exercises  -able to complete task arranging activity using strategies        MEDICATIONS  Scheduled meds    amLODIPine  10 mg Oral Daily     aspirin  81 mg Oral Daily     atorvastatin  80 mg Oral QPM     clopidogrel  75 mg Oral Daily     heparin ANTICOAGULANT  5,000 Units Subcutaneous Q12H     letrozole  2.5 mg Oral At Bedtime     lisinopril  20 mg Oral At Bedtime     melatonin  5 mg Oral At Bedtime     pantoprazole  40 mg Oral QAM AC     traZODone  25 mg Oral At Bedtime       PRN meds:  acetaminophen, hydrALAZINE, polyethylene glycol, senna-docusate      PHYSICAL EXAM  BP (!) 161/73 (BP Location: Right arm)   Pulse 89   Temp (!) 96.4  F (35.8  C) (Oral)   Resp 14   Ht 1.702 m (5' 7\")   Wt 77.7 kg (171 lb 3.2 oz)   SpO2 95%   BMI 26.81 kg/m    Gen: awake, alert, pleasant  HEENT: moist mucus membranes  Cardio: RRR, no murmur  Pulm: on room air, lungs CTA bilaterally  Abd: soft, nontender, nondistended  Ext: moves RUE and RLE freely   Neuro/MSK: sensation intact to soft touch at BUE  Strength 2/5 at L EF and EE, 5/5 at R EF and EE    LABS  No results found for this or any " "previous visit (from the past 24 hour(s)).    ASSESSMENT AND PLAN    Stephani Garcia is a 67 year old left hand dominant female with PMHx HTN, HLD, vertigo, GILDA (not using CPAP). Admitted for acute ischemic infarcts with right frontal lobe and right basal ganglia involvement. Has functional deficits of weak L. shoulder shrug, L. Hemiparesis, dysphagia, dysarthria, possbile neurogenic bowel/bladder and cognitive deficits. She also has possible L. Breast CA pending pathology results. She is admitted to undergo therapies with PT/OT/SLP.     Admission to acute inpatient rehab: 9/5/22  Impairment group code: 01.1  Stroke Ischemic  (L) Body Involvement (R) Brain: small cluster of recent infarcts in the right frontal lobe and right basal ganglia.       # Acute ischemic stroke   # Recent infarcts in the right frontal lobe and right basal ganglia  Noted left-sided partial hemiparesis and mild dysarthria/expressive aphasia at presentation 8/30. MRI confirmed  Acute ischemic stroke, and TPA/ thrombectomy 2/2 unknown last known normal. CTA showed no large vessel occlusion, with only 30% of ICA occlusion. Echo was normal EF 65% on 8/31. No Afib noted with telemetry.  at baseline. BP goal is <140/90 at rest, anticipate increase with therapies.   - Plavix 75 mg and aspirin 81 mg until 11/30   -aspirin 325mg starting 12/1  -  Atorvastatin 80 mg    - PT/OT/SLP     # Dysphagia    Video swallow study on 9/2/22: \"Patient had direct, silent aspiration with mildly thick liquid on first trial and deep penetration with mildly thick liquids on subsequent trials. No aspiration or penetration with moderately thick liquids.   - Continue dysphagia diet with moderately thick fluids  - SLP  - repeat swallow study ordered 9/16     # Left Breast Ductal Carcinoma   FNA 9/2. There is a high suspicion for breast CA. Surgery rec'd neoadjuvent treatment with hopes of shrinking tumor prior to surgery. Unable to surgically operate due " to recent stroke  - Needs referral to oncology for outpatient follow-up.  --9/7 biopsy results: ductal carcinoma and estrogen and progesterone positive.     - [facility]  - WOC consult: appreciate recs 9/6   -Letrozole 2.5 mg      # Hyperlipidemia    at baseline.  - Atorvastatin 80 mg      # Hypertension  Was allowed permissive HTN, first 24 hours but started to bring it down slowly. Slowly decrease BP, with goal to keep <140/90 at rest.  - Lisinopril 30 mg daily  - Amlodipine 10 mg Daily 9/15  - Hydralazine 10 mg PRN 9/6 for SBP over 170     # Neurogenic Bladder  - Timed voiding  - purewick HS     # Bowel   Noted mild loose stools 9/4  - PRN Senna and Miralax     # Sleep   - Melatonin 5 mg at bedtime  - Trazodone 25 mg at bedtime    # Asymptomatic COVID 19 Infection - Resolved  Positive Covid test on 8/3 with preceding cold symptoms for 3 days prior. Currently she continues remain asymptomatic from COVID symptoms on 9/5. She has no cough, wheezing, SOB, fever, chills, body aches or any type of cold symptoms.  She has completed remdesivir x 3 days.  Off of quarantine    #GILDA (obstructive sleep apnea):  Patient does not use CPAP at baseline. May need repeat sleep study and eval by sleep medicine team.   - encourage using CPAP        6. Adjustment to disability:  Clinical psychology to eval and treat, seen 9/13  7. FEN: Regular Diet Adult Liquidized/Moderately Thick (level 3)  8. Bowel: PRN Senna and Miralax  9. Bladder: Timed voiding  10. DVT Prophylaxis: Heparin 5000 units for DVT ppx. Plavix 75 mg daily and ASA 81 mg 90 days then ASA alone.   11. GI Prophylaxis: Pantoprazole 40 mg   12. Code: Full  13. Disposition: Home  14. ELOS:  9/26/2022  15. Rehab prognosis:  Fair  16. Follow up Appointments on Discharge:   - PCP follow up 7-14 days post discharge  - Follow-up with neurology in 6 to 8 weeks  - Referral for oncology given biopsy results   - GILDA: outpatient referral to sleep  medicine clinic if agreeable        Seen and discussed with Dr. Marcos , PM&R staff physician     Corinne Macias, DO  PGY-2  Physical Medicine and Rehabilitation

## 2022-09-16 NOTE — PLAN OF CARE
FOCUS/GOAL  Medical management    ASSESSMENT, INTERVENTIONS AND CONTINUING PLAN FOR GOAL:  Patient is stable. BP slightly high at the beginning of shift, normalized after morning med. Had a good shift w/o issue. Was continent of bladder this shift. No BM to report. Denied pain. Will continue with POC.  Goal Outcome Evaluation:    Plan of Care Reviewed With: patient     Overall Patient Progress: improving    Outcome Evaluation: Progressing well. No pain this shift. Patient is continent of both bladder and bowel. Wound dressing are cdi. Should be started on MAP soon.

## 2022-09-16 NOTE — PLAN OF CARE
Goal Outcome Evaluation:    Plan of Care Reviewed With: patient     Overall Patient Progress: improving    Outcome Evaluation: Pt not yet on MAP. Wound care completed, and general education given during dressing change. Remains continent of bladder and is going up to toilet. No pain reported at this time.

## 2022-09-16 NOTE — PLAN OF CARE
Discharge Planner Post-Acute Rehab SLP:     Discharge Plan:  SLP    Precautions: aspiration, fall    Current Status:  Communication: Word finding difficulties in conversation   Cognition: Mild-mod cognitive impairments with deficits re: memory, attention, reasoning, language. Benefits from extra time   Swallow: Regular, moderately thick (3) liquid. Upright fully all PO. Medications given as tolerated with 3 liquid or puree.     Assessment:  Pt reported recent completion of oral cares prior to SLP arrival. Pt accepted PO trials of ice chips, mildly thick (2) liquid by tsp and cup following free water protocol (FWP) guidelines. Pt with few instances delayed cough following initial trials of level 2 liquid by tsp and single instance wet vocal quality following initial ice chip. No other overt s/sx aspiration noted. Cannot r/o silent aspiration at the bedside. Pt would be appropriate for repeat VFSS 9/19.    Other Barriers to Discharge (Family Training, etc): family training education re: thickened liquids (pending improvement) and IADL support

## 2022-09-16 NOTE — PROGRESS NOTES
Referral sent to [facility] in anticipation of HC needs at discharge. Anticipate insurance may be a barrier to finding HC. Will continue to follow and remain available.     JANET Lima   [facility]   Direct Phone: [phone]  I   Pager: [phone]  I  Fax: [phone]

## 2022-09-16 NOTE — PLAN OF CARE
Discharge Planner Post-Acute Rehab PT:      Discharge Plan: Home with  assist, 1 DAVE without rail. Home care vs OP PT, pending progress.     Precautions: Falls, L hemiplegia, dysphagia, breast CA (cleared for NMES)     Current Status:  Bed Mobility: SBA rolling and supine<>sit  Transfer: Ax1 SaraStedy with nsg. Min A with skilled set-up for squat pivot L/R  Gait: up to 20 ft, mod A for trunk and L LE mgmt at R rail + WC follow  Stairs: Unsafe  WC Propulsion: SBA- cues for safety with turning  Balance: Fair static/dynamic seated balance- LOB to the L primarily  PASS on 9/6: 13/36             on 9/13: 21/36     Assessment: Continues to require consistent and step-by-step cueing for safe WC set-up prior to transfers; however, once set-up, able to demonstrate CGA<>min A to the R, and min<>mod A with skilled set-up. Plan to continue with L UE/LE WB activities along with FES to promote further muscle activation.     Other Barriers to Discharge (DME, Family Training, etc):   Equipment: Anticipate K4 WC, quad cane, gait belt, L Eunice Neurexa, L AFO  Family training to be scheduled.

## 2022-09-16 NOTE — PLAN OF CARE
FOCUS/GOAL  Medical management    ASSESSMENT, INTERVENTIONS AND CONTINUING PLAN FOR GOAL:  Pt is alert and oriented. No complaints of pain. Assist of 1 ubaldo magdaleno. Continent of bladder using toilet in the bathroom. Appeared to be sleeping on rounds.

## 2022-09-16 NOTE — PLAN OF CARE
Skilled set up on :  Pt dependent for proper placement of electrodes on left quads, hamstrings, gastroc, and anterior tibialis for optimum muscle contraction, safe positioning on w/c within frame and cushion for prevention of skin breakdown, feet secured to foot pedals, w/c secured to  w/ Q-straints.  Passive motion assessed to ensure proper positioning.      Pt performed 35 minutes of active FES ergometry with 100% stimulation applied to above muscles at ~40rpm with 0.50<>1.04nm resistance.  This PT adjusted e-stim and cycling parameters in real-time to ensure palpable muscle contractions throughout session.  Please see [email or web address] for further details on patient's stimulation parameters and ergometry outcomes.      Changes in parameters that were part of this treatment:   -None    Functional outcomes from this intervention include:   -reduced spasticity  -improved sensory awareness and proprioception  -improved muscle strength  -improved motor coordination    Session Summary:   -Average Power: 3.6  W  -Asymmetry: Right 6%  -Active Minutes: 32:37

## 2022-09-17 ENCOUNTER — APPOINTMENT (OUTPATIENT)
Dept: PHYSICAL THERAPY | Facility: CLINIC | Age: 67
DRG: 056 | End: 2022-09-17
Attending: PHYSICAL MEDICINE & REHABILITATION
Payer: COMMERCIAL

## 2022-09-17 ENCOUNTER — APPOINTMENT (OUTPATIENT)
Dept: OCCUPATIONAL THERAPY | Facility: CLINIC | Age: 67
DRG: 056 | End: 2022-09-17
Attending: PHYSICAL MEDICINE & REHABILITATION
Payer: COMMERCIAL

## 2022-09-17 ENCOUNTER — APPOINTMENT (OUTPATIENT)
Dept: SPEECH THERAPY | Facility: CLINIC | Age: 67
DRG: 056 | End: 2022-09-17
Attending: PHYSICAL MEDICINE & REHABILITATION
Payer: COMMERCIAL

## 2022-09-17 PROCEDURE — 97130 THER IVNTJ EA ADDL 15 MIN: CPT | Mod: GN

## 2022-09-17 PROCEDURE — 250N000013 HC RX MED GY IP 250 OP 250 PS 637

## 2022-09-17 PROCEDURE — 250N000013 HC RX MED GY IP 250 OP 250 PS 637: Performed by: INTERNAL MEDICINE

## 2022-09-17 PROCEDURE — 97535 SELF CARE MNGMENT TRAINING: CPT | Mod: GO | Performed by: OCCUPATIONAL THERAPIST

## 2022-09-17 PROCEDURE — 250N000013 HC RX MED GY IP 250 OP 250 PS 637: Performed by: STUDENT IN AN ORGANIZED HEALTH CARE EDUCATION/TRAINING PROGRAM

## 2022-09-17 PROCEDURE — 128N000003 HC R&B REHAB

## 2022-09-17 PROCEDURE — 250N000011 HC RX IP 250 OP 636

## 2022-09-17 PROCEDURE — 97530 THERAPEUTIC ACTIVITIES: CPT | Mod: GP

## 2022-09-17 PROCEDURE — 97129 THER IVNTJ 1ST 15 MIN: CPT | Mod: GN

## 2022-09-17 PROCEDURE — 250N000013 HC RX MED GY IP 250 OP 250 PS 637: Performed by: PHYSICAL MEDICINE & REHABILITATION

## 2022-09-17 PROCEDURE — 92526 ORAL FUNCTION THERAPY: CPT | Mod: GN

## 2022-09-17 RX ADMIN — Medication 25 MG: at 21:00

## 2022-09-17 RX ADMIN — AMLODIPINE BESYLATE 10 MG: 10 TABLET ORAL at 08:39

## 2022-09-17 RX ADMIN — Medication 5 MG: at 21:01

## 2022-09-17 RX ADMIN — LETROZOLE 2.5 MG: 2.5 TABLET, FILM COATED ORAL at 21:00

## 2022-09-17 RX ADMIN — SENNOSIDES AND DOCUSATE SODIUM 2 TABLET: 8.6; 5 TABLET ORAL at 08:39

## 2022-09-17 RX ADMIN — CLOPIDOGREL BISULFATE 75 MG: 75 TABLET, FILM COATED ORAL at 08:39

## 2022-09-17 RX ADMIN — POLYETHYLENE GLYCOL 3350 17 G: 17 POWDER, FOR SOLUTION ORAL at 19:16

## 2022-09-17 RX ADMIN — PANTOPRAZOLE SODIUM 40 MG: 40 TABLET, DELAYED RELEASE ORAL at 08:39

## 2022-09-17 RX ADMIN — ASPIRIN 81 MG: 81 TABLET, COATED ORAL at 08:39

## 2022-09-17 RX ADMIN — HEPARIN SODIUM 5000 UNITS: 5000 INJECTION, SOLUTION INTRAVENOUS; SUBCUTANEOUS at 08:37

## 2022-09-17 RX ADMIN — HEPARIN SODIUM 5000 UNITS: 5000 INJECTION, SOLUTION INTRAVENOUS; SUBCUTANEOUS at 21:03

## 2022-09-17 RX ADMIN — LISINOPRIL 30 MG: 20 TABLET ORAL at 21:01

## 2022-09-17 RX ADMIN — ATORVASTATIN CALCIUM 80 MG: 80 TABLET, FILM COATED ORAL at 21:00

## 2022-09-17 ASSESSMENT — ACTIVITIES OF DAILY LIVING (ADL)
ADLS_ACUITY_SCORE: 46
ADLS_ACUITY_SCORE: 46
ADLS_ACUITY_SCORE: 47
ADLS_ACUITY_SCORE: 46
ADLS_ACUITY_SCORE: 47
ADLS_ACUITY_SCORE: 47
ADLS_ACUITY_SCORE: 46
ADLS_ACUITY_SCORE: 47
ADLS_ACUITY_SCORE: 46
ADLS_ACUITY_SCORE: 47

## 2022-09-17 NOTE — PLAN OF CARE
Goal Outcome Evaluation:    Plan of Care Reviewed With: patient     Overall Patient Progress: no change       A/O x4, VSS on RA.  Up with assist one with ubaldo magdaleno.  Continent of bowel and bladder, last BM 9/14.  Regular thin diet, moderately thick liquids. Takes pills well whole.  No c/o pain, no prn meds given.  Continue with POC.

## 2022-09-17 NOTE — PLAN OF CARE
Discharge Planner Post-Acute Rehab PT:      Discharge Plan: Home with  assist, 1 DAVE without rail. Home care vs OP PT, pending progress.     Precautions: Falls, L hemiplegia, dysphagia, breast CA (cleared for NMES)     Current Status:  Bed Mobility: SBA rolling and supine<>sit  Transfer: Ax1 SaraStedy with nsg. Min A with skilled set-up for squat pivot L/R  Gait: up to 20 ft, mod A for trunk and L LE mgmt at R rail + WC follow  Stairs: Unsafe  WC Propulsion: SBA- cues for safety with turning  Balance: Fair static/dynamic seated balance- LOB to the L primarily  PASS on 9/6: 13/36             on 9/13: 21/36     Assessment: Initiated ambulation in // bars with OTC L AFO with pre-gait activities and noted fairly good response (ie: lateral weight shift with L TKE, standing marches, and mini-squats).  Mod A for L TKE and L LE advancement.  Continue to need Lea Steady for safe transfers with nursing staff.   Other Barriers to Discharge (DME, Family Training, etc):   Equipment: Anticipate K4 WC, quad cane, gait belt, L Eunice Neurexa, L AFO  Family training to be scheduled.

## 2022-09-17 NOTE — PLAN OF CARE
"Discharge Planner Post-Acute Rehab SLP:     Discharge Plan:  SLP    Precautions: aspiration, fall    Current Status:  Communication: Word finding difficulties in conversation   Cognition: Mild-mod cognitive impairments with deficits re: memory, attention, reasoning, language. Benefits from extra time   Swallow: Regular, moderately thick (3) liquid. Upright fully all PO. Medications given as tolerated with 3 liquid or puree.     Assessment:  AM: Pt completed functional bill schedule task w written information. IND crossing info out as she went and using short-hand instead of full words (due to fine motor impairments). Pt reported some frustrations with feeling like it \"took me more time than it should have\" however is aware of reasons behind this. Pt able to recall therapy tasks from yesterday with OT and PT. Pt completed card task with alt attn/memory/problem solving - recalled if needed to add/subtract based on cards shown, x6 assist needed to recall most recent number computed. Pt reported fatigue at end of task.    PM: Pt demonstrated completing the following OME/PME w clinician present - towel tuck w hard swallow x10, tongue b/w teeth x10, hard swallow x5, Humberto's apple x5. Sips of moderately thick liquids b/w trials.    Other Barriers to Discharge (Family Training, etc): family training education re: thickened liquids (pending improvement) and IADL support   "

## 2022-09-17 NOTE — PROGRESS NOTES
"  [facility]   Acute Rehabilitation Unit  Daily progress note    INTERVAL HISTORY  Stephani Garcia was seen and examined at bedside.  No acute events reported overnight. This morning, patient reports that she is experiencing urinary urgency. Denies any hematuria or dysuria. No fevers, chills, chest pain, SOB, n/v or any other issues.     Functionally, Patient continues to be a full participant with therapies.  With physical therapy pt initiated ambulation in // bars with OTC L AFO with pre-gait activities and noted fairly good response (ie: lateral weight shift with L TKE, standing marches, and mini-squats).  Mod A for L TKE and L LE advancement.  Continue to need Lea Steady for safe transfers with nursing staff.  With occupational therapy pt working on ADLs with Lea Steady. She trialed squat pivot for toileting.  With SLP pt working to address mild-mod cognitive impairments and dysphagia - on mod thick liquids.     ROS: 10 point ROS negative other than the symptoms noted above in the HPI.    MEDICATIONS    amLODIPine  10 mg Oral Daily     aspirin  81 mg Oral Daily     atorvastatin  80 mg Oral QPM     clopidogrel  75 mg Oral Daily     heparin ANTICOAGULANT  5,000 Units Subcutaneous Q12H     letrozole  2.5 mg Oral At Bedtime     lisinopril  30 mg Oral At Bedtime     melatonin  5 mg Oral At Bedtime     pantoprazole  40 mg Oral QAM AC     traZODone  25 mg Oral At Bedtime        acetaminophen, hydrALAZINE, polyethylene glycol, senna-docusate     PHYSICAL EXAM  /75 (BP Location: Right arm, Patient Position: Supine, Cuff Size: Adult Regular)   Pulse 92   Temp (!) 96  F (35.6  C) (Oral)   Resp 16   Ht 1.702 m (5' 7\")   Wt 77.7 kg (171 lb 3.2 oz)   SpO2 97%   BMI 26.81 kg/m    General: No acute distress  HEENT: NC/NT, Moist mucous membranes  Pulmonary: Breathing comfortably on room air, clear to auscultation bilaterally  Cardiovascular: Regular rate and rhythm  Abdominal: " Non-tender, non-distended, bowel sounds present in all four quadrants   Extremities: warm, well perfused, no edema in bilateral lower extremities, no tenderness in calves  Neuro/MSK: Alert and oriented, face symmetric    LABS  No results found for this or any previous visit (from the past 24 hour(s)).    Rehabilitation - continue comprehensive acute inpatient rehabilitation program with multidisciplinary approach including therapies, rehab nursing, and physiatry following. See interval history for updates.      ASSESSMENT AND PLAN  Stephani Garcia is a 67 year old left hand dominant female with PMHx HTN, HLD, vertigo, GILDA (not using CPAP). Admitted for acute ischemic infarcts with right frontal lobe and right basal ganglia involvement. Has functional deficits of weak L. shoulder shrug, L. Hemiparesis, dysphagia, dysarthria, possbile neurogenic bowel/bladder and cognitive deficits. She also has possible L. Breast CA pending pathology results. She is admitted to undergo therapies with PT/OT/SLP.      - Vitals stable.   - Will check UA/UC given new urinary symptoms.   - Continue ongoing medical management.  - Continue therapies and plan of care.  - Refer to last progress note from primary team for full problems list.      I spent a total of 25 minutes face-to-face and managing the care of Stephani Garcia. Over 50% of my time was spent counseling the patient and coordinating care. Please see note for details.

## 2022-09-17 NOTE — PLAN OF CARE
FOCUS/GOAL  Bowel management    ASSESSMENT, INTERVENTIONS AND CONTINUING PLAN FOR GOAL:  Takes pills whole with immoderately thick liquids. No BM this shift, given 2 senna for bowels this am. Taking food et fluids well. Uses toilet in bathroom with one ubaldo steady. Alert et oriented x 4. Continent of bowel et bladder.

## 2022-09-17 NOTE — PLAN OF CARE
FOCUS/GOAL  Medical management    ASSESSMENT, INTERVENTIONS AND CONTINUING PLAN FOR GOAL:  Patient alert and oriented, able to make needs known  Calls appropriately  Slept comfortably overnight  No complained of pain, headache, chest pain, N&V, no SOB  Continent of Bladder, assisted to bathroom, voiding well, No BM this shift  Safety rounding checked completed, 3 side rails UP, bed alarm ON, call light in reach  May continue with POC.  Goal Outcome Evaluation:

## 2022-09-18 ENCOUNTER — APPOINTMENT (OUTPATIENT)
Dept: OCCUPATIONAL THERAPY | Facility: CLINIC | Age: 67
DRG: 056 | End: 2022-09-18
Attending: PHYSICAL MEDICINE & REHABILITATION
Payer: COMMERCIAL

## 2022-09-18 ENCOUNTER — APPOINTMENT (OUTPATIENT)
Dept: SPEECH THERAPY | Facility: CLINIC | Age: 67
DRG: 056 | End: 2022-09-18
Attending: PHYSICAL MEDICINE & REHABILITATION
Payer: COMMERCIAL

## 2022-09-18 ENCOUNTER — APPOINTMENT (OUTPATIENT)
Dept: PHYSICAL THERAPY | Facility: CLINIC | Age: 67
DRG: 056 | End: 2022-09-18
Attending: PHYSICAL MEDICINE & REHABILITATION
Payer: COMMERCIAL

## 2022-09-18 LAB
ALBUMIN UR-MCNC: NEGATIVE MG/DL
APPEARANCE UR: CLEAR
BILIRUB UR QL STRIP: NEGATIVE
COLOR UR AUTO: YELLOW
GLUCOSE UR STRIP-MCNC: NEGATIVE MG/DL
HGB UR QL STRIP: NEGATIVE
KETONES UR STRIP-MCNC: NEGATIVE MG/DL
LEUKOCYTE ESTERASE UR QL STRIP: ABNORMAL
MUCOUS THREADS #/AREA URNS LPF: PRESENT /LPF
NITRATE UR QL: NEGATIVE
PH UR STRIP: 5.5 [PH] (ref 5–7)
RBC URINE: <1 /HPF
SP GR UR STRIP: 1.02 (ref 1–1.03)
SQUAMOUS EPITHELIAL: 3 /HPF
UROBILINOGEN UR STRIP-MCNC: NORMAL MG/DL
WBC URINE: 12 /HPF

## 2022-09-18 PROCEDURE — 97535 SELF CARE MNGMENT TRAINING: CPT | Mod: GO

## 2022-09-18 PROCEDURE — 87086 URINE CULTURE/COLONY COUNT: CPT | Performed by: PHYSICAL MEDICINE & REHABILITATION

## 2022-09-18 PROCEDURE — 97530 THERAPEUTIC ACTIVITIES: CPT | Mod: GP | Performed by: PHYSICAL THERAPIST

## 2022-09-18 PROCEDURE — 99232 SBSQ HOSP IP/OBS MODERATE 35: CPT | Performed by: PHYSICAL MEDICINE & REHABILITATION

## 2022-09-18 PROCEDURE — 250N000013 HC RX MED GY IP 250 OP 250 PS 637

## 2022-09-18 PROCEDURE — 128N000003 HC R&B REHAB

## 2022-09-18 PROCEDURE — 250N000013 HC RX MED GY IP 250 OP 250 PS 637: Performed by: STUDENT IN AN ORGANIZED HEALTH CARE EDUCATION/TRAINING PROGRAM

## 2022-09-18 PROCEDURE — 250N000013 HC RX MED GY IP 250 OP 250 PS 637: Performed by: INTERNAL MEDICINE

## 2022-09-18 PROCEDURE — 81003 URINALYSIS AUTO W/O SCOPE: CPT | Performed by: PHYSICAL MEDICINE & REHABILITATION

## 2022-09-18 PROCEDURE — 97112 NEUROMUSCULAR REEDUCATION: CPT | Mod: GO

## 2022-09-18 PROCEDURE — 97112 NEUROMUSCULAR REEDUCATION: CPT | Mod: GP | Performed by: PHYSICAL THERAPIST

## 2022-09-18 PROCEDURE — 250N000011 HC RX IP 250 OP 636

## 2022-09-18 PROCEDURE — 250N000013 HC RX MED GY IP 250 OP 250 PS 637: Performed by: PHYSICAL MEDICINE & REHABILITATION

## 2022-09-18 PROCEDURE — 97129 THER IVNTJ 1ST 15 MIN: CPT | Mod: GN

## 2022-09-18 RX ADMIN — CLOPIDOGREL BISULFATE 75 MG: 75 TABLET, FILM COATED ORAL at 08:50

## 2022-09-18 RX ADMIN — Medication 5 MG: at 20:57

## 2022-09-18 RX ADMIN — PANTOPRAZOLE SODIUM 40 MG: 40 TABLET, DELAYED RELEASE ORAL at 08:49

## 2022-09-18 RX ADMIN — AMLODIPINE BESYLATE 10 MG: 10 TABLET ORAL at 08:49

## 2022-09-18 RX ADMIN — HEPARIN SODIUM 5000 UNITS: 5000 INJECTION, SOLUTION INTRAVENOUS; SUBCUTANEOUS at 20:55

## 2022-09-18 RX ADMIN — ATORVASTATIN CALCIUM 80 MG: 80 TABLET, FILM COATED ORAL at 20:57

## 2022-09-18 RX ADMIN — ASPIRIN 81 MG: 81 TABLET, COATED ORAL at 08:49

## 2022-09-18 RX ADMIN — LISINOPRIL 30 MG: 20 TABLET ORAL at 20:57

## 2022-09-18 RX ADMIN — Medication 25 MG: at 20:56

## 2022-09-18 RX ADMIN — SENNOSIDES AND DOCUSATE SODIUM 4 TABLET: 8.6; 5 TABLET ORAL at 22:22

## 2022-09-18 RX ADMIN — HEPARIN SODIUM 5000 UNITS: 5000 INJECTION, SOLUTION INTRAVENOUS; SUBCUTANEOUS at 08:49

## 2022-09-18 RX ADMIN — LETROZOLE 2.5 MG: 2.5 TABLET, FILM COATED ORAL at 20:57

## 2022-09-18 ASSESSMENT — ACTIVITIES OF DAILY LIVING (ADL)
ADLS_ACUITY_SCORE: 46

## 2022-09-18 NOTE — PLAN OF CARE
Goal Outcome Evaluation:    Plan of Care Reviewed With: patient     Overall Patient Progress: no change     A/O x4, VSS on RA.  Up with assist one with ubaldo magdaleno.  Continent of bowel and bladder, last BM 9/14. Prn miralax given this shift.  Regular thin diet, moderately thick liquids. Takes pills well whole.  No c/o pain.  Left breast biopsy dressing site changed per orders.  Continue with POC.

## 2022-09-18 NOTE — PLAN OF CARE
Skilled set up on :  Pt dependent for proper placement of electrodes on left quads, hamstrings, gastroc, and anterior tibialis for optimum muscle contraction, safe positioning on w/c within frame and cushion for prevention of skin breakdown, feet secured to foot pedals, w/c secured to  w/ Q-straints.  Passive motion assessed to ensure proper positioning.      Pt performed 31 minutes of active FES ergometry with 100% stimulation applied to above muscles at ~40rpm with 0.50<>1.04nm resistance.  This PT adjusted e-stim and cycling parameters in real-time to ensure palpable muscle contractions throughout session.  Please see [email or web address] for further details on patient's stimulation parameters and ergometry outcomes.      Changes in parameters that were part of this treatment:   -increase to intensity for quad, hams     Functional outcomes from this intervention include:   -reduced spasticity  -improved sensory awareness and proprioception  -improved muscle strength  -improved motor coordination    Session Summary:   -Average Power: 4.4W  -Asymmetry: Right 12%  -Active Minutes: 29:36

## 2022-09-18 NOTE — PLAN OF CARE
Goal Outcome Evaluation:  Patient is alert and oriented x4, able to make her needs known. Assisted to the bathroom x1. Assist of 1 with ubaldo magdaleno.Continent of bowel and bladder. Pt on a regular diet thin fluids. Takes pills whole. Denied pain during the shift, Continue with poc.    Patient's most recent vital signs are:     Vital signs:  BP: 134/75  Temp: 96  HR: 92  RR: 16  SpO2: 97 %     Patient does not have new respiratory symptoms.  Patient does not have new sore throat.  Patient does not have a fever greater than 99.5.

## 2022-09-18 NOTE — PLAN OF CARE
"Goal Outcome Evaluation:    Plan of Care Reviewed With: patient     Overall Patient Progress: no change    Outcome Evaluation: No complaints of pain. Pt has been continent, no BM this shift. Pt reported last BM \"maybe 3 days ago\". Declined senna/miralax, but agreed to take after therapies. 1 assist Lea Giraldo. Complained of frequent urination/discomfort, UA collected and sent to lab.      "

## 2022-09-18 NOTE — PLAN OF CARE
Discharge Planner Post-Acute Rehab SLP:     Discharge Plan:  SLP    Precautions: aspiration, fall    Current Status:  Communication: Word finding difficulties in conversation   Cognition: Mild-mod cognitive impairments with deficits re: memory, attention, reasoning, language. Benefits from extra time   Swallow: Regular, moderately thick (3) liquid. Upright fully all PO. Medications given as tolerated with 3 liquid or puree. VFSS scheduled for 9/19    Assessment: Pt educated re: plan for VFSS tomorrow, procedure, and goals. Pt verbalized understanding. Pt IND using pharyngeal exercise log and made section for today. Pt also increasing IND with exercises and consistency and high reps. Pt completed reps for CTAR + effortful swallow, Ariella, and Mendelsohn wiht min cues. Accepted sips level 3 liquid between reps, no s/sx aspiration. Pt participated in task using visual to answer functional math ?s. Required overall mod cues to breakdown information to organize thought process. Pt able to express correct steps to complete problems consistently but demonstrates breakdown with initiation and follow through with plan    Other Barriers to Discharge (Family Training, etc): family training education re: thickened liquids (pending improvement) and IADL support

## 2022-09-18 NOTE — PROGRESS NOTES
Discharge Planner Post-Acute Rehab OT:     Discharge Plan: home with spouse A, HC OT vs OP OT pending progress    Precautions: falls, dense L jong, monitor BP, LUE omoneurexa OOB for LUE mgmt, breast cancer    Current Status:  ADLs:    Mobility: Ax1 SaraStedy. Squat/stand pivot min Ax1 to R side, mod A L, needs skilled set up. Recommend Ax1 SaraStedy with nsg.    Grooming: SBA EOB wash face, Mod A wash hands. Set up/clean up oral cares.    Dressing: UB- Mod A. LB - Max A shorts rolling and reacher. Feet - Don socks SBA RLE, A to hold LLE in fig four.    Bathing: Max A sponge bathing. Transfer N/A d/t isolation precautions.     Toileting: Transfer SaraStedy <> commode overlay. Total A cares and clothing mgmt.   IADLs: IND PTA. Anticipate A at discharge.  Vision/Cognition: L strabismus baseline with pt reporting, pt reports worse vision in L eye at baseline. Functional deficits problem solving, memory, and wordfinding.    Assessment: Continues to be challenged carrying over s/u of squat pivot transfer wc <> EOB; improved performance wc <> OTC with gb. Initiated [employer] LUE grasp and release program, pt tolerated well. Memory deficits impacting carryover of LUE HEP outside of therapy sessions, plan to create tracker sheet/log book to assist.    Other Barriers to Discharge (DME, Family Training, etc): 1 stair to enter from garage, then all needs met on main level. Tub/ledge shower.     DME: Recommend ETB, OTC, reacher, yoana neurexa, temporary bed rail, gb in shower, gb next to toilets.     Family training - Not scheduled as of 9/6/22.

## 2022-09-19 ENCOUNTER — APPOINTMENT (OUTPATIENT)
Dept: PHYSICAL THERAPY | Facility: CLINIC | Age: 67
DRG: 056 | End: 2022-09-19
Attending: PHYSICAL MEDICINE & REHABILITATION
Payer: COMMERCIAL

## 2022-09-19 ENCOUNTER — APPOINTMENT (OUTPATIENT)
Dept: OCCUPATIONAL THERAPY | Facility: CLINIC | Age: 67
DRG: 056 | End: 2022-09-19
Attending: PHYSICAL MEDICINE & REHABILITATION
Payer: COMMERCIAL

## 2022-09-19 ENCOUNTER — APPOINTMENT (OUTPATIENT)
Dept: SPEECH THERAPY | Facility: CLINIC | Age: 67
DRG: 056 | End: 2022-09-19
Attending: PHYSICAL MEDICINE & REHABILITATION
Payer: COMMERCIAL

## 2022-09-19 LAB
BASOPHILS # BLD AUTO: 0 10E3/UL (ref 0–0.2)
BASOPHILS NFR BLD AUTO: 0 %
EOSINOPHIL # BLD AUTO: 0.3 10E3/UL (ref 0–0.7)
EOSINOPHIL NFR BLD AUTO: 3 %
ERYTHROCYTE [DISTWIDTH] IN BLOOD BY AUTOMATED COUNT: 13.2 % (ref 10–15)
HCT VFR BLD AUTO: 42.2 % (ref 35–47)
HGB BLD-MCNC: 14 G/DL (ref 11.7–15.7)
IMM GRANULOCYTES # BLD: 0 10E3/UL
IMM GRANULOCYTES NFR BLD: 0 %
LYMPHOCYTES # BLD AUTO: 1.3 10E3/UL (ref 0.8–5.3)
LYMPHOCYTES NFR BLD AUTO: 13 %
MCH RBC QN AUTO: 29 PG (ref 26.5–33)
MCHC RBC AUTO-ENTMCNC: 33.2 G/DL (ref 31.5–36.5)
MCV RBC AUTO: 87 FL (ref 78–100)
MONOCYTES # BLD AUTO: 0.6 10E3/UL (ref 0–1.3)
MONOCYTES NFR BLD AUTO: 6 %
NEUTROPHILS # BLD AUTO: 7.7 10E3/UL (ref 1.6–8.3)
NEUTROPHILS NFR BLD AUTO: 78 %
NRBC # BLD AUTO: 0 10E3/UL
NRBC BLD AUTO-RTO: 0 /100
PLATELET # BLD AUTO: 355 10E3/UL (ref 150–450)
RBC # BLD AUTO: 4.83 10E6/UL (ref 3.8–5.2)
WBC # BLD AUTO: 9.9 10E3/UL (ref 4–11)

## 2022-09-19 PROCEDURE — 92526 ORAL FUNCTION THERAPY: CPT | Mod: GN

## 2022-09-19 PROCEDURE — 250N000013 HC RX MED GY IP 250 OP 250 PS 637: Performed by: STUDENT IN AN ORGANIZED HEALTH CARE EDUCATION/TRAINING PROGRAM

## 2022-09-19 PROCEDURE — 99232 SBSQ HOSP IP/OBS MODERATE 35: CPT | Mod: GC | Performed by: PHYSICAL MEDICINE & REHABILITATION

## 2022-09-19 PROCEDURE — 250N000013 HC RX MED GY IP 250 OP 250 PS 637

## 2022-09-19 PROCEDURE — 97112 NEUROMUSCULAR REEDUCATION: CPT | Mod: GO | Performed by: STUDENT IN AN ORGANIZED HEALTH CARE EDUCATION/TRAINING PROGRAM

## 2022-09-19 PROCEDURE — 97535 SELF CARE MNGMENT TRAINING: CPT | Mod: GO | Performed by: STUDENT IN AN ORGANIZED HEALTH CARE EDUCATION/TRAINING PROGRAM

## 2022-09-19 PROCEDURE — 128N000003 HC R&B REHAB

## 2022-09-19 PROCEDURE — 85025 COMPLETE CBC W/AUTO DIFF WBC: CPT

## 2022-09-19 PROCEDURE — 97530 THERAPEUTIC ACTIVITIES: CPT | Mod: GP

## 2022-09-19 PROCEDURE — 36415 COLL VENOUS BLD VENIPUNCTURE: CPT

## 2022-09-19 PROCEDURE — 250N000011 HC RX IP 250 OP 636

## 2022-09-19 PROCEDURE — 99232 SBSQ HOSP IP/OBS MODERATE 35: CPT | Performed by: INTERNAL MEDICINE

## 2022-09-19 PROCEDURE — 250N000013 HC RX MED GY IP 250 OP 250 PS 637: Performed by: PHYSICAL MEDICINE & REHABILITATION

## 2022-09-19 PROCEDURE — 250N000013 HC RX MED GY IP 250 OP 250 PS 637: Performed by: INTERNAL MEDICINE

## 2022-09-19 PROCEDURE — 97129 THER IVNTJ 1ST 15 MIN: CPT | Mod: GN

## 2022-09-19 RX ORDER — BISACODYL 10 MG
10 SUPPOSITORY, RECTAL RECTAL DAILY PRN
Status: DISCONTINUED | OUTPATIENT
Start: 2022-09-19 | End: 2022-09-30 | Stop reason: HOSPADM

## 2022-09-19 RX ORDER — NITROFURANTOIN 25; 75 MG/1; MG/1
100 CAPSULE ORAL EVERY 12 HOURS SCHEDULED
Status: COMPLETED | OUTPATIENT
Start: 2022-09-19 | End: 2022-09-24

## 2022-09-19 RX ADMIN — NITROFURANTOIN (MONOHYDRATE/MACROCRYSTALS) 100 MG: 75; 25 CAPSULE ORAL at 17:32

## 2022-09-19 RX ADMIN — CLOPIDOGREL BISULFATE 75 MG: 75 TABLET, FILM COATED ORAL at 08:40

## 2022-09-19 RX ADMIN — HEPARIN SODIUM 5000 UNITS: 5000 INJECTION, SOLUTION INTRAVENOUS; SUBCUTANEOUS at 20:54

## 2022-09-19 RX ADMIN — ASPIRIN 81 MG: 81 TABLET, COATED ORAL at 08:40

## 2022-09-19 RX ADMIN — LISINOPRIL 30 MG: 20 TABLET ORAL at 20:54

## 2022-09-19 RX ADMIN — PANTOPRAZOLE SODIUM 40 MG: 40 TABLET, DELAYED RELEASE ORAL at 08:40

## 2022-09-19 RX ADMIN — LETROZOLE 2.5 MG: 2.5 TABLET, FILM COATED ORAL at 20:54

## 2022-09-19 RX ADMIN — Medication 5 MG: at 20:54

## 2022-09-19 RX ADMIN — ATORVASTATIN CALCIUM 80 MG: 80 TABLET, FILM COATED ORAL at 20:54

## 2022-09-19 RX ADMIN — HEPARIN SODIUM 5000 UNITS: 5000 INJECTION, SOLUTION INTRAVENOUS; SUBCUTANEOUS at 08:41

## 2022-09-19 RX ADMIN — BISACODYL 10 MG: 10 SUPPOSITORY RECTAL at 16:40

## 2022-09-19 RX ADMIN — Medication 25 MG: at 20:54

## 2022-09-19 RX ADMIN — SENNOSIDES AND DOCUSATE SODIUM 4 TABLET: 8.6; 5 TABLET ORAL at 08:40

## 2022-09-19 RX ADMIN — AMLODIPINE BESYLATE 10 MG: 10 TABLET ORAL at 08:40

## 2022-09-19 ASSESSMENT — ACTIVITIES OF DAILY LIVING (ADL)
ADLS_ACUITY_SCORE: 46
ADLS_ACUITY_SCORE: 44
ADLS_ACUITY_SCORE: 46
ADLS_ACUITY_SCORE: 46
ADLS_ACUITY_SCORE: 44
ADLS_ACUITY_SCORE: 46

## 2022-09-19 NOTE — PROGRESS NOTES
Oncology - Inpatient Consult Service Progress Note   Date of Service: 09/19/2022    Patient: Stephani Garcia  MRN: [id number]  Admission Date: 9/5/2022  Hospital Day # 14    Assessment & Recommendations:   Stephani Garcia is a 67-year-old female admitted to [facility] from 8/30-9/3/22 for an acute ischemic stroke involving right frontal lobe and right basal ganglia and subsequently discharged to ARU who was found to have an enlarging ulcerative left left breast mass which on cytology done 9/2/22 was consistent with ductal carcinoma ER strongly positive (> 95%; 3+), progesterone receptor: positive (75%; 3+) and HER2: equivocal (2+). She was remains admitted to ARU for ongoing rehabilitation, and seems to be slowly improving and regaining her neurological deficits. She is expected to remain in the ARU until ~9/26.     Recommendations:   - Requested follow-up with next available breast oncologist.  - Referral placed for core biopsy in breast clinic.   - Referral placed for outpatient PET/CT week of 10/3.   - Oncology consult team will formally sign off, however do not hesitate to contact if further questions/concerns.     # Newly-diagnosed ductal carcinoma ER/GA+  Admitted to [facility] from 8/30-9/3/22 for an acute ischemic stroke involving right frontal lobe and right basal ganglia and subsequently discharged to ARU who was found to have an enlarging ulcerative left left breast mass which on cytology done 9/2/22 was consistent with ductal carcinoma ER strongly positive (> 95%; 3+), progesterone receptor: positive (75%; 3+) and HER2: equivocal (2+). She was remains admitted to ARU for ongoing rehabilitation, and seems to be slowly improving and regaining her neurological deficits. She is expected to remain in the ARU until ~9/26. Her case was reviewed previously with breast oncologists, Dr. Galvez, Dr. Combs, with consensus that patient does indeed need aadditional workup to fully stage and  "characterize her disease. This would include a core biopsy from the breast and imaging with either CT CAP or PET/CT to assess for distant metastatic disease. Unfortunately, this can not take place as long as the patient is in the ARU.   - See note from 9/15 for full details, however in summary plan to initiate an aromatase inhibitor. This was discussed with patient including potential side effects of letrozole including myalgias, arthralgias, hot flashes, vaginal dryness, mood disorder, and thinning of the bones, but overall it is still tolerated well by most women. Patient agreeable and ordered letrozole 2.5 mg daily HS (9/15 - x) in effort to control her tumor while she is awaiting further workup and management.   - As of 9/19, patient states she is tolerating well without side effects. She is feeling well and continuing to work extensively with rehab at ARU.  - See above for requested follow-up.    We have requested follow up as above, final scheduling in process. We will formally sign off of patient at this time, however please do not hesitate to page with any questions or concerns. Patient was seen and plan of care was discussed with attending physician Dr. Solis.    Fela Gomez PA-C  Hematology/Oncology  #[id number]    =================================================================================      Interval History:  We talked to the patient via video visit this afternoon. She states she is doing well with rehab and making progress. She does feel like she has a UTI (leuk est +, UCx in process), but otherwise feeling well. No side effects from letrozole that she has noticed. Discussed follow up plan. Questions answered at bedside.     Physical Exam:    Blood pressure 122/63, pulse 97, temperature (!) 96  F (35.6  C), temperature source Oral, resp. rate 16, height 1.702 m (5' 7\"), weight 75.3 kg (166 lb), SpO2 96 %.  Detailed physical exam was deferred due to the virtual telephone nature of this encounter. "     Labs:   CMP  No lab results found in last 7 days.  CBC  Recent Labs   Lab 09/19/22  0802 09/14/22  0558   WBC 9.9  --    RBC 4.83  --    HGB 14.0  --    HCT 42.2  --    MCV 87  --    MCH 29.0  --    MCHC 33.2  --    RDW 13.2  --     295     INRNo lab results found in last 7 days.    Inpatient medication list:  Current Facility-Administered Medications   Medication     acetaminophen (TYLENOL) tablet 650 mg     amLODIPine (NORVASC) tablet 10 mg     aspirin EC tablet 81 mg     atorvastatin (LIPITOR) tablet 80 mg     clopidogrel (PLAVIX) tablet 75 mg     heparin ANTICOAGULANT injection 5,000 Units     hydrALAZINE (APRESOLINE) tablet 10 mg     letrozole (FEMARA) tablet 2.5 mg     lisinopril (ZESTRIL) tablet 30 mg     melatonin tablet 5 mg     nitroFURantoin macrocrystal-monohydrate (MACROBID) capsule 100 mg     pantoprazole (PROTONIX) EC tablet 40 mg     Patient is already receiving anticoagulation with heparin, enoxaparin (LOVENOX), warfarin (COUMADIN)  or other anticoagulant medication     polyethylene glycol (MIRALAX) Packet 17 g     senna-docusate (SENOKOT-S/PERICOLACE) 8.6-50 MG per tablet 1-4 tablet     traZODone (DESYREL) half-tab 25 mg

## 2022-09-19 NOTE — PLAN OF CARE
FOCUS/GOAL  Bowel management, Bladder management, and Medical management    ASSESSMENT, INTERVENTIONS AND CONTINUING PLAN FOR GOAL:  Pt has been continent bladder this shift with toileting offered q2-3h. LBM 9/14. PRN senna given, and PMR Resident MD paged with request for PRN suppository. Pt will start on macrobid this evening. Educated pt on this new order. She denied pain throughout shift. Will continue with POC.     Outcome Evaluation:    Plan of Care Reviewed With: patient     Overall Patient Progress: no change

## 2022-09-19 NOTE — PLAN OF CARE
Goal Outcome Evaluation:      Patient is alert and oriented x4, able to make her needs known, Denies  pain, headache, dizziness, CP, or SOB. A1 Lea rasta. Regular diet and moderately thick liquid. Continent of b/b, Left breast wound dressing intact. Isolation precaution maintaned. Safety rounds completed. Call light is within reach. Will continue with poc.      Patient's most recent vital signs are:     Vital signs:  BP: 132/73  Temp: 96.2  HR: 93  RR: 18  SpO2: 96 %     Patient does not have new respiratory symptoms.  Patient does not have new sore throat.  Patient does not have a fever greater than 99.5.

## 2022-09-19 NOTE — PROGRESS NOTES
[facility] declined due to insurance. [facility] Liaison assisting and sending referral to other partner agencies. Will continue to follow.     Yoselin Barkley [facility]   Direct Phone: [phone]  I   Pager: [phone]  I  Fax: [phone]

## 2022-09-19 NOTE — PROGRESS NOTES
Another med onc order received from Fela Gomez PA-C.  Patient still admitted to ARU with potential discharge planned for 9/26.  Writer received referral, reviewed for appropriate plan, and sent to New Patient Scheduling for completion.  Hailey Torres RN

## 2022-09-19 NOTE — PLAN OF CARE
Discharge Planner Post-Acute Rehab PT:      Discharge Plan: Home with  assist, 1 DAVE without rail. Home care vs OP PT, pending progress.     Precautions: Falls, L hemiplegia, dysphagia, breast CA (cleared for NMES)     Current Status:  Bed Mobility: SBA rolling and supine<>sit  Transfer: Ax1 SaraStedy with nsg. Min A with skilled set-up for squat pivot L/R  Gait: up to 20 ft, mod A for trunk and L LE mgmt at R rail + WC follow  Stairs: Unsafe  WC Propulsion: SBA- cues for safety with turning  Balance: Fair static/dynamic seated balance- LOB to the L primarily  PASS on 9/6: 13/36             on 9/13: 21/36     Assessment: Focus on IND with bed mobility and functional transfers.  W/c eval and orthotics order placed/set up today.  Pt able to complete squat pivot transfers with Marilynn, improved IND with set up and discussion about different parts of the w/c.      Other Barriers to Discharge (DME, Family Training, etc):   Equipment: Anticipate K4 WC, quad cane, gait belt, L Eunice Neurexa, L AFO  Family training to be scheduled

## 2022-09-19 NOTE — PROGRESS NOTES
Discharge Planner Post-Acute Rehab OT:     Discharge Plan: home with spouse A, HC OT vs OP OT pending progress    Precautions: falls, dense L jong, monitor BP, LUE omoneurexa OOB for LUE mgmt, breast cancer    Current Status:  ADLs:    Mobility: Ax1 SaraStedy. Squat/stand pivot min Ax1 to R side, mod A L, needs skilled set up. Recommend Ax1 SaraStedy with nsg.    Grooming: SBA EOB wash face, Mod A wash hands. Set up/clean up oral cares.    Dressing: UB- Mod A. LB - Max A shorts rolling and reacher. Feet - Don socks SBA RLE, A to hold LLE in fig four.    Bathing: Max A sponge bathing. Transfer N/A d/t isolation precautions.     Toileting: Transfer SaraStedy <> commode overlay. Total A cares and clothing mgmt.   IADLs: IND PTA. Anticipate A at discharge.  Vision/Cognition: L strabismus baseline with pt reporting, pt reports worse vision in L eye at baseline. Functional deficits problem solving, memory, and wordfinding.    Assessment: Skilled set up on :  Pt dependent for proper placement of electrodes on LUE for optimum muscle contraction, safe positioning on w/c within frame and cushion for prevention of skin breakdown, feet secured to foot pedals, w/c secured to  w/ Q-straints.  Passive motion assessed to ensure proper positioning.      Pt performed 30 minutes of active FES ergometry with 0-100% stimulation applied to above muscles at 30 rpm with .5nm resistance.  This OT adjusted e-stim and cycling parameters in real-time to ensure palpable muscle contractions throughout session.  Please see [email or web address] for further details on patient's stimulation parameters and ergometry outcomes.      Changes in parameters that were part of this treatment:   -resistance  -pulse width, frequency  -amplitude  -pedal speed      Functional outcomes from this intervention include:   -reduced spasticity  -improved sensory awareness and proprioception  -improved muscle strength  -improved motor coordination      Other  Barriers to Discharge (DME, Family Training, etc): 1 stair to enter from garage, then all needs met on main level. Tub/ledge shower.     DME: Recommend ETB, OTC, reacher, yoana neurexa, temporary bed rail, gb in shower, gb next to toilets.     Family training - Not scheduled as of 9/6/22.

## 2022-09-19 NOTE — PLAN OF CARE
FOCUS/GOAL  Bowel management, Bladder management, Pain management, Mobility, Skin integrity, and Safety management    ASSESSMENT, INTERVENTIONS AND CONTINUING PLAN FOR GOAL:  Patient is alert and oriented x 4. Denied pain, headache, dizziness, CP or SOB. A-1 ubaldo steady. Regular/ moderately thick liquid. Continent of bladder no BM  in this shift. PRN Senna given. Left breast wound dressing changed. Isolation precaution maintained. Call light is in reach alarm is on. Staff will continue to monitor.  Goal Outcome Evaluation:

## 2022-09-19 NOTE — PLAN OF CARE
Discharge Planner Post-Acute Rehab SLP:     Discharge Plan:  SLP    Precautions: aspiration, fall    Current Status:  Communication: Word finding difficulties in conversation   Cognition: Mild-mod cognitive impairments with deficits re: memory, attention, reasoning, language. Benefits from extra time   Swallow: Regular, moderately thick (3) liquid. Upright fully all PO. Medications given as tolerated with 3 liquid or puree. VFSS scheduled for 9/20     Assessment: Pt participated in further completion of task from yesterday using visual to solve function math ?s. Pt with ongoing difficulty organizing thought process and orienting back to information in problem. Overall, completed with mod cues 100% accuracy. Scheduling error with VFSS today, SLP rescheduled for tomorrow. Pt participated in reps of pharyngeal exercises for effortful swallow + CTAR, effortful swallow, Ariella, and Mendelsohn. Pt benefits from ongoing encourgment to increase reps completed.     Other Barriers to Discharge (Family Training, etc): family training education re: thickened liquids (pending improvement) and IADL support

## 2022-09-19 NOTE — PROGRESS NOTES
"  [facility]   Acute Rehabilitation Unit    INTERVAL HISTORY  Stephani reports some burning with urination and lower abdominal pain, consistent with previous UTIs. She feels like she has another one, and is requesting antibiotics.    Nursing notes reviewed, no acute events overnight.   Continent of bladder, Continent of bowel, Last BM 9/14. Denies chest pain abdominal pain, shortness of breath. No further concerns.    Functionally    With PT: FES bike for LLE    With OT: Max assist with sponge bathing, recommend assist x1 with Lea Colindres.     With SLP: Ongoing difficulty with organizing thought processes and orienting back to information in the problem. 100% accuracy with mod cues. VFSS tomorrow          MEDICATIONS  Scheduled meds    amLODIPine  10 mg Oral Daily     aspirin  81 mg Oral Daily     atorvastatin  80 mg Oral QPM     clopidogrel  75 mg Oral Daily     heparin ANTICOAGULANT  5,000 Units Subcutaneous Q12H     letrozole  2.5 mg Oral At Bedtime     lisinopril  30 mg Oral At Bedtime     melatonin  5 mg Oral At Bedtime     pantoprazole  40 mg Oral QAM AC     traZODone  25 mg Oral At Bedtime       PRN meds:  acetaminophen, hydrALAZINE, polyethylene glycol, senna-docusate      PHYSICAL EXAM  /63 (BP Location: Right arm)   Pulse 97   Temp (!) 96  F (35.6  C) (Oral)   Resp 16   Ht 1.702 m (5' 7\")   Wt 75.3 kg (166 lb)   SpO2 96%   BMI 26.00 kg/m    Gen: awake, alert, pleasant  HEENT: EOMI, L strabismus  Cardio: RRR, no murmur  Pulm: on room air, lungs CTA bilaterally  Abd: soft, nontender, nondistended  Ext: moves RUE and RLE when asked  Neuro: sensation intact to soft touch  MSK: strength 4/5 in L HF, 5/5 in R HF    LABS  Results for orders placed or performed during the hospital encounter of 09/05/22 (from the past 24 hour(s))   CBC with Platelets & Differential    Narrative    The following orders were created for panel order CBC with Platelets & " Differential.  Procedure                               Abnormality         Status                     ---------                               -----------         ------                     CBC with platelets and d...[[id number]]                      Final result                 Please view results for these tests on the individual orders.   CBC with platelets and differential   Result Value Ref Range    WBC Count 9.9 4.0 - 11.0 10e3/uL    RBC Count 4.83 3.80 - 5.20 10e6/uL    Hemoglobin 14.0 11.7 - 15.7 g/dL    Hematocrit 42.2 35.0 - 47.0 %    MCV 87 78 - 100 fL    MCH 29.0 26.5 - 33.0 pg    MCHC 33.2 31.5 - 36.5 g/dL    RDW 13.2 10.0 - 15.0 %    Platelet Count 355 150 - 450 10e3/uL    % Neutrophils 78 %    % Lymphocytes 13 %    % Monocytes 6 %    % Eosinophils 3 %    % Basophils 0 %    % Immature Granulocytes 0 %    NRBCs per 100 WBC 0 <1 /100    Absolute Neutrophils 7.7 1.6 - 8.3 10e3/uL    Absolute Lymphocytes 1.3 0.8 - 5.3 10e3/uL    Absolute Monocytes 0.6 0.0 - 1.3 10e3/uL    Absolute Eosinophils 0.3 0.0 - 0.7 10e3/uL    Absolute Basophils 0.0 0.0 - 0.2 10e3/uL    Absolute Immature Granulocytes 0.0 <=0.4 10e3/uL    Absolute NRBCs 0.0 10e3/uL       ASSESSMENT AND PLAN    Stephani Garcia is a 67 year old left hand dominant female with PMHx HTN, HLD, vertigo, GILDA (not using CPAP). Admitted for acute ischemic infarcts with right frontal lobe and right basal ganglia involvement. Has functional deficits of weak L. shoulder shrug, L. Hemiparesis, dysphagia, dysarthria, possbile neurogenic bowel/bladder and cognitive deficits. She also has possible L. Breast CA pending pathology results. She is admitted to undergo therapies with PT/OT/SLP.     Admission to acute inpatient rehab: 9/5/22  Impairment group code: 01.1  Stroke Ischemic  (L) Body Involvement (R) Brain: small cluster of recent infarcts in the right frontal lobe and right basal ganglia.       # Acute ischemic stroke   # Recent infarcts in the right frontal  "lobe and right basal ganglia  Noted left-sided partial hemiparesis and mild dysarthria/expressive aphasia at presentation 8/30. MRI confirmed  Acute ischemic stroke, and TPA/ thrombectomy 2/2 unknown last known normal. CTA showed no large vessel occlusion, with only 30% of ICA occlusion. Echo was normal EF 65% on 8/31. No Afib noted with telemetry.  at baseline. BP goal is <140/90 at rest, anticipate increase with therapies.   - Plavix 75 mg and aspirin 81 mg until 11/30   -aspirin 325mg starting 12/1  -  Atorvastatin 80 mg    - PT/OT/SLP     # Dysphagia    Video swallow study on 9/2/22: \"Patient had direct, silent aspiration with mildly thick liquid on first trial and deep penetration with mildly thick liquids on subsequent trials. No aspiration or penetration with moderately thick liquids.   - Continue dysphagia diet with moderately thick fluids  - SLP  - repeat swallow study for 9/20     # Left Breast Ductal Carcinoma   FNA 9/2. There is a high suspicion for breast CA. Surgery rec'd neoadjuvent treatment with hopes of shrinking tumor prior to surgery. Unable to surgically operate due to recent stroke  - Needs referral to oncology for outpatient follow-up.  --9/7 biopsy results: ductal carcinoma and estrogen and progesterone positive.     - [facility]  - [facility] consult: appreciate recs 9/6   -Letrozole 2.5 mg daily     # Hyperlipidemia    at baseline.  - Atorvastatin 80 mg      # Hypertension  Was allowed permissive HTN, first 24 hours but started to bring it down slowly. Slowly decrease BP, with goal to keep <140/90 at rest.  - Lisinopril 30 mg daily  - Amlodipine 10 mg Daily 9/15  - Hydralazine 10 mg PRN 9/6 for SBP over 170     # Neurogenic Bladder  - Timed voiding  - purewick HS     # Bowel   No BM since 9/14  - PRN Senna and Miralax  - PRN bisacodyl suppository     # Sleep   - Melatonin 5 mg at bedtime  - Trazodone 25 mg at bedtime     # Asymptomatic COVID 19 Infection " - Resolved  Positive Covid test on 8/3 with preceding cold symptoms for 3 days prior. Currently she continues remain asymptomatic from COVID symptoms on 9/5. She has no cough, wheezing, SOB, fever, chills, body aches or any type of cold symptoms.  She has completed remdesivir x 3 days.  Off of quarantine     #GILDA (obstructive sleep apnea):  Patient does not use CPAP at baseline. May need repeat sleep study and eval by sleep medicine team.   - encourage using CPAP         6. Adjustment to disability:  Clinical psychology to eval and treat, seen 9/13  7. FEN: Regular Diet Adult Liquidized/Moderately Thick (level 3)  8. Bowel: PRN Senna and Miralax  9. Bladder: Timed voiding  10. DVT Prophylaxis: Heparin 5000 units for DVT ppx. Plavix 75 mg daily and ASA 81 mg 90 days then ASA alone.   11. GI Prophylaxis: Pantoprazole 40 mg   12. Code: Full  13. Disposition: Home  14. ELOS:  9/26/2022  15. Rehab prognosis:  Fair  16. Follow up Appointments on Discharge:   - PCP follow up 7-14 days post discharge  - Follow-up with neurology in 6 to 8 weeks  - Referral for oncology given biopsy results   - GILDA: outpatient referral to sleep medicine clinic if agreeable        Seen and discussed with Dr. Marcos, PM&R staff physician     Corinne Macias DO  PGY-2  Physical Medicine and Rehabilitation

## 2022-09-20 ENCOUNTER — APPOINTMENT (OUTPATIENT)
Dept: OCCUPATIONAL THERAPY | Facility: CLINIC | Age: 67
DRG: 056 | End: 2022-09-20
Attending: PHYSICAL MEDICINE & REHABILITATION
Payer: COMMERCIAL

## 2022-09-20 ENCOUNTER — APPOINTMENT (OUTPATIENT)
Dept: SPEECH THERAPY | Facility: CLINIC | Age: 67
DRG: 056 | End: 2022-09-20
Attending: PHYSICAL MEDICINE & REHABILITATION
Payer: COMMERCIAL

## 2022-09-20 ENCOUNTER — APPOINTMENT (OUTPATIENT)
Dept: PHYSICAL THERAPY | Facility: CLINIC | Age: 67
DRG: 056 | End: 2022-09-20
Attending: PHYSICAL MEDICINE & REHABILITATION
Payer: COMMERCIAL

## 2022-09-20 ENCOUNTER — APPOINTMENT (OUTPATIENT)
Dept: GENERAL RADIOLOGY | Facility: CLINIC | Age: 67
DRG: 056 | End: 2022-09-20
Attending: STUDENT IN AN ORGANIZED HEALTH CARE EDUCATION/TRAINING PROGRAM
Payer: COMMERCIAL

## 2022-09-20 LAB — BACTERIA UR CULT: NORMAL

## 2022-09-20 PROCEDURE — 74230 X-RAY XM SWLNG FUNCJ C+: CPT

## 2022-09-20 PROCEDURE — 250N000013 HC RX MED GY IP 250 OP 250 PS 637: Performed by: INTERNAL MEDICINE

## 2022-09-20 PROCEDURE — 250N000013 HC RX MED GY IP 250 OP 250 PS 637

## 2022-09-20 PROCEDURE — 999N000125 HC STATISTIC PATIENT MED CONFERENCE < 30 MIN

## 2022-09-20 PROCEDURE — 999N000150 HC STATISTIC PT MED CONFERENCE < 30 MIN: Performed by: PHYSICAL THERAPIST

## 2022-09-20 PROCEDURE — 250N000013 HC RX MED GY IP 250 OP 250 PS 637: Performed by: PHYSICAL MEDICINE & REHABILITATION

## 2022-09-20 PROCEDURE — 92611 MOTION FLUOROSCOPY/SWALLOW: CPT | Mod: GN

## 2022-09-20 PROCEDURE — 97535 SELF CARE MNGMENT TRAINING: CPT | Mod: GO

## 2022-09-20 PROCEDURE — 90832 PSYTX W PT 30 MINUTES: CPT | Mod: GT | Performed by: PSYCHOLOGIST

## 2022-09-20 PROCEDURE — 250N000013 HC RX MED GY IP 250 OP 250 PS 637: Performed by: STUDENT IN AN ORGANIZED HEALTH CARE EDUCATION/TRAINING PROGRAM

## 2022-09-20 PROCEDURE — 128N000003 HC R&B REHAB

## 2022-09-20 PROCEDURE — 97129 THER IVNTJ 1ST 15 MIN: CPT | Mod: GN

## 2022-09-20 PROCEDURE — 92526 ORAL FUNCTION THERAPY: CPT | Mod: GN

## 2022-09-20 PROCEDURE — 97112 NEUROMUSCULAR REEDUCATION: CPT | Mod: GP | Performed by: PHYSICAL THERAPIST

## 2022-09-20 PROCEDURE — 99233 SBSQ HOSP IP/OBS HIGH 50: CPT | Mod: GC | Performed by: PHYSICAL MEDICINE & REHABILITATION

## 2022-09-20 PROCEDURE — 97110 THERAPEUTIC EXERCISES: CPT | Mod: GO

## 2022-09-20 PROCEDURE — 92507 TX SP LANG VOICE COMM INDIV: CPT | Mod: GN

## 2022-09-20 PROCEDURE — 250N000011 HC RX IP 250 OP 636

## 2022-09-20 PROCEDURE — 74230 X-RAY XM SWLNG FUNCJ C+: CPT | Mod: 26 | Performed by: STUDENT IN AN ORGANIZED HEALTH CARE EDUCATION/TRAINING PROGRAM

## 2022-09-20 RX ADMIN — CLOPIDOGREL BISULFATE 75 MG: 75 TABLET, FILM COATED ORAL at 07:52

## 2022-09-20 RX ADMIN — LETROZOLE 2.5 MG: 2.5 TABLET, FILM COATED ORAL at 20:12

## 2022-09-20 RX ADMIN — ASPIRIN 81 MG: 81 TABLET, COATED ORAL at 07:51

## 2022-09-20 RX ADMIN — ATORVASTATIN CALCIUM 80 MG: 80 TABLET, FILM COATED ORAL at 20:12

## 2022-09-20 RX ADMIN — LISINOPRIL 30 MG: 20 TABLET ORAL at 20:12

## 2022-09-20 RX ADMIN — Medication 25 MG: at 20:12

## 2022-09-20 RX ADMIN — NITROFURANTOIN (MONOHYDRATE/MACROCRYSTALS) 100 MG: 75; 25 CAPSULE ORAL at 18:22

## 2022-09-20 RX ADMIN — HEPARIN SODIUM 5000 UNITS: 5000 INJECTION, SOLUTION INTRAVENOUS; SUBCUTANEOUS at 20:16

## 2022-09-20 RX ADMIN — HEPARIN SODIUM 5000 UNITS: 5000 INJECTION, SOLUTION INTRAVENOUS; SUBCUTANEOUS at 08:00

## 2022-09-20 RX ADMIN — PANTOPRAZOLE SODIUM 40 MG: 40 TABLET, DELAYED RELEASE ORAL at 07:51

## 2022-09-20 RX ADMIN — NITROFURANTOIN (MONOHYDRATE/MACROCRYSTALS) 100 MG: 75; 25 CAPSULE ORAL at 05:50

## 2022-09-20 RX ADMIN — Medication 5 MG: at 20:12

## 2022-09-20 RX ADMIN — AMLODIPINE BESYLATE 10 MG: 10 TABLET ORAL at 07:51

## 2022-09-20 ASSESSMENT — ACTIVITIES OF DAILY LIVING (ADL)
ADLS_ACUITY_SCORE: 48
ADLS_ACUITY_SCORE: 44
ADLS_ACUITY_SCORE: 47
ADLS_ACUITY_SCORE: 47
ADLS_ACUITY_SCORE: 48
ADLS_ACUITY_SCORE: 44
ADLS_ACUITY_SCORE: 48
ADLS_ACUITY_SCORE: 44
ADLS_ACUITY_SCORE: 48
ADLS_ACUITY_SCORE: 47
ADLS_ACUITY_SCORE: 47
ADLS_ACUITY_SCORE: 44

## 2022-09-20 NOTE — PLAN OF CARE
FOCUS/GOAL  Bowel management, Bladder management and Pain management    ASSESSMENT, INTERVENTIONS AND CONTINUING PLAN FOR GOAL:  Pt is alert and oriented x4, denies fever, chills, chest pain, SOB, N/V, abdominal pain, or new weakness/numbness/tingling, continent of bowel and bladder, transferring with assist of 1-2 with ubaldo stedy to bathroom, sleeping off and on throughout the night, no tubes, lines or drains, vs stable, dressing intact to left breast, and Mepilex to coccyx,  no further care concerns at this time continue with POC.         Goal Outcome Evaluation: No change

## 2022-09-20 NOTE — CARE CONFERENCE
Acute Rehab Care Conference/Team Rounds      Type: Team Rounds    Present: Dr. Andrew Marcos, Corinne Macias Resident MD, Gunner León PT, Richard Chris OT, Hamida Perkins SLP, Yoselin Barkley [facility], Radha Mendez, Sagar Senior RN, and Stephani Garcia Patient     Discharge Barriers/Treatment/Education    Rehab Diagnosis: post CVA     Active Medical Co-morbidities/Prognosis:     Patient Active Problem List   Diagnosis Code     Acute ischemic stroke (H) I63.9     Hyperlipidemia E78.5     Hypertension I10     GILDA (obstructive sleep apnea) G47.33     Infarction of right basal ganglia (H) I63.9         Safety: Pt is alert and oriented, calling for needs, A of 1 with Lea Giraldo. Bed alarms on for safety.    Pain: No reports of pain on evening or NOC shifts.     Medications, Skin, Tubes/Lines: Pt is taking medications with moderately thickened fluids, would benefit from MAP prior to discharge, dressing intact to R breast changed daily, Mepilex to coccyx, no tubes/lines in place.     Swallowing/Nutrition: Regular, moderately thick (3) liquid. Upright fully all PO. Medications given as tolerated with 3 liquid or puree. VFSS scheduled for 9/20     Bowel/Bladder: Pt is continent of bowel and bladder. LBM 9/19 post suppository.     Psychosocial: Lives in a two story townhouse with spouse. Indep PTA. No drive in last 30 years, family assists. No mental health or substance abuse reported. Good support from  and son, another son lives further away. Works for the [employer] as a . No financial concerns reported.     ADLs/IADLs: ADL/IADL: Pt demonstrates gradual progress with functional transfers, seated/standing balance in SaraStedy/skilled assist, and proximal LUE strength; continues to require skilled set up with squat pivot transfers including toileting routine for safety and requires SaraStedy for transfers with nsg at this time. Progression with transfers limited by functional cognition deficits impacting safe and  consistent set up of wc; however, anticipate this likely to improve with increase practice and therapy focus. Recommend extending discharge date to maximize progress and initiate family training.    Mobility:   Bed Mobility: SBA rolling and supine<>sit  Transfer: Ax1 Georgiay with nsg. Min A with skilled set-up for squat pivot L/R  Gait: up to 20 ft, mod A for trunk and L LE mgmt at R rail + WC follow  Stairs: Unsafe  WC Propulsion: SBA- cues for safety with turning  Balance: Fair static/dynamic seated balance- LOB to the L primarily  PASS on 9/6: 13/36             on 9/13: 21/36  Prior to safe discharge, pt will need to complete family training. Requests for WC evaluation and L AFO orthotics consult have been initiated. In addition, anticipate need for gait belt, L Eunice Neurexa, and possibly quad cane for transfers.    Cognition/Language: Mild-mod cognitive impairments with deficits re: memory, attention, reasoning, language. Benefits from extra time. Improving in reasoning and problem solving, still demonstrating difficulty with organization.     Community Re-Entry: Plan for continued therapies to progress community mobility tolerance and safety. Plan for WC-based household/community mobility at discharge.    Transportation: /son to provide. Will continue to practice safety and IND with car transfers.    Decision maker: self    Plan of Care and goals reviewed and updated.    Discharge Plan/Recommendations    Fall Precautions: continue    Patient/Family input to goals: yes     Estimated length of stay: 18 days     Overall plan for the patient:  Reach a level of mod I       Utilization Review and Continued Stay Justification    Medical Necessity Criteria:    For any criteria that is not met, please document reason and plan for discharge, transfer, or modification of plan of care to address.    Requires intensive rehabilitation program to treat functional deficits?: Yes    Requires 3x per week or greater  involvement of rehabilitation physician to oversee rehabilitation program?: Yes    Requires rehabilitation nursing interventions?: Yes    Patient is making functional progress?: Yes    There is a potential for additional functional progress? Yes    Patient is participating in therapy 3 hours per day a minimum of 5 days per week or 15 hours per week in 7 day period?:Yes    Has discharge needs that require coordinated discharge planning approach?:Yes      Barriers/Concerns related to meeting medical necessity criteria:  none    Team Plan to Address Concern:  As needed       Final Physician Sign off    Statement of Approval:  Andrew Marcos, DO      Patient Goals  Social Work Goals: Confirm discharge recommendations with therapy, coordinate safe discharge plan and remain available to support and assist as needed.    OT Predicted Duration/Target Date for Goal Attainment: 09/30/22  Therapy Frequency (OT): Daily  OT: Hygiene/Grooming: supervision/stand-by assist, within precautions  OT: Upper Body Dressing: Supervision/stand-by assist, within precautions  OT: Lower Body Dressing: Minimal assist, using adaptive equipment, within precautions  OT: Upper Body Bathing: Minimal assist, using adaptive equipment, within precautions  OT: Lower Body Bathing: Minimal assist, using adaptive equipment, with precautions  OT: Bed Mobility: Modified independent, within precautions  OT: Transfer: Supervision/stand-by assist, with assistive device, within precautions  OT: Toilet Transfer/Toileting: Minimal assist, cleaning and garment management, using adaptive equipment, within precautions  OT: Meal Preparation: Minimal assist, using adaptive equipment, with simple meal preparation  OT: Home Management: Minimal assist, with light demand household tasks, within precautions, using adaptive equipment  OT: Cognitive: Patient/caregiver will verbalize understanding of cognitive assessment results/recommendations as needed for safe  discharge planning                                   PT Predicted Duration/Target Date for Goal Attainment: 09/26/22  PT Frequency: Daily  PT: Bed Mobility: Independent, Supine to/from sit, Rolling, Bridging  PT: Transfers: Supervision/stand-by assist, Sit to/from stand, Bed to/from chair, Assistive device  PT: Gait: Quad cane, 25 feet, Minimal assist  PT: Stairs: 1 stair, Assistive device, Minimal assist  PT: Wheelchair Mobility: Caregiver SBA, 150 feet  PT: Goal 1: Pt will be able to complete car transfer with quad cane and Marilynn in order to access home and medical appts.  PT: Goal 2: Pt will be able to complete floor>furniture transfer with mod A as part of fall recovery training.  PT: Goal 3: Pt will be IND with HEP for L LE/UE strengthening to help reduce risk of future falls.     SLP Predicted Duration/Target Date for Goal Attainment: 09/27/22  Therapy Frequency (SLP Eval): daily  SLP: Safely tolerate diet without signs/symptoms of aspiration: Regular diet, Thin liquids, Independently, With use of swallow precautions  SLP: Goal 1: Pt will demonstrate and initiate a plan of mod complexity with min cues 90% accuracy  SLP: Goal 2: Pt will recall novel and functional information of mod complexty with min cues 90% accuracu  SLP: Goal 3: Pt will implement word finding strategies within conversations with min cues 90% accuracy            Nursing Goals  Bowel and Bladder care  Fall prevention   Medication Education  Skin Care protection

## 2022-09-20 NOTE — PLAN OF CARE
Discharge Planner Post-Acute Rehab SLP:     Discharge Plan:  SLP    Precautions: aspiration, fall    Current Status:  Communication: Word finding difficulties in conversation   Cognition: Mild-mod cognitive impairments with deficits re: memory, attention, reasoning, language. Benefits from extra time   Swallow: Regular, moderately thick (3) liquid. Upright fully all PO. Medications given as tolerated with 3 liquid or puree.      Assessment:  Pt IND completing pharyngeal exercises and using exercise log to record reps appropriately.Pt educated re: plan for VFSS today, procedure, and goals. Pt expressed ?s related to outcome of VFSS and relation to dc timing. SLP addressed all concerns and answered ?s. Pt participated in task using visual to solve and answer functional math ?s. Pt able to implement strategies IND and sequence steps to solve problems. Required few instances mod cues to remind pt where she 'left off' and further aid organizing thought process. Ongoing improvement noted in reasoning and sequencing. However, continues to demonstrate impaired organization and processing time    PM: Pt seen for repeat VFSS on this date. Pt presents with significant improved oropharyngeal swallow function and demonstrated no aspiration across all consistencies. Pt with few instances reduced laryngeal vestibule closure resulting in intermittent flash penetration with mildly thick (2) and large sips thin (0) liquid. Recommend pt continue regular texture, advance to level 0 liquid. straws ok. Ensure upright fully with all PO. Pt continues to depict dry cough not directly following PO or related to PO. Likely lingering COVID impact. SLP to make diet advancement tomorrow if tolerance is consistent across meal and larger intake.    Other Barriers to Discharge (Family Training, etc): family training education re: thickened liquids (pending improvement) and IADL support

## 2022-09-20 NOTE — PROGRESS NOTES
"  [facility]   Acute Rehabilitation Unit    INTERVAL HISTORY  Stephani reports feeling better this morning, with less burning with urination. She feels better about having a BM yesterday as well. Discussed moving her discharge date to 9/30, since the team feels that she would benefit from the extra days of therapy. Stephani would like to go home, but agrees that the extra therapy will help and is comfortable with staying a little longer.     Nursing notes reviewed, no acute events overnight.   Continent of bladder, Continent of bowel, Last BM 9/19. Denies chest pain abdominal pain, shortness of breath. No further concerns.    Functionally    See care conference notes          MEDICATIONS  Scheduled meds    amLODIPine  10 mg Oral Daily     aspirin  81 mg Oral Daily     atorvastatin  80 mg Oral QPM     clopidogrel  75 mg Oral Daily     heparin ANTICOAGULANT  5,000 Units Subcutaneous Q12H     letrozole  2.5 mg Oral At Bedtime     lisinopril  30 mg Oral At Bedtime     melatonin  5 mg Oral At Bedtime     nitroFURantoin macrocrystal-monohydrate  100 mg Oral Q12H NEIL (08/20)     pantoprazole  40 mg Oral QAM AC     traZODone  25 mg Oral At Bedtime       PRN meds:  acetaminophen, bisacodyl, hydrALAZINE, polyethylene glycol, senna-docusate      PHYSICAL EXAM  /78 (BP Location: Right arm, Patient Position: Supine, Cuff Size: Adult Regular)   Pulse 89   Temp (!) 96.6  F (35.9  C) (Oral)   Resp 16   Ht 1.702 m (5' 7\")   Wt 75.3 kg (166 lb)   SpO2 94%   BMI 26.00 kg/m    Gen: awake, alert  HEENT: EOMI, L eye deviation from midline, per baseline  Cardio: well perfused extremities  Pulm: on room air, no increased work of breathing  Abd: soft, nontender, nondistended  Ext: moves RLE and RUE freely, improved motion in LLE, LUE without volitional movement  Neuro: sensation intact to soft touch at BUE  MSK: strength at least 3/5 in bilateral HF    LABS  No results found for this or any " "previous visit (from the past 24 hour(s)).    ASSESSMENT AND PLAN  Stephani Garcia is a 67 year old left hand dominant female with PMHx HTN, HLD, vertigo, GILDA (not using CPAP). Admitted for acute ischemic infarcts with right frontal lobe and right basal ganglia involvement. Has functional deficits of weak L. shoulder shrug, L. Hemiparesis, dysphagia, dysarthria, possbile neurogenic bowel/bladder and cognitive deficits. She also has possible L. Breast CA pending pathology results. She is admitted to undergo therapies with PT/OT/SLP.     Admission to acute inpatient rehab: 9/5/22  Impairment group code: 01.1  Stroke Ischemic  (L) Body Involvement (R) Brain: small cluster of recent infarcts in the right frontal lobe and right basal ganglia.       # Acute ischemic stroke   # Recent infarcts in the right frontal lobe and right basal ganglia  Noted left-sided partial hemiparesis and mild dysarthria/expressive aphasia at presentation 8/30. MRI confirmed  Acute ischemic stroke, and TPA/ thrombectomy 2/2 unknown last known normal. CTA showed no large vessel occlusion, with only 30% of ICA occlusion. Echo was normal EF 65% on 8/31. No Afib noted with telemetry.  at baseline. BP goal is <140/90 at rest, anticipate increase with therapies.   - Plavix 75 mg and aspirin 81 mg until 11/30   -aspirin 325mg starting 12/1  -  Atorvastatin 80 mg    - PT/OT/SLP     # Dysphagia    Video swallow study on 9/2/22: \"Patient had direct, silent aspiration with mildly thick liquid on first trial and deep penetration with mildly thick liquids on subsequent trials. No aspiration or penetration with moderately thick liquids.   - Continue dysphagia diet with moderately thick fluids  - SLP  - repeat swallow study for 9/20     # Left Breast Ductal Carcinoma   FNA 9/2. There is a high suspicion for breast CA. Surgery rec'd neoadjuvent treatment with hopes of shrinking tumor prior to surgery. Unable to surgically operate due " to recent stroke  - Needs referral to oncology for outpatient follow-up.  --9/7 biopsy results: ductal carcinoma and estrogen and progesterone positive.     - [facility]  - [facility] consult: appreciate recs 9/6   -Letrozole 2.5 mg daily    #UTI  -urine from 9/19 showed WBCs, leukocyte esterase, mucus  -Pt reports abdominal pain, dysuria, similar to previous UTIs  -Macrobid BID for 5 days, end 9/24     # Hyperlipidemia    at baseline.  - Atorvastatin 80 mg      # Hypertension  Was allowed permissive HTN, first 24 hours but started to bring it down slowly. Slowly decrease BP, with goal to keep <140/90 at rest.  - Lisinopril 30 mg daily  - Amlodipine 10 mg Daily 9/15  - Hydralazine 10 mg PRN 9/6 for SBP over 170     # Neurogenic Bladder  - Timed voiding  - purewick HS     # Bowel   BM on 9/19  - PRN Senna and Miralax  - PRN bisacodyl suppository     # Sleep   - Melatonin 5 mg at bedtime  - Trazodone 25 mg at bedtime     # Asymptomatic COVID 19 Infection - Resolved  Positive Covid test on 8/3 with preceding cold symptoms for 3 days prior. Currently she continues remain asymptomatic from COVID symptoms on 9/5. She has no cough, wheezing, SOB, fever, chills, body aches or any type of cold symptoms.  She has completed remdesivir x 3 days.  Off of quarantine     #GILDA (obstructive sleep apnea):  Patient does not use CPAP at baseline. May need repeat sleep study and eval by sleep medicine team.   - encourage using CPAP         6. Adjustment to disability:  Clinical psychology to eval and treat, seen 9/13  7. FEN: Regular Diet Adult Liquidized/Moderately Thick (level 3)  8. Bowel: PRN Senna and Miralax  9. Bladder: Timed voiding  10. DVT Prophylaxis: Heparin 5000 units for DVT ppx. Plavix 75 mg daily and ASA 81 mg 90 days then ASA alone.   11. GI Prophylaxis: Pantoprazole 40 mg   12. Code: Full  13. Disposition: Home  14. ELOS:  9/30/2022  15. Rehab prognosis:  Fair  16. Follow up Appointments  on Discharge:   - PCP follow up 7-14 days post discharge  - Follow-up with neurology in 6 to 8 weeks  - Referral for oncology given biopsy results   - GILDA: outpatient referral to sleep medicine clinic if agreeable      Seen and discussed with Dr. Marcos, PM&R staff physician     Corinne Macias DO  PGY-2  Physical Medicine and Rehabilitation

## 2022-09-20 NOTE — PROGRESS NOTES
[facility] Liaison assisted with HC referral. [facility] HC accepted. Details about pt insurance coverage listed below. Met with pt and provided update on HC agency and insurance coverage. Pt in agreement and expressed understanding with the HC set up and potential OOP cost. Team rounds this morning, discharge date extended to next Fri 09/30/2022. [facility] updated. Pt denied any other SW needs, questions, or concerns at this time. Pt has not worked on HCD but reminded that SW can assist with notary if she chooses to complete before discharge. SW will fax orders and ppwk on day of discharge and update if discharge plans change.     Per [facility] Intake: [facility], Ok to accept. Member would be 100% responsible for services until satisfying deductible, $250 remaining. Once met, member would have a 5% coinsurance per visit. MSW not covered.    Pt does not have a regular PCP. HUC will assist and get pt set up with PCP at discharge. ARU MD to sign HC orders until PCP established and notified.     Home Health Care:   [facility]  PH: [phone], Fax: [phone]   RN, PT, OT, SLP, PAOLA Barkley, [facility]   [facility]   Direct Phone: [phone]  I   Pager: [phone]  I  Fax: [phone]

## 2022-09-20 NOTE — PLAN OF CARE
Discharge Planner Post-Acute Rehab SLP:     Discharge Plan:  SLP    Precautions: aspiration, fall    Current Status:  Communication: Word finding difficulties in conversation   Cognition: Mild-mod cognitive impairments with deficits re: memory, attention, reasoning, language. Benefits from extra time   Swallow: Regular, moderately thick (3) liquid. Upright fully all PO. Medications given as tolerated with 3 liquid or puree. VFSS scheduled for 9/20     Assessment:  Pt IND completing pharyngeal exercises and using exercise log to record reps appropriately.Pt educated re: plan for VFSS today, procedure, and goals. Pt expressed ?s related to outcome of VFSS and relation to dc timing. SLP addressed all concerns and answered ?s. Pt participated in task using visual to solve and answer functional math ?s. Pt able to implement strategies IND and sequence steps to solve problems. Required few instances mod cues to remind pt where she 'left off' and further aid organizing thought process. Ongoing improvement noted in reasoning and sequencing. However, continues to demonstrate impaired organization and processing time    Other Barriers to Discharge (Family Training, etc): family training education re: thickened liquids (pending improvement) and IADL support

## 2022-09-20 NOTE — PROGRESS NOTES
CLINICAL NUTRITION SERVICES - REASSESSMENT NOTE     Nutrition Prescription    RECOMMENDATIONS FOR MDs/PROVIDERS TO ORDER:  None today - pt reviewed face to face during team rounds     Malnutrition Status:    Patient does not meet two of the established criteria necessary for diagnosing malnutrition    Recommendations already ordered by Registered Dietitian (RD):  Change supplement to chocolate Magic Cup once daily at dinner meal     Future/Additional Recommendations:  Continue to monitor meal/supplement intakes, wt/lab trends      EVALUATION OF THE PROGRESS TOWARD GOALS   Diet: Regular, Moderately Thick (level 3) Liquids  Supplement: Ensure Enlive at lunch (prefers chocolate, if no chocolate, strawberry) - thickened per diet orders per SLP   Intake: Mostly % per flow sheets, only 1 meal noted at 25% since last RD assessment        NEW FINDINGS   MAR reviewed. Labs reviewed. Pt reviewed during team rounds and then RD visited later at bedside this afternoon after video swallow, noted it appears pt may be ready for diet advancement per SLP tomorrow, RD reviewed supplement, pt getting tired of Ensure, agreeing to changing supplement as above, reviewed weight trends, pt was unsure of recent usual weight, but notes clothes fitting the same. RD reviewed the importance of adequate intakes for continued work with therapy, encouraged fluids until pt's diet further advanced, pt verbalizes understanding.     09/19/22 0339 75.3 kg (166 lb) Standing scale   09/05/22 1107 77.7 kg (171 lb 3.2 oz) Bed scale     09/03/22 74.2 kg (163 lb 8 oz)     Weight assessment: Weight varying, but overall appear stable from not quite 1 month ago, no other history available.     MALNUTRITION  % Intake: Decreased intake does not meet criteria  % Weight Loss: Weight loss does not likely meet criteria  Subcutaneous Fat Loss: None observed  Muscle Loss: None observed  Fluid Accumulation/Edema: None noted  Malnutrition Diagnosis: Patient does not  meet two of the established criteria necessary for diagnosing malnutrition    Previous Goals   Patient to consume % of nutritionally adequate meal trays TID, or the equivalent with supplements/snacks  Evaluation: Improving    Previous Nutrition Diagnosis  Predicted inadequate nutrient intake (kcal, protein) related to menu fatigue as evidenced LOS and patient report  Evaluation: No change    CURRENT NUTRITION DIAGNOSIS  Predicted inadequate nutrient intake (kcal, protein) related to menu fatigue as evidenced LOS and patient report    INTERVENTIONS  Implementation  Medical food supplement therapy - adjusted as above per pt preference     Goals  Patient to consume % of nutritionally adequate meal trays TID, or the equivalent with supplements/snacks.    Monitoring/Evaluation  Progress toward goals will be monitored and evaluated per protocol.    Radha Mendez RD, CNSC, LD  ARU RD pager: [phone]

## 2022-09-20 NOTE — PROGRESS NOTES
"   09/20/22 1400   General Information   Onset of Illness/Injury or Date of Surgery 08/30/22   Referring Physician Estrada Kevin MD   Patient/Family Therapy Goal Statement (SLP) \"get my thinking better\"   Pertinent History of Current Problem Per H&P:\"67 year old left hand dominant female with PMHx Hypertension, Hyperlipidemia, vertigo, GILDA (not using CPAP). She presented to [facility] 8/30/22 with stroke symptoms of left-sided partial hemiparesis and mild dysarthria/expressive aphasia on morning of, onset was the day prior. TPA/ thrombectomy not done due to unknown last normal, and lack of large vessel occlusion only 30% stenosis of right ICA on CTA. Brain MRI showed ischemic stroke with a small cluster of recent infarcts in the right frontal lobe and right basal ganglia. She also tested positive for asymptomatic COVID infection on admission. Had a recent breast mass biopsy 9/2, with pathology results pending but very concerning for cancer, this was following a notable suspicious breast mass which has grown quickly and has begun to bleed and is causing skin compromise . She was a very poor candidate for surgery because of the size of the mass and because of her recent stroke.\" SLP consulted to assess cognition and dysphagia   General Observations Pt seen for repeat VFSS on this date   Past History of Dysphagia VFSS completed 9/2 indicating:\"Videofluoroscopic Swallow Study completed. Patient had direct, silent aspiration with mildly thick liquid on first trial and deep penetration with mildly thick liquids on subsequent trials. No aspiration or penetration with moderately thick liquids. Oral motor function was WFL overall with mild left asymmetry and oral phase was minimally impaired with premature spillage of mildly thick liquids. Tongue base retraction was complete. Swallow response was mild to moderately delayed with mildly thick liquids and mildly delayed with puree and moderately thick. Epiglottic " "inversion was complete consistently.  No significant stasis occurred with any intake. Hyolaryngeal elevation was inconsistent in movement, mildly delayed but largely intact and hyolaryngeal excursion was inconsistent with range from nearly absent to complete. Recommend diet of Regular textures and Moderately thick liquids. Repeat VFSS in 1-2 weeks or as improvement noted at bedside.\"   Pain Assessment   Patient Currently in Pain No   Type of Evaluation   Type of Evaluation Swallow Evaluation   General Swallowing Observations   Respiratory Support (General Swallowing Observations) none   Current Diet/Method of Nutritional Intake (General Swallowing Observations, NIS) regular diet;moderately thick liquids/liquidized (level 3)   Swallowing Evaluation Videofluoroscopic swallow study (VFSS)   Clinical Swallow Evaluation   Feeding Assistance no assistance needed   VFSS Evaluation   Radiologist Dr Clayton   Views Taken left lateral   Physical Location of Procedure [facility]   VFSS Textures Trialed thin liquids;mildly thick liquids;moderately thick liquids/liquidized;solid foods   VFSS Eval: Thin Liquid Texture Trial   Mode of Presentation, Thin Liquid cup;spoon;straw;self-fed   Order of Presentation 7-10, 12-13   Preparatory Phase WFL   Oral Phase, Thin Liquid WFL   Pharyngeal Phase, Thin Liquid Delayed swallow reflex   Rosenbek's Penetration Aspiration Scale: Thin Liquid Trial Results 2 - contrast enters airway, remains above the vocal cords, no residue remains (penetration)   Response to Aspiration productive volitional cough following clinician cue  (IND)   VFSS Eval: Mildly Thick Liquids   Mode of Presentation spoon;cup;self-fed   Order of Presentation 2-6   Preparatory Phase WFL   Oral Phase WFL   Pharyngeal Phase Delayed swallow reflex   Rosenbek's Penetration Aspiration Scale 2 - contrast enters airway, remains above the vocal cords, no residue remains (penetration)   VFSS Eval: Moderately Thick Liquids   Mode of " Presentation self-fed   Order of Presentation 1   Preparatory Phase WFL   Oral Phase WFL   Pharyngeal Phase WFL   Rosenbek's Penetration Aspiration Scale 1 - no aspiration, contrast does not enter airway   VFSS Evaluation: Solid Food Texture Trial   Mode of Presentation, Solid self-fed   Order of Presentation 11   Preparatory Phase WFL   Oral Phase, Solid Delayed AP movement;Residue in oral cavity   Pharyngeal Phase, Solid WFL   Rosenbek's Penetration Aspiration Scale: Solid Food Trial Results 1 - no aspiration, contrast does not enter airway   Esophageal Phase of Swallow   Patient reports or presents with symptoms of esophageal dysphagia No   Esophageal sweep performed during today s vidofluoroscopic exam  No   Swallowing Recommendations   Diet Consistency Recommendations regular diet;thin liquids (level 0)   Supervision Level for Intake patient independent   Mode of Delivery Recommendations bolus size, small;slow rate of intake   Swallowing Maneuver Recommendations alternate food and liquid intake   Monitoring/Assistance Required (Eating/Swallowing) monitor for cough or change in vocal quality with intake;stop eating activities when fatigue is present   Clinical Impression   Criteria for Skilled Therapeutic Interventions Met (SLP Eval) Yes, treatment indicated   SLP Diagnosis mild dysphagia   Risks & Benefits of therapy have been explained evaluation/treatment results reviewed;care plan/treatment goals reviewed;participants included;patient   Clinical Impression Comments SLP: Pt seen for repeat VFSS on this date. Pt presents with significant improved oropharyngeal swallow function and demonstrated no aspiration across all consistencies. Pt with few instances reduced laryngeal vestibule closure resulting in intermittent flash penetration with mildly thick (2) and large sips thin (0) liquid. Recommend pt continue regular texture, advance to level 0 liquid. straws ok. Ensure upright fully with all PO. Pt continues to  depict dry cough not directly following PO or related to PO. Likely lingering COVID impact. SLP to make diet advancement tomorrow if tolerance is consistent across meal and larger intake.    Total Evaluation Time   Total Evaluation Time (Minutes) 35  (vfss)   SLP Goals   Therapy Frequency (SLP Eval) daily   SLP Predicted Duration/Target Date for Goal Attainment 09/21/22

## 2022-09-20 NOTE — PLAN OF CARE
Skilled set up on :  Pt dependent for proper placement of electrodes on L quads, hamstrings, gastroc, and anterior tibialis for optimum muscle contraction, safe positioning on w/c within frame and cushion for prevention of skin breakdown, feet secured to foot pedals, w/c secured to  w/ Q-straints.  Passive motion assessed to ensure proper positioning.      Pt performed 353 minutes of active FES ergometry with 100% stimulation applied to above muscles at ~40rpm with 0.91nm resistance.  This PT adjusted e-stim and cycling parameters in real-time to ensure palpable muscle contractions throughout session.  Please see [email or web address] for further details on patient's stimulation parameters and ergometry outcomes.      Changes in parameters that were part of this treatment:   -amplitude (increased slightly in gastroc and anterior tib)    Functional outcomes from this intervention include:   -reduced spasticity  -improved sensory awareness and proprioception  -improved muscle strength  -improved motor coordination    Session Summary:   -Average Power: 3.3 W  -Asymmetry: Right 7%  -Active Minutes: 28:33

## 2022-09-20 NOTE — PROGRESS NOTES
Discharge Planner Post-Acute Rehab OT:     Discharge Plan: home with spouse A, HC OT vs OP OT pending progress    Precautions: falls, dense L jong, monitor BP, LUE omoneurexa OOB for LUE mgmt, breast cancer    Current Status:  ADLs:  Mobility: Ax1 SaraStedy. Squat/stand pivot min Ax1 to R side, mod A L, needs skilled set up. Recommend Ax1 SaraStedy with nsg. Ok to sit EOB independently - no alarms.  Grooming: SBA EOB wash face, Mod A wash hands. Set up/clean up oral cares.  Dressing: UB- Mod A shirt and LUE yoana neurexa. LB - Max A shorts rolling and reacher. Feet - Don socks SBA RLE, A to hold LLE in fig four.  Bathing: Max A sponge bathing. Transfer N/A d/t isolation precautions.   Toileting: Transfer SaraStedy <> commode overlay. Total A cares and clothing mgmt.   IADLs: IND PTA. Anticipate A at discharge.  Vision/Cognition: L strabismus baseline with pt reporting, pt reports worse vision in L eye at baseline. Functional deficits problem solving, memory, and wordfinding.    Assessment: Pt seen for interdisciplinary rounds, see POC note. Extended discharge date to Friday 9/30/22 and set up initial family training with skyler Huertas Thursday PM. Progressed pt to no alarms and ok to sit EOB ind.     Other Barriers to Discharge (DME, Family Training, etc): 1 stair to enter from garage, then all needs met on main level. Tub/ledge shower.     DME: Recommend ETB, OTC, reacher, yoana neurexa, temporary bed rail, gb in shower, gb next to toilets.     Family training - Thursday 9/22 PM with skyler Huertas. Anticipate additional training week of discharge.

## 2022-09-20 NOTE — PROGRESS NOTES
PSYCHOLOGY PROGRESS NOTE    Start time: 12:00 PM  Stop Time: 12:25 PM  Total Duration in Minutes: 25  Patient was seen virtually ([employer] cart or other videoconferencing)    SUBJECTIVE:  Stephani Garcia was seen today for a follow-up visit. Reviewed experiences since we last met and assessed mental health status.    Symptoms reported: Stephani reports feeling frustrated with hospitalization and dependence on staff for ADLs. She also reports feeling frustrated with her cognitive impairments in memory and language. Stephani reports improved mood from last visit on 9/13. She also states mood has improved with trazodone.    Progress toward goals: Utilizing distraction strategies and benefit finding.     Interventions utilized: Reviewed Stephani's experiences in past days. Supported her in processing emotions related to functional dependence. Reviewed coping strategies, such as distraction and taught CBT strategies to manage negative thoughts and emotions.     OBJECTIVE:   Stephani was lying in her hospital during our visit. She reported fair mood, with some frustration with functional dependence. Affect was mood congruent. Thought processes logical and linear. Insight and judgment good. Speech WNL.     ASSESSMENT:  Patient with PMH significant for acute ischemic infarcts with right frontal lobe and right basal ganglia involvement who would likely benefit from continued support with stress management in the context of prolonged hospitalization.    DIAGNOSIS:  Adjustment Disorder with mixed anxiety and depressed mood.    PLAN: Plan for psychology to follow this patient approximately once per week for the duration of their rehabilitation stay.     Soheila Torres, PhD   and Licensed Psychologist

## 2022-09-21 ENCOUNTER — APPOINTMENT (OUTPATIENT)
Dept: PHYSICAL THERAPY | Facility: CLINIC | Age: 67
DRG: 056 | End: 2022-09-21
Attending: PHYSICAL MEDICINE & REHABILITATION
Payer: COMMERCIAL

## 2022-09-21 ENCOUNTER — APPOINTMENT (OUTPATIENT)
Dept: OCCUPATIONAL THERAPY | Facility: CLINIC | Age: 67
DRG: 056 | End: 2022-09-21
Attending: PHYSICAL MEDICINE & REHABILITATION
Payer: COMMERCIAL

## 2022-09-21 ENCOUNTER — APPOINTMENT (OUTPATIENT)
Dept: SPEECH THERAPY | Facility: CLINIC | Age: 67
DRG: 056 | End: 2022-09-21
Attending: PHYSICAL MEDICINE & REHABILITATION
Payer: COMMERCIAL

## 2022-09-21 LAB — LACTATE SERPL-SCNC: 1.4 MMOL/L (ref 0.7–2)

## 2022-09-21 PROCEDURE — 92507 TX SP LANG VOICE COMM INDIV: CPT | Mod: GN

## 2022-09-21 PROCEDURE — 83605 ASSAY OF LACTIC ACID: CPT | Performed by: STUDENT IN AN ORGANIZED HEALTH CARE EDUCATION/TRAINING PROGRAM

## 2022-09-21 PROCEDURE — 97112 NEUROMUSCULAR REEDUCATION: CPT | Mod: GO | Performed by: OCCUPATIONAL THERAPIST

## 2022-09-21 PROCEDURE — 97542 WHEELCHAIR MNGMENT TRAINING: CPT | Mod: GP | Performed by: PHYSICAL THERAPIST

## 2022-09-21 PROCEDURE — 250N000013 HC RX MED GY IP 250 OP 250 PS 637: Performed by: STUDENT IN AN ORGANIZED HEALTH CARE EDUCATION/TRAINING PROGRAM

## 2022-09-21 PROCEDURE — 97530 THERAPEUTIC ACTIVITIES: CPT | Mod: GP | Performed by: PHYSICAL THERAPIST

## 2022-09-21 PROCEDURE — 128N000003 HC R&B REHAB

## 2022-09-21 PROCEDURE — 250N000013 HC RX MED GY IP 250 OP 250 PS 637: Performed by: PHYSICAL MEDICINE & REHABILITATION

## 2022-09-21 PROCEDURE — 36415 COLL VENOUS BLD VENIPUNCTURE: CPT | Performed by: STUDENT IN AN ORGANIZED HEALTH CARE EDUCATION/TRAINING PROGRAM

## 2022-09-21 PROCEDURE — 250N000013 HC RX MED GY IP 250 OP 250 PS 637: Performed by: INTERNAL MEDICINE

## 2022-09-21 PROCEDURE — 97535 SELF CARE MNGMENT TRAINING: CPT | Mod: GO | Performed by: OCCUPATIONAL THERAPIST

## 2022-09-21 PROCEDURE — 99232 SBSQ HOSP IP/OBS MODERATE 35: CPT | Performed by: PHYSICAL MEDICINE & REHABILITATION

## 2022-09-21 PROCEDURE — 250N000011 HC RX IP 250 OP 636

## 2022-09-21 PROCEDURE — 250N000013 HC RX MED GY IP 250 OP 250 PS 637

## 2022-09-21 PROCEDURE — 92526 ORAL FUNCTION THERAPY: CPT | Mod: GN | Performed by: SPEECH-LANGUAGE PATHOLOGIST

## 2022-09-21 RX ADMIN — ASPIRIN 81 MG: 81 TABLET, COATED ORAL at 08:52

## 2022-09-21 RX ADMIN — Medication 25 MG: at 20:25

## 2022-09-21 RX ADMIN — LETROZOLE 2.5 MG: 2.5 TABLET, FILM COATED ORAL at 20:25

## 2022-09-21 RX ADMIN — HEPARIN SODIUM 5000 UNITS: 5000 INJECTION, SOLUTION INTRAVENOUS; SUBCUTANEOUS at 20:25

## 2022-09-21 RX ADMIN — NITROFURANTOIN (MONOHYDRATE/MACROCRYSTALS) 100 MG: 75; 25 CAPSULE ORAL at 17:40

## 2022-09-21 RX ADMIN — CLOPIDOGREL BISULFATE 75 MG: 75 TABLET, FILM COATED ORAL at 08:52

## 2022-09-21 RX ADMIN — LISINOPRIL 30 MG: 20 TABLET ORAL at 20:25

## 2022-09-21 RX ADMIN — ATORVASTATIN CALCIUM 80 MG: 80 TABLET, FILM COATED ORAL at 20:25

## 2022-09-21 RX ADMIN — NITROFURANTOIN (MONOHYDRATE/MACROCRYSTALS) 100 MG: 75; 25 CAPSULE ORAL at 05:07

## 2022-09-21 RX ADMIN — AMLODIPINE BESYLATE 10 MG: 10 TABLET ORAL at 08:52

## 2022-09-21 RX ADMIN — PANTOPRAZOLE SODIUM 40 MG: 40 TABLET, DELAYED RELEASE ORAL at 08:52

## 2022-09-21 RX ADMIN — Medication 5 MG: at 20:25

## 2022-09-21 RX ADMIN — HEPARIN SODIUM 5000 UNITS: 5000 INJECTION, SOLUTION INTRAVENOUS; SUBCUTANEOUS at 08:55

## 2022-09-21 ASSESSMENT — ACTIVITIES OF DAILY LIVING (ADL)
ADLS_ACUITY_SCORE: 48

## 2022-09-21 NOTE — PLAN OF CARE
Discharge Planner Post-Acute Rehab SLP:     Discharge Plan:  SLP    Precautions: aspiration, fall    Current Status:  Communication: Word finding difficulties in conversation   Cognition: Mild-mod cognitive impairments with deficits re: memory, attention, reasoning, language. Benefits from extra time   Swallow: Regular, moderately thick (3) liquid. Upright fully all PO. Medications given as tolerated with 3 liquid or puree.      Assessment: Pt completed reasoning and working memory tasks with 80% accuracy. She was able to provide appropriate verbal reasoning, she did lack attention to errors but was able to correct errors when prompted.     Other Barriers to Discharge (Family Training, etc): family training education re: thickened liquids (pending improvement) and IADL support

## 2022-09-21 NOTE — PLAN OF CARE
FOCUS/GOAL  Bowel management, Bladder management, Pain management and Cognition/Memory/Judgment/Problem solving    ASSESSMENT, INTERVENTIONS AND CONTINUING PLAN FOR GOAL:  Pt is alert and oriented x4, slightly dysarthric with speech, left side weakness. Denies fever, chills, chest pain, SOB, N/V, abdominal pain, or new weakness/numbness/tingling, continent of bowel and bladder, transferring with assist of 1 and ubaldo magdaleno, sleeping well throughout the night, no tubes, lines or drains, vs stable, dressing CDI to left breast, no further care concerns at this time continue with POC.         Goal Outcome Evaluation: No change

## 2022-09-21 NOTE — PROGRESS NOTES
"  [facility]   Acute Rehabilitation Unit    INTERVAL HISTORY  Stephani reports feeling better this morning.  No acute events overnight.  Denies chest pain, shortness of breath, no fever or chills.  Does report some dizziness, but states not used to BP being so low.  Will monitor and considering lowering dose of Norvasc.  Also had vfss yesterday and will monitor parag sanon SLP for diet advancement.     Functional  SLP  Pt presents with significant improved oropharyngeal swallow function and demonstrated no aspiration across all consistencies. Pt with few instances reduced laryngeal vestibule closure resulting in intermittent flash penetration with mildly thick (2) and large sips thin (0) liquid. Recommend pt continue regular texture, advance to level 0 liquid. straws ok. Ensure upright fully with all PO.      ROS: 10 point ROS neg other than the symptoms noted above in the HPI.            MEDICATIONS  Scheduled meds    amLODIPine  10 mg Oral Daily     aspirin  81 mg Oral Daily     atorvastatin  80 mg Oral QPM     clopidogrel  75 mg Oral Daily     heparin ANTICOAGULANT  5,000 Units Subcutaneous Q12H     letrozole  2.5 mg Oral At Bedtime     lisinopril  30 mg Oral At Bedtime     melatonin  5 mg Oral At Bedtime     nitroFURantoin macrocrystal-monohydrate  100 mg Oral Q12H NEIL (08/20)     pantoprazole  40 mg Oral QAM AC     traZODone  25 mg Oral At Bedtime       PRN meds:  acetaminophen, bisacodyl, hydrALAZINE, polyethylene glycol, senna-docusate      PHYSICAL EXAM  /71 (BP Location: Right arm)   Pulse 98   Temp (!) 95.6  F (35.3  C) (Oral)   Resp 16   Ht 1.702 m (5' 7\")   Wt 75.3 kg (166 lb)   SpO2 97%   BMI 26.00 kg/m    Gen: awake, alert  HEENT: EOMI, L eye deviation from midline, per baseline  Cardio: well perfused extremities  Pulm: on room air, no increased work of breathing  Abd: soft, nontender, nondistended  Ext: moves RLE and RUE freely, improved motion in LLE, " LUE without volitional movement  Neuro: sensation intact to soft touch at BUE  MSK: strength at least 3/5 in bilateral HF    LABS  Results for orders placed or performed during the hospital encounter of 09/05/22 (from the past 24 hour(s))   XR Video Swallow with SLP or OT    Narrative    Examination:  Modified Barium Swallow Study with Speech Pathology,  9/20/2022    Comparison: 9/2/2022    History: Dysphagia, stroke    Findings: Under fluoroscopic guidance, the patient was given orally  administered barium of varying consistencies in the presence of the  speech pathology service.  The oral phase was normal. Only small  amount of residual contrast within the pharynx. One episode of  aspiration with mildly thickened barium which elicited a strong cough  response. Occasional flash penetration with other consistencies of  barium. No aspiration with thin, pudding consistency or barium coated  cracker.      Impression    Impression:   1. One episode of aspiration with mildly thick barium .  2. Occasional flash penetration with other consistencies barium. No  aspiration with thin consistency or pudding/cracker consistency  barium.    Please see the speech pathologist report for further details regarding  the modified barium swallow study portion of the examination.      NATHANAEL RUDOLPH,          SYSTEM ID:  [id number]       ASSESSMENT AND PLAN  Stephani Garcia is a 67 year old left hand dominant female with PMHx HTN, HLD, vertigo, GILDA (not using CPAP). Admitted for acute ischemic infarcts with right frontal lobe and right basal ganglia involvement. Has functional deficits of weak L. shoulder shrug, L. Hemiparesis, dysphagia, dysarthria, possbile neurogenic bowel/bladder and cognitive deficits. She also has possible L. Breast CA pending pathology results. She is admitted to undergo therapies with PT/OT/SLP.     Admission to acute inpatient rehab: 9/5/22  Impairment group code: 01.1  Stroke Ischemic  (L) Body Involvement  "(R) Brain: small cluster of recent infarcts in the right frontal lobe and right basal ganglia.       # Acute ischemic stroke   # Recent infarcts in the right frontal lobe and right basal ganglia  Noted left-sided partial hemiparesis and mild dysarthria/expressive aphasia at presentation 8/30. MRI confirmed  Acute ischemic stroke, and TPA/ thrombectomy 2/2 unknown last known normal. CTA showed no large vessel occlusion, with only 30% of ICA occlusion. Echo was normal EF 65% on 8/31. No Afib noted with telemetry.  at baseline. BP goal is <140/90 at rest, anticipate increase with therapies.   - Plavix 75 mg and aspirin 81 mg until 11/30   -aspirin 325mg starting 12/1  -  Atorvastatin 80 mg    - PT/OT/SLP     # Dysphagia    Video swallow study on 9/2/22: \"Patient had direct, silent aspiration with mildly thick liquid on first trial and deep penetration with mildly thick liquids on subsequent trials. No aspiration or penetration with moderately thick liquids.   - Continue dysphagia diet with moderately thick fluids  - SLP  - repeat swallow study for 9/20 - Pt presents with significant improved oropharyngeal swallow function and demonstrated no aspiration across all consistencies. Pt with few instances reduced laryngeal vestibule closure resulting in intermittent flash penetration with mildly thick (2) and large sips thin (0) liquid. Recommend pt continue regular texture, advance to level 0 liquid. straws ok. Ensure upright fully with all PO.     # Left Breast Ductal Carcinoma   FNA 9/2. There is a high suspicion for breast CA. Surgery rec'd neoadjuvent treatment with hopes of shrinking tumor prior to surgery. Unable to surgically operate due to recent stroke  - Needs referral to oncology for outpatient follow-up.  --9/7 biopsy results: ductal carcinoma and estrogen and progesterone positive.     - [facility]  - WO consult: appreciate recs 9/6   -Letrozole 2.5 mg daily    #UTI  -urine " from 9/19 showed WBCs, leukocyte esterase, mucus  -Pt reports abdominal pain, dysuria, similar to previous UTIs  -Macrobid BID for 5 days, end 9/24     # Hyperlipidemia    at baseline.  - Atorvastatin 80 mg      # Hypertension  Was allowed permissive HTN, first 24 hours but started to bring it down slowly. Slowly decrease BP, with goal to keep <140/90 at rest.  - Lisinopril 30 mg daily  - Amlodipine 10 mg Daily 9/15  - Hydralazine 10 mg PRN 9/6 for SBP over 170  --some dizziness reported, will monitor BP and consider lowering dose given improvement in daily BP.       # Neurogenic Bladder  - Timed voiding  - purewick HS     # Bowel   BM on 9/19  - PRN Senna and Miralax  - PRN bisacodyl suppository     # Sleep   - Melatonin 5 mg at bedtime  - Trazodone 25 mg at bedtime     # Asymptomatic COVID 19 Infection - Resolved  Positive Covid test on 8/3 with preceding cold symptoms for 3 days prior. Currently she continues remain asymptomatic from COVID symptoms on 9/5. She has no cough, wheezing, SOB, fever, chills, body aches or any type of cold symptoms.  She has completed remdesivir x 3 days.  Off of quarantine     #GILDA (obstructive sleep apnea):  Patient does not use CPAP at baseline. May need repeat sleep study and eval by sleep medicine team.   - encourage using CPAP         6. Adjustment to disability:  Clinical psychology to eval and treat, seen 9/13  7. FEN: Regular Diet Adult Liquidized/Moderately Thick (level 3)  8. Bowel: PRN Senna and Miralax  9. Bladder: Timed voiding  10. DVT Prophylaxis: Heparin 5000 units for DVT ppx. Plavix 75 mg daily and ASA 81 mg 90 days then ASA alone.   11. GI Prophylaxis: Pantoprazole 40 mg   12. Code: Full  13. Disposition: Home  14. ELOS:  9/30/2022  15. Rehab prognosis:  Fair  16. Follow up Appointments on Discharge:   - PCP follow up 7-14 days post discharge  - Follow-up with neurology in 6 to 8 weeks  - Referral for oncology given biopsy results   - GILDA: outpatient referral  to sleep medicine clinic if agreeable      Andrew Marcos, DO  Physical Medicine and Rehabilitation        I spent a total of 25 minutes face to face and coordinating care of Stephani Garcia. Over 50% of my time on the unit was spent counseling the patient and /or coordinating care regarding post CVA.

## 2022-09-21 NOTE — PROGRESS NOTES
Discharge Planner Post-Acute Rehab OT:      Discharge Plan: home with spouse A, HC OT vs OP OT pending progress     Precautions: falls, dense L jong, monitor BP, LUE omoneurexa OOB for LUE mgmt, breast cancer     Current Status:  ADLs:    Mobility: Ax1 SaraStedy. Squat/stand pivot min Ax1 to R side, mod A L, needs skilled set up. Recommend Ax1 SaraStedy with nsg. Ok to sit EOB independently - no alarms.    Grooming: SBA EOB wash face, Mod A wash hands. Set up/clean up oral cares.    Dressing: UB- Mod A shirt and LUE yoana neurexa. LB - Max A shorts rolling and reacher. Feet - Don socks SBA RLE, A to hold LLE in fig four.    Bathing: Max A sponge bathing. Transfer N/A d/t isolation precautions.     Toileting: Transfer SaraStedy <> commode overlay. Total A cares and clothing mgmt.   IADLs: IND PTA. Anticipate A at discharge.  Vision/Cognition: L strabismus baseline with pt reporting, pt reports worse vision in L eye at baseline. Functional deficits problem solving, memory, and wordfinding.     Assessment: Completed FES for LUE neuro re-education and sensorimotor skills; refer to details below.      Other Barriers to Discharge (DME, Family Training, etc): 1 stair to enter from garage, then all needs met on main level. Tub/ledge shower.      DME: Recommend ETB, OTC, reacher, yoana neurexa, temporary bed rail, gb in shower, gb next to toilets.      Family training - Thursday 9/22 PM with son Aleksandar. Anticipate additional training week of discharge.    Skilled set up on :  Pt dependent for proper placement of electrodes on LUE for optimum muscle contraction, safe positioning on w/c within frame and cushion for prevention of skin breakdown, feet secured to foot pedals, w/c secured to  w/ Q-straints.  Passive motion assessed to ensure proper positioning.       Pt performed 30 minutes of active FES ergometry with 0-100% stimulation applied to above muscles at 30 rpm with .5nm resistance.  This OT adjusted e-stim and  cycling parameters in real-time to ensure palpable muscle contractions throughout session.  Please see [email or web address] for further details on patient's stimulation parameters and ergometry outcomes.       Changes in parameters that were part of this treatment:   -resistance  -pulse width, frequency  -amplitude  -pedal speed     Functional outcomes from this intervention include:   -reduced spasticity  -improved sensory awareness and proprioception  -improved muscle strength  -improved motor coordination

## 2022-09-21 NOTE — PLAN OF CARE
FOCUS/GOAL  Bowel management, Bladder management, Pain management, Mobility, Skin integrity, and Safety management    ASSESSMENT, INTERVENTIONS AND CONTINUING PLAN FOR GOAL:  Patient is alert and oriented x 4. Denied pain, headache, dizziness, CP or SOB. A-1 ubaldo steady. Regular/ moderately thick liquid. Continent of B/B last BM 09/20. Left breast wound dressing changed. VS WDL No care concern at this time. Call light is in reach alarm is on. Staff will continue to monitor.   Goal Outcome Evaluation:

## 2022-09-21 NOTE — PLAN OF CARE
Discharge Planner Post-Acute Rehab SLP:     Discharge Plan:  SLP    Precautions: aspiration, fall    Current Status:  Communication: Word finding difficulties in conversation   Cognition: Mild-mod cognitive impairments with deficits re: memory, attention, reasoning, language. Benefits from extra time   Swallow: Regular/thin (0) liquid. Fully upright, straws okay, small single bites and sips.    Assessment: Pt educated in VFSS findings, recommendations. Analyzed pt swallow function with regular noon meal (pizza) with thin water an cranberry juice. Pt demo'd taking small, single sips via cup edge every few bites of pizza. 1x reflexive throat clear post-swallow thin sip, no other s/sx of possible airway penetration/aspiration. Pt educated in recommendation to change to thin liquids, pt agreeable. Order entered, whiteboard updated, rehab RN notified.    Other Barriers to Discharge (Family Training, etc): family training education  and IADL support

## 2022-09-21 NOTE — PROGRESS NOTES
S: patient seen today at  for evaluation and casting for left rigid AFO  as ordered by     O/G: (prevent foot drop)(assist in ambulation)    A: Patient was casted for a left Rigid AFO.  The casting procedure was done non-weightbearing with the patient in a sitting position. The patient was prepped for the procedure by applying a cotton stockinette.  Reference marks were made at the malleoli, first and fifth metatarsal heads, head and neck of fibula and other prominences.  Cast material rolled circumferentially around patient's leg.  Care was taken to properly position the lower extremity to achieve the desired results. An incision was made down the anterior aspect of the cast and cast removed from patient.  The negative model will be sent to our lab for fabrication and the patient will be scheduled for a fitting once the braces are completed.     P: patient instructed to contact this clinic if any issues arise.  FIOR Vuong.

## 2022-09-21 NOTE — PLAN OF CARE
"VS:     /71 (BP Location: Right arm)   Pulse 98   Temp (!) 95.6  F (35.3  C) (Oral)   Resp 16   Ht 1.702 m (5' 7\")   Wt 75.3 kg (166 lb)   SpO2 97%   BMI 26.00 kg/m      Pt A/O X 4. Afebrile. VSS. Lungs- Clear bilaterally with both       anterior and posterior. IS encouraged. Denies nausea, shortness of breath, and chest pain.     Output:     Continent of bowel and bladder     Activity:       Pt up w/ assist of 1 and ubaldo steady     Skin: Left breast ulcer     Pain:     Denies pain     CMS:       CMS and Neuro's are intact. \Has Tingling in left arm      Dressing:     Left breast ulcer dressing CDI, changed and cleaned today     Diet:       Pt is on a reg diet and appetite was adequate this shift.    Diet has been changed to thin liquids.     LDA:     No lines or drains     Equipment:     Wheelchair, Belongings at bedside     Plan:       Pt is able to make needs known and the call light is within the pt's reach. Continue to monitor.                "

## 2022-09-22 ENCOUNTER — APPOINTMENT (OUTPATIENT)
Dept: SPEECH THERAPY | Facility: CLINIC | Age: 67
DRG: 056 | End: 2022-09-22
Attending: PHYSICAL MEDICINE & REHABILITATION
Payer: COMMERCIAL

## 2022-09-22 ENCOUNTER — APPOINTMENT (OUTPATIENT)
Dept: PHYSICAL THERAPY | Facility: CLINIC | Age: 67
DRG: 056 | End: 2022-09-22
Attending: PHYSICAL MEDICINE & REHABILITATION
Payer: COMMERCIAL

## 2022-09-22 ENCOUNTER — APPOINTMENT (OUTPATIENT)
Dept: EDUCATION SERVICES | Facility: CLINIC | Age: 67
DRG: 056 | End: 2022-09-22
Attending: PHYSICAL MEDICINE & REHABILITATION
Payer: COMMERCIAL

## 2022-09-22 ENCOUNTER — APPOINTMENT (OUTPATIENT)
Dept: OCCUPATIONAL THERAPY | Facility: CLINIC | Age: 67
DRG: 056 | End: 2022-09-22
Attending: PHYSICAL MEDICINE & REHABILITATION
Payer: COMMERCIAL

## 2022-09-22 PROCEDURE — 97129 THER IVNTJ 1ST 15 MIN: CPT | Mod: GN

## 2022-09-22 PROCEDURE — 250N000013 HC RX MED GY IP 250 OP 250 PS 637: Performed by: INTERNAL MEDICINE

## 2022-09-22 PROCEDURE — 128N000003 HC R&B REHAB

## 2022-09-22 PROCEDURE — 92526 ORAL FUNCTION THERAPY: CPT | Mod: GN

## 2022-09-22 PROCEDURE — 97130 THER IVNTJ EA ADDL 15 MIN: CPT | Mod: GN

## 2022-09-22 PROCEDURE — 250N000013 HC RX MED GY IP 250 OP 250 PS 637: Performed by: PHYSICAL MEDICINE & REHABILITATION

## 2022-09-22 PROCEDURE — 250N000013 HC RX MED GY IP 250 OP 250 PS 637

## 2022-09-22 PROCEDURE — 99232 SBSQ HOSP IP/OBS MODERATE 35: CPT | Mod: GC | Performed by: PHYSICAL MEDICINE & REHABILITATION

## 2022-09-22 PROCEDURE — 97535 SELF CARE MNGMENT TRAINING: CPT | Mod: GO

## 2022-09-22 PROCEDURE — L1960 AFO POS SOLID ANK PLASTIC MO: HCPCS

## 2022-09-22 PROCEDURE — 250N000013 HC RX MED GY IP 250 OP 250 PS 637: Performed by: STUDENT IN AN ORGANIZED HEALTH CARE EDUCATION/TRAINING PROGRAM

## 2022-09-22 PROCEDURE — 250N000011 HC RX IP 250 OP 636

## 2022-09-22 PROCEDURE — 97530 THERAPEUTIC ACTIVITIES: CPT | Mod: GP | Performed by: PHYSICAL THERAPIST

## 2022-09-22 RX ORDER — POLYETHYLENE GLYCOL 3350 17 G/17G
17 POWDER, FOR SOLUTION ORAL DAILY
Status: DISCONTINUED | OUTPATIENT
Start: 2022-09-23 | End: 2022-09-27

## 2022-09-22 RX ADMIN — Medication 5 MG: at 20:36

## 2022-09-22 RX ADMIN — LETROZOLE 2.5 MG: 2.5 TABLET, FILM COATED ORAL at 20:36

## 2022-09-22 RX ADMIN — ATORVASTATIN CALCIUM 80 MG: 80 TABLET, FILM COATED ORAL at 20:36

## 2022-09-22 RX ADMIN — CLOPIDOGREL BISULFATE 75 MG: 75 TABLET, FILM COATED ORAL at 09:16

## 2022-09-22 RX ADMIN — NITROFURANTOIN (MONOHYDRATE/MACROCRYSTALS) 100 MG: 75; 25 CAPSULE ORAL at 17:44

## 2022-09-22 RX ADMIN — AMLODIPINE BESYLATE 10 MG: 10 TABLET ORAL at 09:16

## 2022-09-22 RX ADMIN — SENNOSIDES AND DOCUSATE SODIUM 1 TABLET: 8.6; 5 TABLET ORAL at 20:36

## 2022-09-22 RX ADMIN — Medication 25 MG: at 20:36

## 2022-09-22 RX ADMIN — HEPARIN SODIUM 5000 UNITS: 5000 INJECTION, SOLUTION INTRAVENOUS; SUBCUTANEOUS at 20:36

## 2022-09-22 RX ADMIN — NITROFURANTOIN (MONOHYDRATE/MACROCRYSTALS) 100 MG: 75; 25 CAPSULE ORAL at 05:34

## 2022-09-22 RX ADMIN — HEPARIN SODIUM 5000 UNITS: 5000 INJECTION, SOLUTION INTRAVENOUS; SUBCUTANEOUS at 09:16

## 2022-09-22 RX ADMIN — LISINOPRIL 30 MG: 20 TABLET ORAL at 20:36

## 2022-09-22 RX ADMIN — ASPIRIN 81 MG: 81 TABLET, COATED ORAL at 09:16

## 2022-09-22 RX ADMIN — PANTOPRAZOLE SODIUM 40 MG: 40 TABLET, DELAYED RELEASE ORAL at 09:16

## 2022-09-22 RX ADMIN — POLYETHYLENE GLYCOL 3350 17 G: 17 POWDER, FOR SOLUTION ORAL at 10:18

## 2022-09-22 ASSESSMENT — ACTIVITIES OF DAILY LIVING (ADL)
ADLS_ACUITY_SCORE: 48

## 2022-09-22 NOTE — PLAN OF CARE
Patient is Aox4. Able to make needs known. Left sided weakness of upper and lower extremity. A1 with ubaldo steady. Continent of bowel and bladder; uses toilet in bathroom. Some word finding difficulty present. Shower completed tonight by NST. Dressing change complete on left breast tumor. Denies pain. Able to reposition self in bed. Continue plan of care.

## 2022-09-22 NOTE — PROGRESS NOTES
S: Patient seen today at  rehab for custom Left rigid AFO.     O/G: (prevent drop foot)(assist in ambulation)    A: patient seen today for fitting of custom Left AFO. Patient has been instructed on proper donning/doffing, use, care and break-in period.  Patient not observed in a walking trial however  both the patient and I are satisfied with the fit and function of the AFO at this time.     P: patient has been instructed to contact us if any issues arise.   FIOR Vuong.

## 2022-09-22 NOTE — PROGRESS NOTES
"  [facility]   Acute Rehabilitation Unit    INTERVAL HISTORY  Stephani is a 66 yo F who reports sleeping \"okay\" last night. She feels like she needs to have a BM but has not had one since 9/19. She also notes a small amount of dysuria which occurs with her first void in the morning.     Nursing notes reviewed, no acute events overnight.   Continent of bladder, Continent of bowel, Last BM 9/19. Denies chest pain abdominal pain, shortness of breath. No further concerns.    Functionally    With PT: worked on lateral transfers to and from the bed and wheelchair, requiring min to mod assist to her L side. Currently assist x1 with ubaldo steady during transfers    With OT: completed FES for LUE. With transfers, assist x1 with ubaldo steady, but able to sit at edge of bed independently without alarms.    With SLP: had swallow study completed, Stephani demonstrated that she is safe with thin liquids        MEDICATIONS  Scheduled meds    amLODIPine  10 mg Oral Daily     aspirin  81 mg Oral Daily     atorvastatin  80 mg Oral QPM     clopidogrel  75 mg Oral Daily     heparin ANTICOAGULANT  5,000 Units Subcutaneous Q12H     letrozole  2.5 mg Oral At Bedtime     lisinopril  30 mg Oral At Bedtime     melatonin  5 mg Oral At Bedtime     nitroFURantoin macrocrystal-monohydrate  100 mg Oral Q12H NEIL (08/20)     pantoprazole  40 mg Oral QAM AC     traZODone  25 mg Oral At Bedtime       PRN meds:  acetaminophen, bisacodyl, hydrALAZINE, polyethylene glycol, senna-docusate      PHYSICAL EXAM  /70 (BP Location: Right arm, Patient Position: Semi-West's)   Pulse 98   Temp (!) 96.3  F (35.7  C) (Oral)   Resp 15   Ht 1.702 m (5' 7\")   Wt 75.3 kg (166 lb)   SpO2 98%   BMI 26.00 kg/m    Gen: awake, alert  HEENT: EOMI  Cardio: RRR, no murmur  Pulm: on room air, lungs CTA bilaterally  Abd: soft, nontender, nondistended  Ext: moves RUE, RLE freely, more movement present in LUE and LLE than " "yesterday  Neuro: sensation intact to soft touch at BLE  MSK: strength 4/5 in L HF, 5/5 in R HF    LABS  Results for orders placed or performed during the hospital encounter of 09/05/22 (from the past 24 hour(s))   Lactic Acid STAT   Result Value Ref Range    Lactic Acid 1.4 0.7 - 2.0 mmol/L       ASSESSMENT AND PLAN    Stephani Garcia is a 67 year old left hand dominant female with PMHx HTN, HLD, vertigo, GILAD (not using CPAP). Admitted for acute ischemic infarcts with right frontal lobe and right basal ganglia involvement. Has functional deficits of weak L. shoulder shrug, L. Hemiparesis, dysphagia, dysarthria, possbile neurogenic bowel/bladder and cognitive deficits. She also has possible L. Breast CA pending pathology results. She is admitted to undergo therapies with PT/OT/SLP.     Admission to acute inpatient rehab: 9/5/22  Impairment group code: 01.1  Stroke Ischemic  (L) Body Involvement (R) Brain: small cluster of recent infarcts in the right frontal lobe and right basal ganglia.       # Acute ischemic stroke   # Recent infarcts in the right frontal lobe and right basal ganglia  Noted left-sided partial hemiparesis and mild dysarthria/expressive aphasia at presentation 8/30. MRI confirmed  Acute ischemic stroke, and TPA/ thrombectomy 2/2 unknown last known normal. CTA showed no large vessel occlusion, with only 30% of ICA occlusion. Echo was normal EF 65% on 8/31. No Afib noted with telemetry.  at baseline. BP goal is <140/90 at rest, anticipate increase with therapies.   - Plavix 75 mg and aspirin 81 mg until 11/30   -aspirin 325mg starting 12/1  -  Atorvastatin 80 mg    - PT/OT/SLP     # Dysphagia    Video swallow study on 9/2/22: \"Patient had direct, silent aspiration with mildly thick liquid on first trial and deep penetration with mildly thick liquids on subsequent trials. No aspiration or penetration with moderately thick liquids.   - Continue dysphagia diet with moderately thick fluids  - " SLP  - repeat swallow study for 9/20 - Pt presents with significant improved oropharyngeal swallow function and demonstrated no aspiration across all consistencies. Pt with few instances reduced laryngeal vestibule closure resulting in intermittent flash penetration with mildly thick (2) and large sips thin (0) liquid. Recommend pt continue regular texture, advance to level 0 liquid. straws ok. Ensure upright fully with all PO.     # Left Breast Ductal Carcinoma   FNA 9/2. There is a high suspicion for breast CA. Surgery rec'd neoadjuvent treatment with hopes of shrinking tumor prior to surgery. Unable to surgically operate due to recent stroke  - Needs referral to oncology for outpatient follow-up.  --9/7 biopsy results: ductal carcinoma and estrogen and progesterone positive.     - Follow [facility] per [facility]  - WOC consult: appreciate recs 9/6   -Letrozole 2.5 mg daily     #UTI  -urine from 9/19 showed WBCs, leukocyte esterase, mucus  -Pt reports abdominal pain, dysuria, similar to previous UTIs  -Macrobid BID for 5 days, end 9/24     # Hyperlipidemia    at baseline.  - Atorvastatin 80 mg      # Hypertension   Goal BP <140/90 at rest.  - Lisinopril 30 mg daily  - Amlodipine 10 mg Daily 9/15  - Hydralazine 10 mg PRN for SBP over 170     # Neurogenic Bladder  - Timed voiding  - purewick HS     # Bowel   -Miralax daily  - PRN Senna  - PRN bisacodyl suppository     # Sleep   - Melatonin 5 mg at bedtime  - Trazodone 25 mg at bedtime     # Asymptomatic COVID 19 Infection - Resolved  Positive Covid test on 8/3 with preceding cold symptoms for 3 days prior. Currently she continues remain asymptomatic from COVID symptoms on 9/5. She has no cough, wheezing, SOB, fever, chills, body aches or any type of cold symptoms.  She has completed remdesivir x 3 days.  Off of quarantine     #GILDA (obstructive sleep apnea):  Patient does not use CPAP at baseline. May need repeat sleep study and eval by sleep medicine  team.   - encourage using CPAP         6. Adjustment to disability:  Clinical psychology to eval and treat, seen 9/13  7. FEN: Regular Diet Adult Liquidized/Moderately Thick (level 3)  8. Bowel: PRN Senna and Miralax  9. Bladder: Timed voiding  10. DVT Prophylaxis: Heparin 5000 units for DVT ppx. Plavix 75 mg daily and ASA 81 mg 90 days then ASA alone.   11. GI Prophylaxis: Pantoprazole 40 mg   12. Code: Full  13. Disposition: Home  14. ELOS:  9/30/2022  15. Rehab prognosis:  Fair  16. Follow up Appointments on Discharge:   - PCP follow up 7-14 days post discharge - needs to establish with new PCP  - Follow-up with neurology in 6 to 8 weeks  - Referral for oncology given biopsy results   - GILDA: outpatient referral to sleep medicine clinic if agreeable      Seen and discussed with Dr. Marcos, PM&R staff physician     Corinne Macias, DO  PGY-2  Physical Medicine and Rehabilitation

## 2022-09-22 NOTE — PLAN OF CARE
Discharge Planner Post-Acute Rehab PT:      Discharge Plan: Home with  and son assist, 2 DAVE without rail (plan for ramp). Home care PT.     Precautions: Falls, L hemiplegia, dysphagia, breast CA (cleared for NMES)     Current Status:  Bed Mobility: SBA rolling and supine<>sit, cues for attn to L side  Transfer: Ax1 SaraStedy with nsg. CGA squat/stand pivot to the R, min/mod A to the L.  Gait: up to 20 ft, mod A for trunk and L LE mgmt at R rail + WC follow  Stairs: not appropriate at this time  Balance: Fair dynamic seated. Requires UE support and L tibial blocking for standing                  PASS on 9/6: 13/36                             on 9/13: 21/36     Assessment: WC evaluation and family training completed today, with family able to complete hands-on practice and videos taken for reference for safety with bed mobility and transfers to the L and R, WC<>EOM as well as in/out of the car. Reviewed recommendation for ramp installation in order to access home.     Other Barriers to Discharge (DME, Family Training, etc):   Equipment: Plan to issue gait belt and L Eunice Neurexa. Ramp resources handout provided.  L AFO received/fitted from O&P.  WC eval completed 9/22 (K4+L arm tray)  Family training completed 9/22, with plan for additional session prior to discharge

## 2022-09-22 NOTE — PLAN OF CARE
Discharge Planner Post-Acute Rehab SLP:     Discharge Plan:  SLP    Precautions: aspiration, fall    Current Status:  Communication: Word finding difficulties in conversation   Cognition: Mild-mod cognitive impairments with deficits re: memory, attention, reasoning, language. Benefits from extra time   Swallow: Regular/thin (0) liquid. Fully upright, straws okay, small single bites and sips.    Assessment: Pt seen with trials regular texture, thin (0) liquids by cup and straw. Pt with timely and functional oral phase, no concerns. Few instances intermittent dry cough following sips 0 liquid but coughing also not related to PO. Reviewed plan for therapy and cosideration to reduce time to allow for greater time towards physical goals. Pt in agreement. Dysphagia related goals met. Reviewed VFSS images wiht pt    Other Barriers to Discharge (Family Training, etc): family training education  and IADL support

## 2022-09-22 NOTE — PLAN OF CARE
Discharge Planner Post-Acute Rehab PT:     Discharge Plan: Home with  assist, 1 DAVE without rail. Home care vs OP PT, pending progress.     Precautions: Falls, L hemiplegia, dysphagia, breast CA (cleared for NMES)    Current Status:  Bed Mobility: rolls with rails & SBA; supine to/from sit with rail & SBA  Transfer: W/C to/from bed lateral scoot transfer with min/CGA toward right, min/mod assist toward left  A x 1 using ubaldo magdaleno with nursing staff  Gait: not assessed this date; up to 20 ft, mod A for trunk and L LE mgmt at R rail + WC follow (9/19/22)  Stairs: not appropriate at this time  Balance: sitting EOB with SBA; standing with mod assist with use of quad cane & left AFO                  PASS on 9/6: 13/36                             on 9/13: 21/36    Assessment: excellent participation & highly motivated to return home; lateral scoot transfers W/C to/from bed requiring min/mod assist toward pt's left (min/CGA toward pt's right)    Other Barriers to Discharge (DME, Family Training, etc):   Equipment: Anticipate K4 WC, quad cane, gait belt, L Eunice Neurexa, L AFO  Family training to be scheduled

## 2022-09-22 NOTE — PROGRESS NOTES
Discharge Planner Post-Acute Rehab OT:      Discharge Plan: home with spouse and adult son A, HC OT     Precautions: falls, dense L jong, monitor BP, LUE omoneurexa OOB for LUE mgmt, breast cancer     Current Status:  ADLs:    Mobility: Ax1 SaraStedy. Squat/stand pivot min Ax1 to R side, mod A L, needs skilled set up. Recommend Ax1 SaraStedy with nsg. Ok to sit EOB independently - no alarms.    Grooming: SBA EOB wash face, Mod A wash hands. Set up/clean up oral cares.    Dressing: UB- Mod A shirt and LUE yoana neurexa. LB - Max A shorts rolling and reacher. Feet - Don socks SBA RLE, A to hold LLE in fig four. SBA don RLE shoe, Max A don LLE AFO and shoe.    Bathing: Max A sponge bathing. Transfer N/A d/t isolation precautions upon arrival. Need to reassess now off iso.    Toileting: Transfer SaraStedy <> commode overlay. Total A cares and clothing mgmt.   IADLs: IND PTA. Anticipate A at discharge.  Vision/Cognition: L strabismus baseline with pt reporting, pt reports worse vision in L eye at baseline. Functional deficits problem solving, memory, and wordfinding.     Assessment: Completed caregiver training with pt and pt's 2 sons and spouse. Educated on recommended DME and issued handouts. Plan to have additional caregiver training closer to discharge. Need to reassess/problem solve walk-in shower and toilet transfers simulating home environment as s/u not conducive for gbs. Need to reassess bathing A since off isolation precautions.     Other Barriers to Discharge (DME, Family Training, etc): 1 stair to enter from garage, then all needs met on main level. Walk in shower with high lip with showerhead on R wall and vinyl inlay (not able to apply stud gbs). Standard height toilet without wall to secure gb. Need to reassess/problem solve walk-in shower and toilet transfers simulating home environment as s/u not conducive for gbs.      DME: Recommend ETB, BSC/OTC, reacher, yoana neurexa, temporary bed rail, bidet, suction  gb shower, [facility]. Family provided handouts.     Family training - Thursday 9/22 PM with sons and spouse completed. Anticipate additional training week of discharge, will follow up with family after Tuesday rounds.

## 2022-09-22 NOTE — CONSULTS
Stroke Education Note    The following information has been reviewed with the patient and family:    1. Warning signs of stroke    2. Calling 911 if having warning signs of stroke    3. All modifiable risk factors: hypertension, CAD, atrial fib, diabetes, hypercholesterolemia, smoking, substance abuse, diet, physical inactivity, obesity, sleep apnea.    4. Patient's risk factors for stroke which include: hypertension, hypercholesterolemia, sleep apnea    5. Follow-up plan for after discharge    6. Discharge medications which include: amlodipine, aspirin, Lipitor, Plavix, lisinopril    In addition, the above information was given to the patient and family in writing as a part of the [facility] Stroke Class Handout.    Learner's response to risk factors / lifestyle modification education: Reasons to change     Carol Polk RN

## 2022-09-22 NOTE — PLAN OF CARE
FOCUS/GOAL  Bowel management, Bladder management, Pain management and Cognition/Memory/Judgment/Problem solving    ASSESSMENT, INTERVENTIONS AND CONTINUING PLAN FOR GOAL:  Pt is alert and oriented x4, denies fever, chills, chest pain, SOB, N/V, abdominal pain, or new weakness/numbness/tingling, continent of bowel and bladder, transferring with assist of 1 and Lea Giraldo, sleeping well throughout the night, no tubes, lines or drains, vs stable, dressing intact to L breast, no further care concerns at this time continue with POC.       Goal Outcome Evaluation: No change

## 2022-09-22 NOTE — PLAN OF CARE
Alert and oriented x 4, denies headache or dizziness. Word finding difficulty persist. No Bm for 3 day, passing flatus though,  prn miralax given. Dressing intact on her breast mass, family at bedside, participating in PT/OT training. Will continue poc        Goal Outcome Evaluation:

## 2022-09-22 NOTE — PLAN OF CARE
Patient is Aox4. Able to make needs known. Denies pain. Continent of bowel and bladder. LBM 9/20. A1 with ubaldo perrin. Patient has tumor/ulcer on left breast; dressing change once per day.  Denies chest pain and SOB.  Endorses tingling in left arm.  Chemotherapy precautions.  Left sided weakness in upper and lower extremity.

## 2022-09-23 ENCOUNTER — APPOINTMENT (OUTPATIENT)
Dept: SPEECH THERAPY | Facility: CLINIC | Age: 67
DRG: 056 | End: 2022-09-23
Attending: PHYSICAL MEDICINE & REHABILITATION
Payer: COMMERCIAL

## 2022-09-23 ENCOUNTER — APPOINTMENT (OUTPATIENT)
Dept: OCCUPATIONAL THERAPY | Facility: CLINIC | Age: 67
DRG: 056 | End: 2022-09-23
Attending: PHYSICAL MEDICINE & REHABILITATION
Payer: COMMERCIAL

## 2022-09-23 ENCOUNTER — APPOINTMENT (OUTPATIENT)
Dept: PHYSICAL THERAPY | Facility: CLINIC | Age: 67
DRG: 056 | End: 2022-09-23
Attending: PHYSICAL MEDICINE & REHABILITATION
Payer: COMMERCIAL

## 2022-09-23 PROCEDURE — 250N000011 HC RX IP 250 OP 636

## 2022-09-23 PROCEDURE — 250N000013 HC RX MED GY IP 250 OP 250 PS 637: Performed by: INTERNAL MEDICINE

## 2022-09-23 PROCEDURE — 97130 THER IVNTJ EA ADDL 15 MIN: CPT | Mod: GN

## 2022-09-23 PROCEDURE — 128N000003 HC R&B REHAB

## 2022-09-23 PROCEDURE — 99232 SBSQ HOSP IP/OBS MODERATE 35: CPT | Performed by: PHYSICAL MEDICINE & REHABILITATION

## 2022-09-23 PROCEDURE — 97750 PHYSICAL PERFORMANCE TEST: CPT | Mod: GP | Performed by: PHYSICAL THERAPIST

## 2022-09-23 PROCEDURE — 250N000013 HC RX MED GY IP 250 OP 250 PS 637: Performed by: PHYSICAL MEDICINE & REHABILITATION

## 2022-09-23 PROCEDURE — 250N000013 HC RX MED GY IP 250 OP 250 PS 637: Performed by: STUDENT IN AN ORGANIZED HEALTH CARE EDUCATION/TRAINING PROGRAM

## 2022-09-23 PROCEDURE — 250N000013 HC RX MED GY IP 250 OP 250 PS 637

## 2022-09-23 PROCEDURE — 97129 THER IVNTJ 1ST 15 MIN: CPT | Mod: GN

## 2022-09-23 PROCEDURE — 97530 THERAPEUTIC ACTIVITIES: CPT | Mod: GP | Performed by: PHYSICAL THERAPIST

## 2022-09-23 PROCEDURE — 97112 NEUROMUSCULAR REEDUCATION: CPT | Mod: GO

## 2022-09-23 RX ADMIN — POLYETHYLENE GLYCOL 3350 17 G: 17 POWDER, FOR SOLUTION ORAL at 08:18

## 2022-09-23 RX ADMIN — PANTOPRAZOLE SODIUM 40 MG: 40 TABLET, DELAYED RELEASE ORAL at 08:18

## 2022-09-23 RX ADMIN — HEPARIN SODIUM 5000 UNITS: 5000 INJECTION, SOLUTION INTRAVENOUS; SUBCUTANEOUS at 08:18

## 2022-09-23 RX ADMIN — ATORVASTATIN CALCIUM 80 MG: 80 TABLET, FILM COATED ORAL at 20:03

## 2022-09-23 RX ADMIN — CLOPIDOGREL BISULFATE 75 MG: 75 TABLET, FILM COATED ORAL at 08:18

## 2022-09-23 RX ADMIN — ASPIRIN 81 MG: 81 TABLET, COATED ORAL at 08:18

## 2022-09-23 RX ADMIN — NITROFURANTOIN (MONOHYDRATE/MACROCRYSTALS) 100 MG: 75; 25 CAPSULE ORAL at 17:54

## 2022-09-23 RX ADMIN — Medication 25 MG: at 20:03

## 2022-09-23 RX ADMIN — NITROFURANTOIN (MONOHYDRATE/MACROCRYSTALS) 100 MG: 75; 25 CAPSULE ORAL at 06:11

## 2022-09-23 RX ADMIN — HEPARIN SODIUM 5000 UNITS: 5000 INJECTION, SOLUTION INTRAVENOUS; SUBCUTANEOUS at 20:03

## 2022-09-23 RX ADMIN — LISINOPRIL 30 MG: 20 TABLET ORAL at 20:03

## 2022-09-23 RX ADMIN — LETROZOLE 2.5 MG: 2.5 TABLET, FILM COATED ORAL at 20:03

## 2022-09-23 RX ADMIN — AMLODIPINE BESYLATE 10 MG: 10 TABLET ORAL at 08:18

## 2022-09-23 RX ADMIN — Medication 5 MG: at 20:03

## 2022-09-23 ASSESSMENT — ACTIVITIES OF DAILY LIVING (ADL)
ADLS_ACUITY_SCORE: 48

## 2022-09-23 NOTE — PROGRESS NOTES
Discharge Planner Post-Acute Rehab OT:      Discharge Plan: home with spouse and adult son A, HC OT     Precautions: falls, dense L jong, monitor BP, LUE omoneurexa OOB for LUE mgmt, breast cancer     Current Status:  ADLs:    Mobility: Ax1 SaraStedy. Squat/stand pivot min Ax1 to R side, mod A L, continue to have pt self initiate set up. Recommend Ax1 SaraStedy with nsg. Ok to sit EOB independently - no alarms.    Grooming: SBA EOB wash face, Mod A wash hands. Set up/clean up oral cares.    Dressing: UB- Mod A shirt and LUE yoana neurexa. LB - Max A shorts rolling and reacher. Feet - Don socks SBA RLE, A to hold LLE in fig four. SBA don RLE shoe, Max A don LLE AFO and shoe.    Bathing: Max A sponge bathing. Transfer N/A d/t isolation precautions upon arrival. Need to reassess now off iso.    Toileting: Transfer SaraStedy <> commode overlay. Total A cares and clothing mgmt.   IADLs: IND PTA. Anticipate A at discharge.  Vision/Cognition: L strabismus baseline with pt reporting, pt reports worse vision in L eye at baseline. Functional deficits problem solving, memory, and wordfinding.     Assessment: Pt needs cues to self initiated set up of w/c for squat pivot tsfer to the R. Needed several adjustment for tolerable intensity per pt request.    Skilled set up on :  Pt dependent for proper placement of electrodes on LUE for optimum muscle contraction, safe positioning on w/c within frame and cushion for prevention of skin breakdown, UE secured to arm support.  Passive motion assessed to ensure proper positioning.      Pt performed 30 minutes of active FES ergometry with 0-100% stimulation applied to above muscles at 30 rpm with 0.50nm resistance.  This OT adjusted e-stim and cycling parameters in real-time to ensure palpable muscle contractions throughout session.  Please see [email or web address] for further details on patient's stimulation parameters and ergometry outcomes.    Changes in parameters that were part of this  treatment:   -intensity    Functional outcomes from this intervention include:   -reduced spasticity  -improved sensory awareness and proprioception  -improved muscle strength  -improved motor coordination      Other Barriers to Discharge (DME, Family Training, etc): 1 stair to enter from garage, then all needs met on main level. Walk in shower with high lip with showerhead on R wall and vinyl inlay (not able to apply stud gbs). Standard height toilet without wall to secure gb. Need to reassess/problem solve walk-in shower and toilet transfers simulating home environment as s/u not conducive for gbs.      DME: Recommend ETB, BSC/OTC, reacher, yoana neurexa, temporary bed rail, bidet, suction gb shower, [facility]. Family provided handouts.     Family training - Thursday 9/22 PM with sons and spouse completed. Anticipate additional training week of discharge, will follow up with family after Tuesday rounds.

## 2022-09-23 NOTE — PLAN OF CARE
FOCUS/GOAL  Bowel management, Bladder management, Pain management and Cognition/Memory/Judgment/Problem solving    ASSESSMENT, INTERVENTIONS AND CONTINUING PLAN FOR GOAL:  Pt is alert and oriented x4, denies fever, chills, chest pain, SOB, N/V, abdominal pain, or new weakness/numbness/tingling, continent of bowel and bladder, transferring with assist of 1 and ubaldo magdaleno, sleeping well throughout the night, no tubes, lines or drains, vs stable, dressing intact to left breast, no further care concerns at this time continue with POC.         Goal Outcome Evaluation: No change

## 2022-09-23 NOTE — PROGRESS NOTES
"  [facility]   Acute Rehabilitation Unit    INTERVAL HISTORY  Stephani is a 66 yo F who reports doing well.  No acute events overnight.  Denies chest pain, shortness of breath, no fever or chills.  Feels like the L AFO is helping her leg.  Still limited hand and wrist extension.  Continues to mkae gains but discussed prolonged CVA recovery.    Functional  PT:  Bed Mobility: SBA rolling and supine<>sit, cues for attn to L side  Transfer: Ax1 SaraStedy with nsg. CGA squat/stand pivot to the R, min/mod A to the L.  Gait: up to 20 ft, mod A for trunk and L LE mgmt at R rail + WC follow  Stairs: not appropriate at this time  Balance: Fair dynamic seated. Requires UE support and L tibial blocking for standing                  PASS on 9/6: 13/36                             on 9/13: 21/36       ROS: 10 point ROS neg other than the symptoms noted above in the HPI.              MEDICATIONS  Scheduled meds    amLODIPine  10 mg Oral Daily     aspirin  81 mg Oral Daily     atorvastatin  80 mg Oral QPM     clopidogrel  75 mg Oral Daily     heparin ANTICOAGULANT  5,000 Units Subcutaneous Q12H     letrozole  2.5 mg Oral At Bedtime     lisinopril  30 mg Oral At Bedtime     melatonin  5 mg Oral At Bedtime     nitroFURantoin macrocrystal-monohydrate  100 mg Oral Q12H NEIL (08/20)     pantoprazole  40 mg Oral QAM AC     polyethylene glycol  17 g Oral Daily     traZODone  25 mg Oral At Bedtime       PRN meds:  acetaminophen, bisacodyl, hydrALAZINE, senna-docusate      PHYSICAL EXAM  /69 (BP Location: Right arm, Patient Position: Semi-West's, Cuff Size: Adult Regular)   Pulse 75   Temp 97.4  F (36.3  C) (Oral)   Resp 18   Ht 1.702 m (5' 7\")   Wt 75.3 kg (166 lb)   SpO2 96%   BMI 26.00 kg/m    Gen: awake, alert  HEENT: EOMI, NCAT   Cardio: RRR, no murmur  Pulm: on room air, symmetrical chest rise   Abd: soft, nontender, nondistended  Ext: moves RUE, RLE freely, more movement present in LUE " "and LLE than yesterday  Neuro: sensation intact to soft touch at BLE, left hemiparesis getting proximal return.    MSK: strength 4/5 in L HF, 5/5 in R HF    LABS  No results found for this or any previous visit (from the past 24 hour(s)).    ASSESSMENT AND PLAN    Stephani Garcia is a 67 year old left hand dominant female with PMHx HTN, HLD, vertigo, GILDA (not using CPAP). Admitted for acute ischemic infarcts with right frontal lobe and right basal ganglia involvement. Has functional deficits of weak L. shoulder shrug, L. Hemiparesis, dysphagia, dysarthria, possbile neurogenic bowel/bladder and cognitive deficits. She also has possible L. Breast CA pending pathology results. She is admitted to undergo therapies with PT/OT/SLP.     Admission to acute inpatient rehab: 9/5/22  Impairment group code: 01.1  Stroke Ischemic  (L) Body Involvement (R) Brain: small cluster of recent infarcts in the right frontal lobe and right basal ganglia.       # Acute ischemic stroke   # Recent infarcts in the right frontal lobe and right basal ganglia  Noted left-sided partial hemiparesis and mild dysarthria/expressive aphasia at presentation 8/30. MRI confirmed  Acute ischemic stroke, and TPA/ thrombectomy 2/2 unknown last known normal. CTA showed no large vessel occlusion, with only 30% of ICA occlusion. Echo was normal EF 65% on 8/31. No Afib noted with telemetry.  at baseline. BP goal is <140/90 at rest, anticipate increase with therapies.   - Plavix 75 mg and aspirin 81 mg until 11/30   -aspirin 325mg starting 12/1  -  Atorvastatin 80 mg    - PT/OT/SLP     # Dysphagia    Video swallow study on 9/2/22: \"Patient had direct, silent aspiration with mildly thick liquid on first trial and deep penetration with mildly thick liquids on subsequent trials. No aspiration or penetration with moderately thick liquids.   - Continue dysphagia diet with moderately thick fluids  - SLP  - repeat swallow study for 9/20 - Pt presents with " significant improved oropharyngeal swallow function and demonstrated no aspiration across all consistencies. Pt with few instances reduced laryngeal vestibule closure resulting in intermittent flash penetration with mildly thick (2) and large sips thin (0) liquid. Recommend pt continue regular texture, advance to level 0 liquid. straws ok. Ensure upright fully with all PO.     # Left Breast Ductal Carcinoma   FNA 9/2. There is a high suspicion for breast CA. Surgery rec'd neoadjuvent treatment with hopes of shrinking tumor prior to surgery. Unable to surgically operate due to recent stroke  - Needs referral to oncology for outpatient follow-up.  --9/7 biopsy results: ductal carcinoma and estrogen and progesterone positive.     - [facility]  - WOC consult: appreciate recs 9/6   -Letrozole 2.5 mg daily     #UTI  -urine from 9/19 showed WBCs, leukocyte esterase, mucus  -Pt reports abdominal pain, dysuria, similar to previous UTIs  -Macrobid BID for 5 days, end 9/24     # Hyperlipidemia    at baseline.  - Atorvastatin 80 mg      # Hypertension   Goal BP <140/90 at rest.  - Lisinopril 30 mg daily  - Amlodipine 10 mg Daily 9/15  - Hydralazine 10 mg PRN for SBP over 170     # Neurogenic Bladder  - Timed voiding  - purewick HS     # Bowel   -Miralax daily  - PRN Senna  - PRN bisacodyl suppository     # Sleep   - Melatonin 5 mg at bedtime  - Trazodone 25 mg at bedtime     # Asymptomatic COVID 19 Infection - Resolved  Positive Covid test on 8/3 with preceding cold symptoms for 3 days prior. Currently she continues remain asymptomatic from COVID symptoms on 9/5. She has no cough, wheezing, SOB, fever, chills, body aches or any type of cold symptoms.  She has completed remdesivir x 3 days.  Off of quarantine     #GILDA (obstructive sleep apnea):  Patient does not use CPAP at baseline. May need repeat sleep study and eval by sleep medicine team.   - encourage using CPAP         6. Adjustment to  disability:  Clinical psychology to eval and treat, seen 9/13  7. FEN: Regular Diet Adult Liquidized/Moderately Thick (level 3)  8. Bowel: PRN Senna and Miralax  9. Bladder: Timed voiding  10. DVT Prophylaxis: Heparin 5000 units for DVT ppx. Plavix 75 mg daily and ASA 81 mg 90 days then ASA alone.   11. GI Prophylaxis: Pantoprazole 40 mg   12. Code: Full  13. Disposition: Home  14. ELOS:  9/30/2022  15. Rehab prognosis:  Fair  16. Follow up Appointments on Discharge:   - PCP follow up 7-14 days post discharge - needs to establish with new PCP  - Follow-up with neurology in 6 to 8 weeks  - Referral for oncology given biopsy results   - GILDA: outpatient referral to sleep medicine clinic if agreeable          Andrew Marcos, DO  Physical Medicine and Rehabilitation        I spent a total of 25 minutes face to face and coordinating care of Stephani Garcia. Over 50% of my time on the unit was spent counseling the patient and /or coordinating care regarding post CVA.

## 2022-09-23 NOTE — PLAN OF CARE
Discharge Planner Post-Acute Rehab SLP:     Discharge Plan:  SLP    Precautions: aspiration, fall    Current Status:  Communication: Word finding difficulties in conversation   Cognition: Mild-mod cognitive impairments with deficits re: memory, attention, reasoning, language. Benefits from extra time   Swallow: Regular/thin (0) liquid. Fully upright, straws okay, small single bites and sips.    Assessment: Pt participated in mental flexiblity/reasoning task via [employer]. Introduced to strategies and procedure with task. Pt demonstrated excellent recall/carryover of strategies and reasoning. required min cues overall to complete task successfully    Other Barriers to Discharge (Family Training, etc): family training education and IADL support

## 2022-09-23 NOTE — PLAN OF CARE
Goal Outcome Evaluation:    Pt is alert and oriented x4, denies any pain this shift. Pt has left sided weakness. Continent of bowel and bladder, last bowel movement 9/20/22. Transfers assist of 1 with sera steady. Dressing on left breast, CDI. Pt is able to make needs known and uses the call light appropriately. Continue with the plan of care.    Patient's most recent vital signs are:    Vital signs:  BP: 124/69  Temp: 97.4  HR: 75  RR: 18  SpO2: 96 %    Patient does not have new respiratory symptoms.  Patient does not have new sore throat.  Patient does not have a fever greater than 99.5.

## 2022-09-23 NOTE — PLAN OF CARE
Postural Assessment for Stroke Scale (PASS)    Maintaining posture -   1) Sitting without support - 3  2) Standing with support (feet position free) - 3  3) Standing without support (feet position free) - 1  4) Standing on nonparetic leg - 0  5) Standing on paretic leg - 0    Changing posture -  6) Rolling supine -> affected side - 3  7) Rolling supine -> unaffected side - 3  8) Sup->sitting edge of bed - 3  9) Sitting edge of bed -> sup - 3  10) Sit->stand without support - 2  11) Stand->sit without support - 2  12) Standing,  pencil from floor without support - 0    Total Score - 23/36    The PASS assesses a patient's ability to change and maintain posture during different balance activities. It is not associated with falls risk, but is used to track progress with the patient's ability to control their posture and balance throughout the course of the patient's admission.

## 2022-09-23 NOTE — PLAN OF CARE
Discharge Planner Post-Acute Rehab PT:      Discharge Plan: Home WC-based with  and son assist, 2 DAVE without rail (plan for ramp). Home care PT.     Precautions: Falls, L hemiplegia, dysphagia, breast CA (cleared for NMES)     Current Status:  Bed Mobility: SBA rolling and supine<>sit, cues for attn to L side  Transfer: Ax1 SaraStedy with nsg. CGA squat/stand pivot to the R, min/mod A to the L.  Gait: up to 20 ft, mod A for trunk and L LE mgmt at R rail + WC follow  Stairs: not appropriate at this time  Balance: PASS on 9/6: 13/36                            on 9/13: 21/36      On 9/23: 23/36   Bell on 9/23: 12/56     Assessment: Continues to be demonstrating improving L-sided activation>>functional mobility, as assessed through PASS; however, remains at high risk for future falls with above Bell testing. Cognition remains a limiting factor, as pt requires consistent step-by-step cues for proper transfer set-up and sequencing.     Other Barriers to Discharge (DME, Family Training, etc):   Equipment: Plan to issue gait belt and L Eunice Neurexa. Ramp resources handout provided.  L AFO received/fitted from O&P.  WC eval completed 9/22 (K4+L arm tray)  Family training completed 9/22, with plan for additional session prior to discharge

## 2022-09-24 ENCOUNTER — APPOINTMENT (OUTPATIENT)
Dept: SPEECH THERAPY | Facility: CLINIC | Age: 67
DRG: 056 | End: 2022-09-24
Attending: PHYSICAL MEDICINE & REHABILITATION
Payer: COMMERCIAL

## 2022-09-24 ENCOUNTER — APPOINTMENT (OUTPATIENT)
Dept: OCCUPATIONAL THERAPY | Facility: CLINIC | Age: 67
DRG: 056 | End: 2022-09-24
Attending: PHYSICAL MEDICINE & REHABILITATION
Payer: COMMERCIAL

## 2022-09-24 ENCOUNTER — APPOINTMENT (OUTPATIENT)
Dept: PHYSICAL THERAPY | Facility: CLINIC | Age: 67
DRG: 056 | End: 2022-09-24
Attending: PHYSICAL MEDICINE & REHABILITATION
Payer: COMMERCIAL

## 2022-09-24 PROCEDURE — 128N000003 HC R&B REHAB

## 2022-09-24 PROCEDURE — 250N000013 HC RX MED GY IP 250 OP 250 PS 637

## 2022-09-24 PROCEDURE — 250N000013 HC RX MED GY IP 250 OP 250 PS 637: Performed by: PHYSICAL MEDICINE & REHABILITATION

## 2022-09-24 PROCEDURE — 97535 SELF CARE MNGMENT TRAINING: CPT | Mod: GO

## 2022-09-24 PROCEDURE — 97535 SELF CARE MNGMENT TRAINING: CPT | Mod: GO | Performed by: OCCUPATIONAL THERAPIST

## 2022-09-24 PROCEDURE — 97150 GROUP THERAPEUTIC PROCEDURES: CPT | Mod: GO | Performed by: OCCUPATIONAL THERAPIST

## 2022-09-24 PROCEDURE — 97112 NEUROMUSCULAR REEDUCATION: CPT | Mod: GP

## 2022-09-24 PROCEDURE — 250N000013 HC RX MED GY IP 250 OP 250 PS 637: Performed by: STUDENT IN AN ORGANIZED HEALTH CARE EDUCATION/TRAINING PROGRAM

## 2022-09-24 PROCEDURE — 97130 THER IVNTJ EA ADDL 15 MIN: CPT | Mod: GN

## 2022-09-24 PROCEDURE — 97530 THERAPEUTIC ACTIVITIES: CPT | Mod: GP

## 2022-09-24 PROCEDURE — 250N000011 HC RX IP 250 OP 636

## 2022-09-24 PROCEDURE — 250N000013 HC RX MED GY IP 250 OP 250 PS 637: Performed by: INTERNAL MEDICINE

## 2022-09-24 PROCEDURE — 97110 THERAPEUTIC EXERCISES: CPT | Mod: GO

## 2022-09-24 PROCEDURE — 97129 THER IVNTJ 1ST 15 MIN: CPT | Mod: GN

## 2022-09-24 PROCEDURE — 99233 SBSQ HOSP IP/OBS HIGH 50: CPT | Mod: GC | Performed by: PHYSICAL MEDICINE & REHABILITATION

## 2022-09-24 RX ADMIN — Medication 5 MG: at 20:51

## 2022-09-24 RX ADMIN — CLOPIDOGREL BISULFATE 75 MG: 75 TABLET, FILM COATED ORAL at 09:32

## 2022-09-24 RX ADMIN — LISINOPRIL 30 MG: 20 TABLET ORAL at 20:52

## 2022-09-24 RX ADMIN — POLYETHYLENE GLYCOL 3350 17 G: 17 POWDER, FOR SOLUTION ORAL at 09:33

## 2022-09-24 RX ADMIN — AMLODIPINE BESYLATE 10 MG: 10 TABLET ORAL at 09:32

## 2022-09-24 RX ADMIN — ATORVASTATIN CALCIUM 80 MG: 80 TABLET, FILM COATED ORAL at 20:51

## 2022-09-24 RX ADMIN — LETROZOLE 2.5 MG: 2.5 TABLET, FILM COATED ORAL at 20:52

## 2022-09-24 RX ADMIN — BISACODYL 10 MG: 10 SUPPOSITORY RECTAL at 17:38

## 2022-09-24 RX ADMIN — Medication 25 MG: at 20:51

## 2022-09-24 RX ADMIN — HEPARIN SODIUM 5000 UNITS: 5000 INJECTION, SOLUTION INTRAVENOUS; SUBCUTANEOUS at 20:51

## 2022-09-24 RX ADMIN — HEPARIN SODIUM 5000 UNITS: 5000 INJECTION, SOLUTION INTRAVENOUS; SUBCUTANEOUS at 09:33

## 2022-09-24 RX ADMIN — SENNOSIDES AND DOCUSATE SODIUM 3 TABLET: 8.6; 5 TABLET ORAL at 09:40

## 2022-09-24 RX ADMIN — ASPIRIN 81 MG: 81 TABLET, COATED ORAL at 09:32

## 2022-09-24 RX ADMIN — PANTOPRAZOLE SODIUM 40 MG: 40 TABLET, DELAYED RELEASE ORAL at 09:32

## 2022-09-24 RX ADMIN — NITROFURANTOIN (MONOHYDRATE/MACROCRYSTALS) 100 MG: 75; 25 CAPSULE ORAL at 06:12

## 2022-09-24 ASSESSMENT — ACTIVITIES OF DAILY LIVING (ADL)
ADLS_ACUITY_SCORE: 48

## 2022-09-24 NOTE — PLAN OF CARE
Discharge Planner Post-Acute Rehab PT:      Discharge Plan: Home WC-based with  and son assist, 2 DAVE without rail (plan for ramp). Home care PT.     Precautions: Falls, L hemiplegia, dysphagia, breast CA (cleared for NMES)     Current Status:  Bed Mobility: SBA rolling and supine<>sit, cues for attn to L side  Transfer: Ax1 SaraStedy with nsg. CGA squat/stand pivot to the R, min/mod A to the L.  Gait: up to 20 ft, mod A for trunk and L LE mgmt at R rail + WC follow  Stairs: not appropriate at this time  Balance: PASS on 9/6: 13/36                            on 9/13: 21/36                            On 9/23: 23/36              Bell on 9/23: 12/56     Assessment: Focus on IND with bed mobility and squat pivot transfers to R and L sides, continues to improve with only CGA at R and Marilynn to L but still needing cues for set up and sequencing.      Other Barriers to Discharge (DME, Family Training, etc):   Equipment: Plan to issue gait belt and L Eunice Neurexa. Ramp resources handout provided.  L AFO received/fitted from O&P.  WC eval completed 9/22 (K4+L arm tray)  Family training completed 9/22, with plan for additional session prior to discharge

## 2022-09-24 NOTE — PLAN OF CARE
FOCUS/GOAL  Bowel management, Bladder management, Mobility, and Skin integrity    ASSESSMENT, INTERVENTIONS AND CONTINUING PLAN FOR GOAL:    Pt is A&Ox4. Uses call light to make needs known. Denies pain. Slurred speech continues. LUE flaccid. Reg/Thin/Whole pills. Ax1 w/ ubaldo stedy to toilet. Continent of B&B, LBM: 9/20/2022. VSS. Will continue w/ poc.

## 2022-09-24 NOTE — PLAN OF CARE
Discharge Planner Post-Acute Rehab SLP:     Discharge Plan:  SLP    Precautions: aspiration, fall    Current Status:  Communication: Word finding difficulties in conversation   Cognition: Mild-mod cognitive impairments with deficits re: memory, attention, reasoning, language. Benefits from extra time   Swallow: Regular/thin (0) liquid. Fully upright, straws okay, small single bites and sips.    Assessment: Pt performed an information processing/organization task to answer functional math/problem solving tasks x 80% accuracy with min cues.  Accuracy improved to 100% with mod cues.    Other Barriers to Discharge (Family Training, etc): family training education and IADL support

## 2022-09-24 NOTE — PLAN OF CARE
Goal Outcome Evaluation:  FOCUS/GOAL  Bowel management, Medication management, and Reinforcement of self-care/ADL    ASSESSMENT, INTERVENTIONS AND CONTINUING PLAN FOR GOAL:  Patient is alert/oriented x4, some dysarthria but able to communicate needs appropriately.  Patient denies any pain on this shift, is able to use call light but alarms on at all times as well. Patient's VSS, participating with therapy.  No bm today and has not had one for several days, denies discomfort but took miralax as well as 3 PRN senna this am, still awaiting results. Encouraged to try suppository after therapy today, no additional care concerns, continue with POC.

## 2022-09-24 NOTE — PLAN OF CARE
"Goal Outcome Evaluation:    Plan of Care Reviewed With: patient     Overall Patient Progress: no change    ORIENTATION: A/O x4  TRANSFERS/AMBULATION: ubaldo steady A1, GB  DIET: reg/thin/whole. Denies n/v  BOWEL/BLADDER: continent B/B; LBM 9/24 post supp  PAIN: denies pain, SOB  LDA: none  SKIN: L breast mass; dressing changed. Declined mepilex to coccyx. Skin intact,  Blanchable redness noted.     /67 (BP Location: Right arm)   Pulse 104   Temp (!) 96.1  F (35.6  C) (Oral)   Resp 20   Ht 1.702 m (5' 7\")   Wt 75.3 kg (166 lb)   SpO2 97%   BMI 26.00 kg/m            "

## 2022-09-24 NOTE — PROGRESS NOTES
Discharge Planner Post-Acute Rehab OT:      Discharge Plan: home with spouse and adult son A, HC OT     Precautions: falls, dense L jong, monitor BP, LUE omoneurexa OOB for LUE mgmt, breast cancer     Current Status:  ADLs:    Mobility: Ax1 SaraStedy. Squat/stand pivot min Ax1 to R side, mod A L, continue to have pt self initiate set up. Recommend Ax1 SaraStedy with nsg. Ok to sit EOB independently - no alarms.    Grooming: SBA EOB wash face, Mod A wash hands. Set up/clean up oral cares.    Dressing: UB- Mod A shirt and LUE yoana neurexa. LB - Max A shorts rolling and reacher. Feet - Don socks SBA RLE, A to hold LLE in fig four. SBA don RLE shoe, Max A don LLE AFO and shoe.    Bathing: Max A sponge bathing. Transfer N/A d/t isolation precautions upon arrival. Need to reassess now off iso.    Toileting: Transfer SaraStedy <> commode overlay. Total A cares and clothing mgmt.   IADLs: IND PTA. Anticipate A at discharge.  Vision/Cognition: L strabismus baseline with pt reporting, pt reports worse vision in L eye at baseline. Functional deficits problem solving, memory, and wordfinding.     Assessment: Pt still needs cues for set up of w/c and safety w/ tech. Engaged in LUE table top ex for initiating shoulder deltoid activity which continued to be limited. Sh and scapular mobilization prior to ex. Pt displayed soft tissue tightness in upper/middle traps w/ manual therapy initiated. Assessed simulation for walk in shower w/ tub bench verse chair going to L side but pt still needs practice to L side transfers and need to clarify if suction grab bar placement and appropriateness. Recommend likely tub bench verse shower chair for walk in shower.      Other Barriers to Discharge (DME, Family Training, etc): 1 stair to enter from garage, then all needs met on main level. Walk in shower with high lip with showerhead on R wall and vinyl inlay (not able to apply stud gbs). Standard height toilet without wall to secure gb. Need to  reassess/problem solve walk-in shower and toilet transfers simulating home environment as s/u not conducive for gbs.      DME: Recommend ETB, BSC/OTC, reacher, yoana neurexa, temporary bed rail, bidet, suction gb shower, [facility]. Family provided handouts.     Family training - Thursday 9/22 PM with sons and spouse completed. Anticipate additional training week of discharge, will follow up with family after Tuesday rounds.

## 2022-09-24 NOTE — PROGRESS NOTES
"OT: Pt participated in the established \"Transitions Group\" for stroke pts. Highlighting topics such as return to meaningful activities, rehab course, neuroplasticity, follow up therapy, fall prevention, health/wellness, fatigue, depression/anxiety, bowel/bladder considerations w/ community re-integration and caregiver wellness/support. Pt very receptive to class. Pt reports personal goals after discharge are to regain UE movement.    "

## 2022-09-25 ENCOUNTER — APPOINTMENT (OUTPATIENT)
Dept: SPEECH THERAPY | Facility: CLINIC | Age: 67
DRG: 056 | End: 2022-09-25
Attending: PHYSICAL MEDICINE & REHABILITATION
Payer: COMMERCIAL

## 2022-09-25 ENCOUNTER — APPOINTMENT (OUTPATIENT)
Dept: OCCUPATIONAL THERAPY | Facility: CLINIC | Age: 67
DRG: 056 | End: 2022-09-25
Attending: PHYSICAL MEDICINE & REHABILITATION
Payer: COMMERCIAL

## 2022-09-25 ENCOUNTER — APPOINTMENT (OUTPATIENT)
Dept: PHYSICAL THERAPY | Facility: CLINIC | Age: 67
DRG: 056 | End: 2022-09-25
Attending: PHYSICAL MEDICINE & REHABILITATION
Payer: COMMERCIAL

## 2022-09-25 PROCEDURE — 97129 THER IVNTJ 1ST 15 MIN: CPT | Mod: GN

## 2022-09-25 PROCEDURE — 250N000013 HC RX MED GY IP 250 OP 250 PS 637: Performed by: STUDENT IN AN ORGANIZED HEALTH CARE EDUCATION/TRAINING PROGRAM

## 2022-09-25 PROCEDURE — 250N000013 HC RX MED GY IP 250 OP 250 PS 637

## 2022-09-25 PROCEDURE — 128N000003 HC R&B REHAB

## 2022-09-25 PROCEDURE — 97535 SELF CARE MNGMENT TRAINING: CPT | Mod: GO

## 2022-09-25 PROCEDURE — 97112 NEUROMUSCULAR REEDUCATION: CPT | Mod: GO | Performed by: OCCUPATIONAL THERAPIST

## 2022-09-25 PROCEDURE — 97130 THER IVNTJ EA ADDL 15 MIN: CPT | Mod: GN

## 2022-09-25 PROCEDURE — 97116 GAIT TRAINING THERAPY: CPT | Mod: GP

## 2022-09-25 PROCEDURE — 250N000013 HC RX MED GY IP 250 OP 250 PS 637: Performed by: PHYSICAL MEDICINE & REHABILITATION

## 2022-09-25 PROCEDURE — 97110 THERAPEUTIC EXERCISES: CPT | Mod: GP

## 2022-09-25 PROCEDURE — 250N000013 HC RX MED GY IP 250 OP 250 PS 637: Performed by: INTERNAL MEDICINE

## 2022-09-25 PROCEDURE — 250N000011 HC RX IP 250 OP 636

## 2022-09-25 PROCEDURE — 97112 NEUROMUSCULAR REEDUCATION: CPT | Mod: GO

## 2022-09-25 PROCEDURE — 97530 THERAPEUTIC ACTIVITIES: CPT | Mod: GP

## 2022-09-25 RX ADMIN — LETROZOLE 2.5 MG: 2.5 TABLET, FILM COATED ORAL at 20:17

## 2022-09-25 RX ADMIN — ATORVASTATIN CALCIUM 80 MG: 80 TABLET, FILM COATED ORAL at 20:18

## 2022-09-25 RX ADMIN — ASPIRIN 81 MG: 81 TABLET, COATED ORAL at 08:48

## 2022-09-25 RX ADMIN — Medication 5 MG: at 20:18

## 2022-09-25 RX ADMIN — LISINOPRIL 30 MG: 20 TABLET ORAL at 20:17

## 2022-09-25 RX ADMIN — PANTOPRAZOLE SODIUM 40 MG: 40 TABLET, DELAYED RELEASE ORAL at 08:48

## 2022-09-25 RX ADMIN — POLYETHYLENE GLYCOL 3350 17 G: 17 POWDER, FOR SOLUTION ORAL at 08:49

## 2022-09-25 RX ADMIN — AMLODIPINE BESYLATE 10 MG: 10 TABLET ORAL at 08:48

## 2022-09-25 RX ADMIN — Medication 25 MG: at 20:18

## 2022-09-25 RX ADMIN — HEPARIN SODIUM 5000 UNITS: 5000 INJECTION, SOLUTION INTRAVENOUS; SUBCUTANEOUS at 08:48

## 2022-09-25 RX ADMIN — CLOPIDOGREL BISULFATE 75 MG: 75 TABLET, FILM COATED ORAL at 08:48

## 2022-09-25 RX ADMIN — HEPARIN SODIUM 5000 UNITS: 5000 INJECTION, SOLUTION INTRAVENOUS; SUBCUTANEOUS at 20:18

## 2022-09-25 ASSESSMENT — ACTIVITIES OF DAILY LIVING (ADL)
ADLS_ACUITY_SCORE: 48
ADLS_ACUITY_SCORE: 52
ADLS_ACUITY_SCORE: 48

## 2022-09-25 NOTE — PLAN OF CARE
Discharge Planner Post-Acute Rehab PT:      Discharge Plan: Home WC-based with  and son assist, 2 DAVE without rail (plan for ramp). Home care PT.     Precautions: Falls, L hemiplegia, dysphagia, breast CA (cleared for NMES)     Current Status:  Bed Mobility: SBA rolling and supine<>sit, cues for attn to L side  Transfer: Ax1 SaraStedy with nsg. CGA squat/stand pivot to the R, min/mod A to the L.  Gait: up to 20 ft, mod A for trunk and L LE mgmt at R rail + WC follow  Stairs: not appropriate at this time  Balance: PASS on 9/6: 13/36                            on 9/13: 21/36                            On 9/23: 23/36              Bell on 9/23: 12/56     Assessment: Focus on IND with bed mobility and squat pivot transfers to R and L sides, continues to improve with only CGA B but still needing cues for set up and sequencing.  Improved standing tolerance and control of L knee.     Other Barriers to Discharge (DME, Family Training, etc):   Equipment: Plan to issue gait belt and L Eunice Neurexa. Ramp resources handout provided.  L AFO received/fitted from O&P.  WC eval completed 9/22 (K4+L arm tray)  Family training completed 9/22, with plan for additional session prior to discharge

## 2022-09-25 NOTE — PLAN OF CARE
Discharge Planner Post-Acute Rehab SLP:     Discharge Plan:  SLP    Precautions: aspiration, fall    Current Status:  Communication: Word finding difficulties in conversation   Cognition: Mild-mod cognitive impairments with deficits re: memory, attention, reasoning, language. Benefits from extra time   Swallow: Regular/thin (0) liquid. Fully upright, straws okay, small single bites and sips.    Assessment Pt performed moderately complex information processing/organization tasks x 70% accuracy. With cues for attention to detail, accuracy increases to 100%.  Pt performed functional verbal problem solving tasks related to various home/community scenarios x 100% accuracy.  Pt demonstrates good mental flexibility.    Other Barriers to Discharge (Family Training, etc): family training education and IADL support

## 2022-09-25 NOTE — PLAN OF CARE
Discharge Planner Post-Acute Rehab PT:      Discharge Plan: Home WC-based with  and son assist, 2 DAVE without rail (plan for ramp). Home care PT.     Precautions: Falls, L hemiplegia, dysphagia, breast CA (cleared for NMES)     Current Status:  Bed Mobility: SBA rolling and supine<>sit, cues for attn to L side  Transfer: Ax1 SaraStedy with nsg. CGA squat/stand pivot to the R, min/mod A to the L.  Gait: up to 20 ft, mod A for trunk and L LE mgmt at R rail + WC follow  Stairs: not appropriate at this time  Balance: PASS on 9/6: 13/36                            on 9/13: 21/36                            On 9/23: 23/36              Bell on 9/23: 12/56     Assessment: Focus on IND with bed mobility and squat pivot transfers to R and L sides, continues to improve with only CGA B but still needing cues for set up and sequencing.  Improved standing tolerance and control of L knee.    Pm: progressed gait with hemirail R and min to mod facilitation L LE (recommend w/c follow of another)     Other Barriers to Discharge (DME, Family Training, etc):   Equipment: Plan to issue gait belt and L Eunice Neurexa. Ramp resources handout provided.  L AFO received/fitted from O&P.  WC eval completed 9/22 (K4+L arm tray)  Family training completed 9/22, with plan for additional session prior to discharge

## 2022-09-25 NOTE — PLAN OF CARE
FOCUS/GOAL  Bowel management, Bladder management, and Pain management    ASSESSMENT, INTERVENTIONS AND CONTINUING PLAN FOR GOAL:  Pt is alert and oriented x4, using calling light and able to communicate needs. Denies pain. Continent of bladder. Slept well during the overnight. Continue with plan of care.        Goal Outcome Evaluation:

## 2022-09-25 NOTE — PROGRESS NOTES
Discharge Planner Post-Acute Rehab OT:      Discharge Plan: home with spouse and adult son A, HC OT     Precautions: falls, dense L jong, monitor BP, LUE omoneurexa OOB for LUE mgmt, breast cancer     Current Status:  ADLs:    Mobility: Ax1 SaraStedy. Squat/stand pivot min Ax1 to R side, mod A L, continue to have pt self initiate set up. Recommend Ax1 SaraStedy with nsg. Ok to sit EOB independently - no alarms.    Grooming: SBA EOB wash face, Mod A wash hands. Set up/clean up oral cares.    Dressing: UB- Mod A shirt and LUE yoana neurexa. LB - Max A shorts rolling and reacher. Feet - Don socks SBA RLE, A to hold LLE in fig four. SBA don RLE shoe, Max A don LLE AFO and shoe.    Bathing: Max A sponge bathing. Transfer N/A d/t isolation precautions upon arrival. Need to reassess now off iso.    Toileting: Transfer SaraStedy <> commode overlay. Total A cares and clothing mgmt.   IADLs: IND PTA. Anticipate A at discharge.  Vision/Cognition: L strabismus baseline with pt reporting, pt reports worse vision in L eye at baseline. Functional deficits problem solving, memory, and wordfinding.     Assessment: FES for LUE neuro re-education and sensorimotor skills; refer to information below.      Other Barriers to Discharge (DME, Family Training, etc): 1 stair to enter from garage, then all needs met on main level. Walk in shower with high lip with showerhead on R wall and vinyl inlay (not able to apply stud gbs). Standard height toilet without wall to secure gb. Need to reassess/problem solve walk-in shower and toilet transfers simulating home environment as s/u not conducive for gbs.      DME: Recommend ETB, BSC/OTC, reacher, yoana neurexa, temporary bed rail, bidet, suction gb shower, [facility]. Family provided handouts.     Family training - Thursday 9/22 PM with sons and spouse completed. Anticipate additional training week of discharge, will follow up with family after Tuesday rounds.    Skilled set up on :  Pt dependent for  proper placement of electrodes on LUE for optimum muscle contraction, safe positioning on w/c within frame and cushion for prevention of skin breakdown, UE secured to arm support.  Passive motion assessed to ensure proper positioning.       Pt performed 30 minutes of active FES ergometry with 0-100% stimulation applied to above muscles at 30 rpm with 0.50nm resistance.  This OT adjusted e-stim and cycling parameters in real-time to ensure palpable muscle contractions throughout session.  Please see [email or web address] for further details on patient's stimulation parameters and ergometry outcomes.    Changes in parameters that were part of this treatment:   -intensity     Functional outcomes from this intervention include:   -reduced spasticity  -improved sensory awareness and proprioception  -improved muscle strength  -improved motor coordination

## 2022-09-25 NOTE — PLAN OF CARE
Goal Outcome Evaluation:  FOCUS/GOAL  Bowel management, Medication management, and Medical management    ASSESSMENT, INTERVENTIONS AND CONTINUING PLAN FOR GOAL:  Patient is alert/oriented, is using call light appropriately on this shift.  Patient has denied any pain, VSS, had a continent bm yesterday after suppository and took scheduled Miralax. May benefit from scheduled stool softener instead of or with Miralax as has required supp for the last two bowel movements, will leave sticky note.  No additional care concerns, continue with POC.

## 2022-09-26 ENCOUNTER — APPOINTMENT (OUTPATIENT)
Dept: OCCUPATIONAL THERAPY | Facility: CLINIC | Age: 67
DRG: 056 | End: 2022-09-26
Attending: PHYSICAL MEDICINE & REHABILITATION
Payer: COMMERCIAL

## 2022-09-26 ENCOUNTER — APPOINTMENT (OUTPATIENT)
Dept: SPEECH THERAPY | Facility: CLINIC | Age: 67
DRG: 056 | End: 2022-09-26
Attending: PHYSICAL MEDICINE & REHABILITATION
Payer: COMMERCIAL

## 2022-09-26 ENCOUNTER — APPOINTMENT (OUTPATIENT)
Dept: PHYSICAL THERAPY | Facility: CLINIC | Age: 67
DRG: 056 | End: 2022-09-26
Attending: PHYSICAL MEDICINE & REHABILITATION
Payer: COMMERCIAL

## 2022-09-26 LAB
BASOPHILS # BLD AUTO: 0 10E3/UL (ref 0–0.2)
BASOPHILS NFR BLD AUTO: 0 %
EOSINOPHIL # BLD AUTO: 0.4 10E3/UL (ref 0–0.7)
EOSINOPHIL NFR BLD AUTO: 5 %
ERYTHROCYTE [DISTWIDTH] IN BLOOD BY AUTOMATED COUNT: 13.2 % (ref 10–15)
HCT VFR BLD AUTO: 41.6 % (ref 35–47)
HGB BLD-MCNC: 13.7 G/DL (ref 11.7–15.7)
IMM GRANULOCYTES # BLD: 0 10E3/UL
IMM GRANULOCYTES NFR BLD: 0 %
LYMPHOCYTES # BLD AUTO: 1 10E3/UL (ref 0.8–5.3)
LYMPHOCYTES NFR BLD AUTO: 12 %
MCH RBC QN AUTO: 28.8 PG (ref 26.5–33)
MCHC RBC AUTO-ENTMCNC: 32.9 G/DL (ref 31.5–36.5)
MCV RBC AUTO: 87 FL (ref 78–100)
MONOCYTES # BLD AUTO: 0.6 10E3/UL (ref 0–1.3)
MONOCYTES NFR BLD AUTO: 7 %
NEUTROPHILS # BLD AUTO: 6.7 10E3/UL (ref 1.6–8.3)
NEUTROPHILS NFR BLD AUTO: 76 %
NRBC # BLD AUTO: 0 10E3/UL
NRBC BLD AUTO-RTO: 0 /100
PLATELET # BLD AUTO: 360 10E3/UL (ref 150–450)
RBC # BLD AUTO: 4.76 10E6/UL (ref 3.8–5.2)
WBC # BLD AUTO: 8.8 10E3/UL (ref 4–11)

## 2022-09-26 PROCEDURE — 97110 THERAPEUTIC EXERCISES: CPT | Mod: GO

## 2022-09-26 PROCEDURE — 250N000011 HC RX IP 250 OP 636

## 2022-09-26 PROCEDURE — 250N000013 HC RX MED GY IP 250 OP 250 PS 637: Performed by: STUDENT IN AN ORGANIZED HEALTH CARE EDUCATION/TRAINING PROGRAM

## 2022-09-26 PROCEDURE — 250N000013 HC RX MED GY IP 250 OP 250 PS 637: Performed by: INTERNAL MEDICINE

## 2022-09-26 PROCEDURE — 97130 THER IVNTJ EA ADDL 15 MIN: CPT | Mod: GN

## 2022-09-26 PROCEDURE — 97110 THERAPEUTIC EXERCISES: CPT | Mod: GP

## 2022-09-26 PROCEDURE — 97530 THERAPEUTIC ACTIVITIES: CPT | Mod: GO

## 2022-09-26 PROCEDURE — 85025 COMPLETE CBC W/AUTO DIFF WBC: CPT

## 2022-09-26 PROCEDURE — 97129 THER IVNTJ 1ST 15 MIN: CPT | Mod: GN

## 2022-09-26 PROCEDURE — 250N000013 HC RX MED GY IP 250 OP 250 PS 637

## 2022-09-26 PROCEDURE — 128N000003 HC R&B REHAB

## 2022-09-26 PROCEDURE — 97535 SELF CARE MNGMENT TRAINING: CPT | Mod: GO

## 2022-09-26 PROCEDURE — 250N000013 HC RX MED GY IP 250 OP 250 PS 637: Performed by: PHYSICAL MEDICINE & REHABILITATION

## 2022-09-26 PROCEDURE — 99232 SBSQ HOSP IP/OBS MODERATE 35: CPT | Mod: GC | Performed by: PHYSICAL MEDICINE & REHABILITATION

## 2022-09-26 PROCEDURE — 36415 COLL VENOUS BLD VENIPUNCTURE: CPT

## 2022-09-26 PROCEDURE — 97530 THERAPEUTIC ACTIVITIES: CPT | Mod: GP

## 2022-09-26 RX ORDER — AMOXICILLIN 250 MG
1-4 CAPSULE ORAL 2 TIMES DAILY
Status: DISCONTINUED | OUTPATIENT
Start: 2022-09-26 | End: 2022-09-26

## 2022-09-26 RX ORDER — AMLODIPINE BESYLATE 5 MG/1
5 TABLET ORAL DAILY
Status: DISCONTINUED | OUTPATIENT
Start: 2022-09-27 | End: 2022-09-30 | Stop reason: HOSPADM

## 2022-09-26 RX ORDER — AMOXICILLIN 250 MG
2 CAPSULE ORAL 2 TIMES DAILY
Status: DISCONTINUED | OUTPATIENT
Start: 2022-09-26 | End: 2022-09-27

## 2022-09-26 RX ADMIN — ATORVASTATIN CALCIUM 80 MG: 80 TABLET, FILM COATED ORAL at 21:05

## 2022-09-26 RX ADMIN — POLYETHYLENE GLYCOL 3350 17 G: 17 POWDER, FOR SOLUTION ORAL at 08:17

## 2022-09-26 RX ADMIN — Medication 5 MG: at 21:05

## 2022-09-26 RX ADMIN — HEPARIN SODIUM 5000 UNITS: 5000 INJECTION, SOLUTION INTRAVENOUS; SUBCUTANEOUS at 21:05

## 2022-09-26 RX ADMIN — PANTOPRAZOLE SODIUM 40 MG: 40 TABLET, DELAYED RELEASE ORAL at 08:16

## 2022-09-26 RX ADMIN — ASPIRIN 81 MG: 81 TABLET, COATED ORAL at 08:17

## 2022-09-26 RX ADMIN — SENNOSIDES AND DOCUSATE SODIUM 2 TABLET: 8.6; 5 TABLET ORAL at 21:05

## 2022-09-26 RX ADMIN — LISINOPRIL 30 MG: 20 TABLET ORAL at 21:05

## 2022-09-26 RX ADMIN — Medication 25 MG: at 21:05

## 2022-09-26 RX ADMIN — HEPARIN SODIUM 5000 UNITS: 5000 INJECTION, SOLUTION INTRAVENOUS; SUBCUTANEOUS at 08:17

## 2022-09-26 RX ADMIN — CLOPIDOGREL BISULFATE 75 MG: 75 TABLET, FILM COATED ORAL at 08:17

## 2022-09-26 RX ADMIN — LETROZOLE 2.5 MG: 2.5 TABLET, FILM COATED ORAL at 21:05

## 2022-09-26 RX ADMIN — SENNOSIDES AND DOCUSATE SODIUM 2 TABLET: 8.6; 5 TABLET ORAL at 08:17

## 2022-09-26 RX ADMIN — AMLODIPINE BESYLATE 10 MG: 10 TABLET ORAL at 08:16

## 2022-09-26 ASSESSMENT — ACTIVITIES OF DAILY LIVING (ADL)
ADLS_ACUITY_SCORE: 48
ADLS_ACUITY_SCORE: 49
ADLS_ACUITY_SCORE: 49
ADLS_ACUITY_SCORE: 48
ADLS_ACUITY_SCORE: 52
ADLS_ACUITY_SCORE: 48
ADLS_ACUITY_SCORE: 49
ADLS_ACUITY_SCORE: 48
ADLS_ACUITY_SCORE: 49
ADLS_ACUITY_SCORE: 49

## 2022-09-26 NOTE — PLAN OF CARE
Discharge Planner Post-Acute Rehab PT:      Discharge Plan: Home WC-based with  and son assist, 2 DAVE without rail (plan for ramp). Home care PT.     Precautions: Falls, L hemiplegia, dysphagia, breast CA (cleared for NMES)     Current Status:  Bed Mobility: SBA rolling and supine<>sit, cues for attn to L side  Transfer: Ax1 SaraStedy with nsg. CGA squat/stand pivot to the R, min/mod A to the L.  Gait: up to 20 ft, mod A for trunk and L LE mgmt at R rail + WC follow  Stairs: not appropriate at this time  Balance: PASS on 9/6: 13/36                            on 9/13: 21/36                            On 9/23: 23/36              Bell on 9/23: 12/56     Assessment: Pt continues to complain of ongoing sxs of dizziness despite BP being 134/68 mmHg. Pt asked to reduce workload this PM as a result. Despite this, pt is continuing to demonstrate improvement with transfers, Min A for SPT. May be ready to advance with nursing if sxs resolve.      Other Barriers to Discharge (DME, Family Training, etc):   Equipment: Plan to issue gait belt and L Eunice Neurexa. Ramp resources handout provided.  L AFO received/fitted from O&P.  WC eval completed 9/22 (K4+L arm tray)  Family training completed 9/22, with plan for additional session prior to discharge

## 2022-09-26 NOTE — PLAN OF CARE
FOCUS/GOAL  Wound care management, Medical management, Mobility, Skin integrity, and Psychosocial needs    ASSESSMENT, INTERVENTIONS AND CONTINUING PLAN FOR GOAL:    Goal Outcome Evaluation:       Pt Aox4 this shift. Pleasant and cooperative. No concerns for nurse at this time. Calls appropriately. Needs in reach and safety measures in place. Cont POC  Mobility: A1SS  Bowel/Bladder: continent, LBM yest  Skin: L side wound on breast.  O2: RA  Diet: R/T/W  Psychosocial: appropriate to situation  Pain: denies  Tubes/Lines/Drains:   Misc: No alarms  Chemo precautions

## 2022-09-26 NOTE — PLAN OF CARE
"Discharge Planner Post-Acute Rehab SLP:     Discharge Plan:  SLP    Precautions: aspiration, fall    Current Status:  Communication: Word finding difficulties in conversation   Cognition: Mild-mod cognitive impairments with deficits re: memory, attention, reasoning, language. Benefits from extra time   Swallow: Regular/thin (0) liquid. Fully upright, straws okay, small single bites and sips.    Assessment:  Pt performed information processing/thought organization tasks x 70% accuracy independently.  Mn cues for attention to detail, which improved performance to 100% accuracy.  Pt performed a visually presented problem solving/planning task x 80% with occasional cues.  Pt reports feeling \"slower\" in processing this date, but it did not affect performance significantly.  At the end of the session, pt reported feeling light headed and asked for food.  Nursing was notified.  Pt was provided with crackers per request.    Other Barriers to Discharge (Family Training, etc): family training education and IADL support   "

## 2022-09-26 NOTE — CARE PLAN
FOCUS/GOAL  Bowel management, Bladder management, Mobility, and Skin integrity     ASSESSMENT, INTERVENTIONS AND CONTINUING PLAN FOR GOAL:     Pt is A&Ox4. Uses call light to make needs known. Denies pain. Dysarthria continues. LUE flaccid. Reg/Thin/Whole pills. Ax1 w/ ubaldo stedy to toilet. Continent of B&B, LBM: 9/24/2022. L. Breast mass care completed and new dressing applied, CDI. VSS. Will continue w/ poc.

## 2022-09-26 NOTE — PLAN OF CARE
FOCUS/GOAL  Bowel management, Bladder management, and Pain management    ASSESSMENT, INTERVENTIONS AND CONTINUING PLAN FOR GOAL:  Pt is alert and oriented x4, using calling light and able to communicate needs. Denies pain. Continent of bladder. Slept well during the overnight. Continue with plan of care    Goal Outcome Evaluation:

## 2022-09-26 NOTE — PLAN OF CARE
Goal Outcome Evaluation:        Patient alert and oriented x4. A1 ubaldo steady. Continent of bowel and bladder, LBM: 9/25. Chemo precautions maintained. Regular diet, thins, pills whole. L chest mass covered, no active bleeding today, dressing CDI. Pt reports feeling dizzy, rest and fluids promoted, sticky note left for provider for prn medications. VSS. Pt denies pain during shift. Will continue with POC.

## 2022-09-26 NOTE — PROGRESS NOTES
fDischarge Planner Post-Acute Rehab OT:      Discharge Plan: home with spouse and adult son A, HC OT     Precautions: falls, dense L jong, monitor BP, LUE omoneurexa OOB for LUE mgmt, breast cancer     Current Status:  ADLs:    Mobility: Ax1 SaraStedy. Squat/stand pivot min Ax1 to R side, mod A L, continue to have pt self initiate set up. Recommend Ax1 SaraStedy with nsg. Ok to sit EOB independently - no alarms.    Grooming: SBA EOB wash face, Mod A wash hands. Set up/clean up oral cares.    Dressing: UB- Mod A shirt and LUE yoana neurexa. LB - Max A shorts rolling and reacher. Feet - Don socks SBA RLE, A to hold LLE in fig four. SBA don RLE shoe, Max A don LLE AFO and shoe.    Bathing: Max A sponge bathing. Transfer N/A d/t isolation precautions upon arrival. Need to reassess now off iso.    Toileting: Transfer SaraStedy <> commode overlay. Total A cares and clothing mgmt.   IADLs: IND PTA. Anticipate A at discharge.  Vision/Cognition: L strabismus baseline with pt reporting, pt reports worse vision in L eye at baseline. Functional deficits problem solving, memory, and wordfinding.     Assessment: Pt continues to require assist with setup of w/c for transfers including brake management and L footrest reminders. Will benefit from further assessment for nursing to begin squat pivot transfers. Initiated SROM HEP for LUE. Modified second OT session to bed only d/t pt c/o dizziness and overall not feeling well.       Other Barriers to Discharge (DME, Family Training, etc): 1 stair to enter from garage, then all needs met on main level. Walk in shower with high lip with showerhead on R wall and vinyl inlay (not able to apply stud gbs). Standard height toilet without wall to secure gb. Need to reassess/problem solve walk-in shower and toilet transfers simulating home environment as s/u not conducive for gbs.      DME: Recommend ETB, BSC/OTC, reacher, yoana neurexa, temporary bed rail, bidet, suction gb shower, [facility]. Family  provided handouts.     Family training - Thursday 9/22 PM with sons and spouse completed. Anticipate additional training week of discharge, will follow up with family after Tuesday rounds.    Skilled set up on :  Pt dependent for proper placement of electrodes on LUE for optimum muscle contraction, safe positioning on w/c within frame and cushion for prevention of skin breakdown, UE secured to arm support.  Passive motion assessed to ensure proper positioning.       Pt performed 30 minutes of active FES ergometry with 0-100% stimulation applied to above muscles at 30 rpm with 0.50nm resistance.  This OT adjusted e-stim and cycling parameters in real-time to ensure palpable muscle contractions throughout session.  Please see [email or web address] for further details on patient's stimulation parameters and ergometry outcomes.    Changes in parameters that were part of this treatment:   -intensity     Functional outcomes from this intervention include:   -reduced spasticity  -improved sensory awareness and proprioception  -improved muscle strength  -improved motor coordination

## 2022-09-26 NOTE — PROGRESS NOTES
"  [facility]   Acute Rehabilitation Unit  Daily progress note    INTERVAL HISTORY  Stephani Garcia was seen and examined at bedside.  No acute events reported overnight.  Pt doing well this morning.  No questions or concerns today.  Patient denies headache, fever, chills, shortness of breath, chest pain, abdominal pain, nausea, vomiting, and diarrhea.    Functionally, Patient continues to be a full participant with therapies.  With physical therapy pt focusing on independence w/ bed mobility and squat pivot transfers to right and left sides.  With occupational therapy pt worked w/ FES for LUE neuro re-education and sensorimotor skills.  With SLP pt performed moderate complex information processing/organization tasks w/ 70% accuracy.     MEDICATIONS    amLODIPine  10 mg Oral Daily     aspirin  81 mg Oral Daily     atorvastatin  80 mg Oral QPM     clopidogrel  75 mg Oral Daily     heparin ANTICOAGULANT  5,000 Units Subcutaneous Q12H     letrozole  2.5 mg Oral At Bedtime     lisinopril  30 mg Oral At Bedtime     melatonin  5 mg Oral At Bedtime     pantoprazole  40 mg Oral QAM AC     polyethylene glycol  17 g Oral Daily     senna-docusate  2 tablet Oral BID     traZODone  25 mg Oral At Bedtime        acetaminophen, bisacodyl, hydrALAZINE     PHYSICAL EXAM  /67 (BP Location: Right arm, Patient Position: Supine)   Pulse 99   Temp (!) 96.2  F (35.7  C) (Oral)   Resp 16   Ht 1.702 m (5' 7\")   Wt 75.8 kg (167 lb)   SpO2 96%   BMI 26.16 kg/m    General: No acute distress, sitting EOB eating breakfst  HEENT: NC/NT, Moist mucous membranes  Pulmonary: Breathing comfortably on room air, clear to auscultation bilaterally  Cardiovascular: Regular rate and rhythm  Abdominal: Non-tender, non-distended, bowel sounds present in all four quadrants   Extremities: warm, well perfused, no edema in bilateral lower extremities  Neuro/MSK: Alert and oriented, hearing intact to conversation, " moving right upper extremity greater than antigravity    LABS  Recent Results (from the past 24 hour(s))   CBC with platelets and differential    Collection Time: 09/26/22  9:29 AM   Result Value Ref Range    WBC Count 8.8 4.0 - 11.0 10e3/uL    RBC Count 4.76 3.80 - 5.20 10e6/uL    Hemoglobin 13.7 11.7 - 15.7 g/dL    Hematocrit 41.6 35.0 - 47.0 %    MCV 87 78 - 100 fL    MCH 28.8 26.5 - 33.0 pg    MCHC 32.9 31.5 - 36.5 g/dL    RDW 13.2 10.0 - 15.0 %    Platelet Count 360 150 - 450 10e3/uL    % Neutrophils 76 %    % Lymphocytes 12 %    % Monocytes 7 %    % Eosinophils 5 %    % Basophils 0 %    % Immature Granulocytes 0 %    NRBCs per 100 WBC 0 <1 /100    Absolute Neutrophils 6.7 1.6 - 8.3 10e3/uL    Absolute Lymphocytes 1.0 0.8 - 5.3 10e3/uL    Absolute Monocytes 0.6 0.0 - 1.3 10e3/uL    Absolute Eosinophils 0.4 0.0 - 0.7 10e3/uL    Absolute Basophils 0.0 0.0 - 0.2 10e3/uL    Absolute Immature Granulocytes 0.0 <=0.4 10e3/uL    Absolute NRBCs 0.0 10e3/uL       Rehabilitation - continue comprehensive acute inpatient rehabilitation program with multidisciplinary approach including therapies, rehab nursing, and physiatry following. See interval history for updates.      ASSESSMENT AND PLAN  Stephani Garcia is a 67 year old left hand dominant female with PMHx HTN, HLD, vertigo, GILDA (not using CPAP). Admitted for acute ischemic infarcts with right frontal lobe and right basal ganglia involvement. Has functional deficits of weak L. shoulder shrug, L. Hemiparesis, dysphagia, dysarthria, possbile neurogenic bowel/bladder and cognitive deficits. She also has possible L. Breast CA pending pathology results. She is admitted to undergo therapies with PT/OT/SLP.     Admission to acute inpatient rehab: 9/5/22  Impairment group code: 01.1  Stroke Ischemic  (L) Body Involvement (R) Brain: small cluster of recent infarcts in the right frontal lobe and right basal ganglia.       # Acute ischemic stroke   # Recent infarcts in the right  "frontal lobe and right basal ganglia  Noted left-sided partial hemiparesis and mild dysarthria/expressive aphasia at presentation 8/30. MRI confirmed  Acute ischemic stroke, and TPA/ thrombectomy 2/2 unknown last known normal. CTA showed no large vessel occlusion, with only 30% of ICA occlusion. Echo was normal EF 65% on 8/31. No Afib noted with telemetry.  at baseline. BP goal is <140/90 at rest, anticipate increase with therapies.   - Plavix 75 mg and aspirin 81 mg until 11/30   -aspirin 325mg starting 12/1  -  Atorvastatin 80 mg    - PT/OT/SLP     # Dysphagia    Video swallow study on 9/2/22: \"Patient had direct, silent aspiration with mildly thick liquid on first trial and deep penetration with mildly thick liquids on subsequent trials. No aspiration or penetration with moderately thick liquids.   - Continue dysphagia diet with moderately thick fluids  - SLP  - repeat swallow study for 9/20 - Pt presents with significant improved oropharyngeal swallow function and demonstrated no aspiration across all consistencies. Pt with few instances reduced laryngeal vestibule closure resulting in intermittent flash penetration with mildly thick (2) and large sips thin (0) liquid. Recommend pt continue regular texture, advance to level 0 liquid. straws ok. Ensure upright fully with all PO.     # Left Breast Ductal Carcinoma   FNA 9/2. There is a high suspicion for breast CA. Surgery rec'd neoadjuvent treatment with hopes of shrinking tumor prior to surgery. Unable to surgically operate due to recent stroke  - Needs referral to oncology for outpatient follow-up.  --9/7 biopsy results: ductal carcinoma and estrogen and progesterone positive.     - [facility]  - [facility] consult: appreciate recs 9/6   -Letrozole 2.5 mg daily     #UTI - Resolved  -urine from 9/19 showed WBCs, leukocyte esterase, mucus  -Pt reports abdominal pain, dysuria, similar to previous UTIs  -Macrobid BID for 5 days, ended " 9/24     # Hyperlipidemia    at baseline.  - Atorvastatin 80 mg      # Hypertension  Goal BP <140/90 at rest.  - Lisinopril 30 mg daily  - Amlodipine 10 mg Daily 9/15  - Hydralazine 10 mg PRN for SBP over 170     # Neurogenic Bladder  - Timed voiding  - purewick HS     # Bowel   - Miralax daily, senokot BID  - PRN bisacodyl suppository     # Sleep   - Melatonin 5 mg at bedtime  - Trazodone 25 mg at bedtime     # Asymptomatic COVID 19 Infection - Resolved  Positive Covid test on 8/3 with preceding cold symptoms for 3 days prior. Currently she continues remain asymptomatic from COVID symptoms on 9/5. She has no cough, wheezing, SOB, fever, chills, body aches or any type of cold symptoms.  She has completed remdesivir x 3 days.  Off of quarantine     #GILDA (obstructive sleep apnea):  Patient does not use CPAP at baseline. May need repeat sleep study and eval by sleep medicine team.   - encourage using CPAP         6. Adjustment to disability:  Clinical psychology to eval and treat, seen 9/13  7. FEN: Regular Diet Adult Liquidized/Moderately Thick (level 3)  8. Bowel: Scheduled Senokot & Miralax  9. Bladder: Timed voiding  10. DVT Prophylaxis: Heparin 5000 units for DVT ppx. Plavix 75 mg daily and ASA 81 mg 90 days then ASA alone.   11. GI Prophylaxis: Pantoprazole 40 mg   12. Code: Full  13. Disposition: Home  14. ELOS:  9/30/2022  15. Rehab prognosis:  Fair  16. Follow up Appointments on Discharge:   - PCP follow up 7-14 days post discharge - needs to establish with new PCP  - Follow-up with neurology in 6 to 8 weeks  - Referral for oncology given biopsy results   - GILDA: outpatient referral to sleep medicine clinic if agreeable      Patient seen and discussed with Dr. Mracos, PM&R Staff Physician    Jam Borden DO  PGY4 PM&R Resident

## 2022-09-27 ENCOUNTER — APPOINTMENT (OUTPATIENT)
Dept: OCCUPATIONAL THERAPY | Facility: CLINIC | Age: 67
DRG: 056 | End: 2022-09-27
Attending: PHYSICAL MEDICINE & REHABILITATION
Payer: COMMERCIAL

## 2022-09-27 ENCOUNTER — APPOINTMENT (OUTPATIENT)
Dept: PHYSICAL THERAPY | Facility: CLINIC | Age: 67
DRG: 056 | End: 2022-09-27
Attending: PHYSICAL MEDICINE & REHABILITATION
Payer: COMMERCIAL

## 2022-09-27 ENCOUNTER — APPOINTMENT (OUTPATIENT)
Dept: SPEECH THERAPY | Facility: CLINIC | Age: 67
DRG: 056 | End: 2022-09-27
Attending: PHYSICAL MEDICINE & REHABILITATION
Payer: COMMERCIAL

## 2022-09-27 PROCEDURE — 999N000125 HC STATISTIC PATIENT MED CONFERENCE < 30 MIN

## 2022-09-27 PROCEDURE — 250N000011 HC RX IP 250 OP 636

## 2022-09-27 PROCEDURE — 250N000013 HC RX MED GY IP 250 OP 250 PS 637

## 2022-09-27 PROCEDURE — 128N000003 HC R&B REHAB

## 2022-09-27 PROCEDURE — 97535 SELF CARE MNGMENT TRAINING: CPT | Mod: GO

## 2022-09-27 PROCEDURE — 97130 THER IVNTJ EA ADDL 15 MIN: CPT | Mod: GN

## 2022-09-27 PROCEDURE — 250N000013 HC RX MED GY IP 250 OP 250 PS 637: Performed by: STUDENT IN AN ORGANIZED HEALTH CARE EDUCATION/TRAINING PROGRAM

## 2022-09-27 PROCEDURE — 250N000013 HC RX MED GY IP 250 OP 250 PS 637: Performed by: INTERNAL MEDICINE

## 2022-09-27 PROCEDURE — 97530 THERAPEUTIC ACTIVITIES: CPT | Mod: GP | Performed by: PHYSICAL THERAPIST

## 2022-09-27 PROCEDURE — 97129 THER IVNTJ 1ST 15 MIN: CPT | Mod: GN

## 2022-09-27 PROCEDURE — 97535 SELF CARE MNGMENT TRAINING: CPT | Mod: GO | Performed by: OCCUPATIONAL THERAPIST

## 2022-09-27 PROCEDURE — 97110 THERAPEUTIC EXERCISES: CPT | Mod: GP | Performed by: PHYSICAL THERAPIST

## 2022-09-27 PROCEDURE — 99233 SBSQ HOSP IP/OBS HIGH 50: CPT | Performed by: PHYSICAL MEDICINE & REHABILITATION

## 2022-09-27 PROCEDURE — 999N000150 HC STATISTIC PT MED CONFERENCE < 30 MIN: Performed by: PHYSICAL THERAPIST

## 2022-09-27 PROCEDURE — 250N000013 HC RX MED GY IP 250 OP 250 PS 637: Performed by: PHYSICAL MEDICINE & REHABILITATION

## 2022-09-27 RX ORDER — POLYETHYLENE GLYCOL 3350 17 G/17G
17 POWDER, FOR SOLUTION ORAL DAILY PRN
Status: DISCONTINUED | OUTPATIENT
Start: 2022-09-27 | End: 2022-09-30 | Stop reason: HOSPADM

## 2022-09-27 RX ORDER — AMOXICILLIN 250 MG
2 CAPSULE ORAL 2 TIMES DAILY PRN
Status: DISCONTINUED | OUTPATIENT
Start: 2022-09-27 | End: 2022-09-30 | Stop reason: HOSPADM

## 2022-09-27 RX ADMIN — SENNOSIDES AND DOCUSATE SODIUM 2 TABLET: 8.6; 5 TABLET ORAL at 08:10

## 2022-09-27 RX ADMIN — Medication 25 MG: at 20:02

## 2022-09-27 RX ADMIN — HEPARIN SODIUM 5000 UNITS: 5000 INJECTION, SOLUTION INTRAVENOUS; SUBCUTANEOUS at 20:02

## 2022-09-27 RX ADMIN — HEPARIN SODIUM 5000 UNITS: 5000 INJECTION, SOLUTION INTRAVENOUS; SUBCUTANEOUS at 08:10

## 2022-09-27 RX ADMIN — ASPIRIN 81 MG: 81 TABLET, COATED ORAL at 08:10

## 2022-09-27 RX ADMIN — LISINOPRIL 30 MG: 20 TABLET ORAL at 20:02

## 2022-09-27 RX ADMIN — AMLODIPINE BESYLATE 5 MG: 5 TABLET ORAL at 08:10

## 2022-09-27 RX ADMIN — ATORVASTATIN CALCIUM 80 MG: 80 TABLET, FILM COATED ORAL at 20:02

## 2022-09-27 RX ADMIN — Medication 5 MG: at 20:02

## 2022-09-27 RX ADMIN — LETROZOLE 2.5 MG: 2.5 TABLET, FILM COATED ORAL at 20:02

## 2022-09-27 RX ADMIN — PANTOPRAZOLE SODIUM 40 MG: 40 TABLET, DELAYED RELEASE ORAL at 08:10

## 2022-09-27 RX ADMIN — CLOPIDOGREL BISULFATE 75 MG: 75 TABLET, FILM COATED ORAL at 08:10

## 2022-09-27 ASSESSMENT — ACTIVITIES OF DAILY LIVING (ADL)
ADLS_ACUITY_SCORE: 52
ADLS_ACUITY_SCORE: 48

## 2022-09-27 NOTE — PROGRESS NOTES
On track and targeting discharge Friday 09/30/2022. SWer spoke with pt. Pt in agreement with plan. Pt reminded of HC set up, SOC, and contact information. Pt denied concerns. Asked pt about HCD, pt has not completed and prefers to take home. Offered mobile notary list, pt declined and stated that her son can assist her. Asked about pt plan to apply for Medicare and custodial. Pt has not done this yet. Offered referral to [employer] [facility] for support and assistance, pt in agreement. Contact information for [employer] [facility] added to AVS. Referral completed today on pt behalf ([id number]). SW will send orders and ppwk to HC on day of discharge. Pt denied any concerns or questions at this time. No other SW needs at this time.     Per [facility] Int[facility] (pt aware and in agreement): [facility], Ok to accept. Member would be 100% responsible for services until satisfying deductible, $250 remaining. Once met, member would have a 5% coinsurance per visit. MSW not covered.     Pt does not have a regular PCP. HUC will assist and get pt set up with PCP at discharge. ARU MD to sign HC orders until PCP established and notified.      Home Health Care:   [facility]  PH: [phone], Fax: [phone]   RN, PT, OT, SLP, PAOLA Barkley, [facility] Acute Rehab   Direct Phone: [phone]  I   Pager: [phone]  I  Fax: [phone]

## 2022-09-27 NOTE — PROGRESS NOTES
[facility]   Acute Rehabilitation Unit  Daily progress note    INTERVAL HISTORY  Stephani Garcia was seen and examined at bedside.  No acute events overnight.  Denies chest pain, shortness of breath, no fever or chills.  Less dizzy today and BP stable on current medication.  Does have some loose stool, will cut back on bowel medications.  Plan is to set up for discharge at end of the week.    Functional  OT:  ADLs:    Mobility: Ax1 squat pivot both directions, use of quad cane to assist to L initiated on 9/28 per PT. Recommend Ax1 squat pivot with nsg, easiest to right. Ok to sit EOB independently - no alarms.    Grooming: IND with set up/clean up EOS simple g/h and oral cares. A for hair.     Dressing: UB- IND with set up doff/don pull over shirt. Mod A LUE yoana neurexa. LB - Max A don/doff over hips, SBA thread/unthread. Feet - IND with set up don/doff socks. SBA don RLE shoe with elastic laces. Max A don LLE AFO and shoe.    Bathing: Max A sponge bathing. Transfer N/A d/t isolation precautions upon arrival. Need to reassess now off iso.    Toileting: Squat pivot with grab rail wc <> commode overlay. Total A cares and clothing mgmt standing with gb.   IADLs: IND PTA. Anticipate A at discharge.  Vision/Cognition: L strabismus baseline with pt reporting, pt reports worse vision in L eye at baseline. Functional deficits problem solving, memory, and wordfinding.      ROS: 10 point ROS neg other than the symptoms noted above in the HPI.      MEDICATIONS    amLODIPine  5 mg Oral Daily     aspirin  81 mg Oral Daily     atorvastatin  80 mg Oral QPM     clopidogrel  75 mg Oral Daily     heparin ANTICOAGULANT  5,000 Units Subcutaneous Q12H     letrozole  2.5 mg Oral At Bedtime     lisinopril  30 mg Oral At Bedtime     melatonin  5 mg Oral At Bedtime     pantoprazole  40 mg Oral QAM AC     traZODone  25 mg Oral At Bedtime        acetaminophen, bisacodyl, hydrALAZINE, polyethylene  "glycol, senna-docusate     PHYSICAL EXAM  /61 (BP Location: Right arm)   Pulse 88   Temp (!) 96.4  F (35.8  C) (Oral)   Resp 16   Ht 1.702 m (5' 7\")   Wt 75.8 kg (167 lb)   SpO2 94%   BMI 26.16 kg/m    General: No acute distress, resting in bed   HEENT: NC/NT, Moist mucous membranes  Pulmonary: Breathing comfortably on room air, clear to auscultation bilaterally  Cardiovascular: Regular rate and rhythm  Abdominal: Non-tender, non-distended, bowel sounds present in all four quadrants   Extremities: warm, well perfused, no edema in bilateral lower extremities  Neuro/MSK: Alert and oriented, hearing intact to conversation, moving right upper extremity greater than antigravity    LABS  No results found for this or any previous visit (from the past 24 hour(s)).    Rehabilitation - continue comprehensive acute inpatient rehabilitation program with multidisciplinary approach including therapies, rehab nursing, and physiatry following. See interval history for updates.      ASSESSMENT AND PLAN  Stephani Garcia is a 67 year old left hand dominant female with PMHx HTN, HLD, vertigo, GILDA (not using CPAP). Admitted for acute ischemic infarcts with right frontal lobe and right basal ganglia involvement. Has functional deficits of weak L. shoulder shrug, L. Hemiparesis, dysphagia, dysarthria, possbile neurogenic bowel/bladder and cognitive deficits. She also has possible L. Breast CA pending pathology results. She is admitted to undergo therapies with PT/OT/SLP.     Admission to acute inpatient rehab: 9/5/22  Impairment group code: 01.1  Stroke Ischemic  (L) Body Involvement (R) Brain: small cluster of recent infarcts in the right frontal lobe and right basal ganglia.       # Acute ischemic stroke   # Recent infarcts in the right frontal lobe and right basal ganglia  Noted left-sided partial hemiparesis and mild dysarthria/expressive aphasia at presentation 8/30. MRI confirmed  Acute ischemic stroke, and " "TPA/ thrombectomy 2/2 unknown last known normal. CTA showed no large vessel occlusion, with only 30% of ICA occlusion. Echo was normal EF 65% on 8/31. No Afib noted with telemetry.  at baseline. BP goal is <140/90 at rest, anticipate increase with therapies.   - Plavix 75 mg and aspirin 81 mg until 11/30   -aspirin 325mg starting 12/1  -  Atorvastatin 80 mg    - PT/OT/SLP     # Dysphagia    Video swallow study on 9/2/22: \"Patient had direct, silent aspiration with mildly thick liquid on first trial and deep penetration with mildly thick liquids on subsequent trials. No aspiration or penetration with moderately thick liquids.   - Continue dysphagia diet with moderately thick fluids  - SLP  - repeat swallow study for 9/20 - Pt presents with significant improved oropharyngeal swallow function and demonstrated no aspiration across all consistencies. Pt with few instances reduced laryngeal vestibule closure resulting in intermittent flash penetration with mildly thick (2) and large sips thin (0) liquid. Recommend pt continue regular texture, advance to level 0 liquid. straws ok. Ensure upright fully with all PO.     # Left Breast Ductal Carcinoma   FNA 9/2. There is a high suspicion for breast CA. Surgery rec'd neoadjuvent treatment with hopes of shrinking tumor prior to surgery. Unable to surgically operate due to recent stroke  - Needs referral to oncology for outpatient follow-up.  --9/7 biopsy results: ductal carcinoma and estrogen and progesterone positive.     - [facility]  - [facility] consult: appreciate recs 9/6   -Letrozole 2.5 mg daily     #UTI - Resolved  -urine from 9/19 showed WBCs, leukocyte esterase, mucus  -Pt reports abdominal pain, dysuria, similar to previous UTIs  -Macrobid BID for 5 days, ended 9/24     # Hyperlipidemia    at baseline.  - Atorvastatin 80 mg      # Hypertension  Goal BP <140/90 at rest.  - Lisinopril 30 mg daily  - Amlodipine 10 mg Daily 9/15  - " Hydralazine 10 mg PRN for SBP over 170     # Neurogenic Bladder  - Timed voiding  - purewick HS     # Bowel   - Miralax daily, senokot BID  - PRN bisacodyl suppository     # Sleep   - Melatonin 5 mg at bedtime  - Trazodone 25 mg at bedtime     # Asymptomatic COVID 19 Infection - Resolved  Positive Covid test on 8/3 with preceding cold symptoms for 3 days prior. Currently she continues remain asymptomatic from COVID symptoms on 9/5. She has no cough, wheezing, SOB, fever, chills, body aches or any type of cold symptoms.  She has completed remdesivir x 3 days.  Off of quarantine     #GILDA (obstructive sleep apnea):  Patient does not use CPAP at baseline. May need repeat sleep study and eval by sleep medicine team.   - encourage using CPAP         6. Adjustment to disability:  Clinical psychology to eval and treat, seen 9/13  7. FEN: Regular Diet Adult Liquidized/Moderately Thick (level 3)  8. Bowel: Scheduled Senokot & Miralax  9. Bladder: Timed voiding  10. DVT Prophylaxis: Heparin 5000 units for DVT ppx. Plavix 75 mg daily and ASA 81 mg 90 days then ASA alone.   11. GI Prophylaxis: Pantoprazole 40 mg   12. Code: Full  13. Disposition: Home  14. ELOS:  9/30/2022  15. Rehab prognosis:  Fair  16. Follow up Appointments on Discharge:   - PCP follow up 7-14 days post discharge - needs to establish with new PCP  - Follow-up with neurology in 6 to 8 weeks  - Referral for oncology given biopsy results   - GILDA: outpatient referral to sleep medicine clinic if agreeable        Andrew Marcos DO        I spent a total of 35 minutes face to face and coordinating care of Stephani Garcia. Over 50% of my time on the unit was spent counseling the patient and /or coordinating care regarding post CVA.

## 2022-09-27 NOTE — PLAN OF CARE
Discharge Planner Post-Acute Rehab PT:      Discharge Plan: Home WC-based with  and son assist, 2 DAVE without rail (ramp to be installed 9/29). Home care PT.     Precautions: Falls, L hemiplegia, dysphagia, breast CA (cleared for NMES)     Current Status:  Bed Mobility: SBA rolling and supine<>sit, cues for attn to L side  Transfer: CGA squat/stand pivot to the R, min/mod A to the L (working with NBQC in therapies)  Gait: up to 20 ft, mod A for trunk and L LE mgmt at R rail + WC follow  Stairs: not appropriate at this time  Balance: PASS on 9/6: 13/36                            on 9/13: 21/36                            On 9/23: 23/36              Bell on 9/23: 12/56     Assessment: Son present for PM session to review safety with transfers WC<>EOB as well as in/out of car. Gaining safety and IND with stand pivots to the L with use of NBQC, but does require consistent cues for safe set-up and sequencing, just as with her squat pivot transfers.     Other Barriers to Discharge (DME, Family Training, etc):   Equipment: Plan to issue L Eunice Neurexa on 9/29.   Son to purchase gait belt and NBQC.  L AFO received/fitted from O&P.  WC eval completed 9/22 (K4+L arm tray)  Family training completed 9/22 and 9/27.

## 2022-09-27 NOTE — PLAN OF CARE
FOCUS/GOAL  Bowel management, Bladder management, Pain management and Cognition/Memory/Judgment/Problem solving    ASSESSMENT, INTERVENTIONS AND CONTINUING PLAN FOR GOAL:  Pt is alert and oriented x4, continent, denied pain, sob, fever, chills, n/v, or new numbness and tingling. No dizziness during transfers, assist of 1 ww to transfer, dressing intact to left chest. No further care concerns.     Goal Outcome Evaluation: No change

## 2022-09-27 NOTE — PROGRESS NOTES
fDischarge Planner Post-Acute Rehab OT:      Discharge Plan: home with spouse and adult son A, HC OT     Precautions: falls, dense L jong, monitor BP, LUE omoneurexa OOB for LUE mgmt, breast cancer - chemo precautions     Current Status:  ADLs:    Mobility: Ax1 squat pivot both directions, use of quad cane to assist to L initiated on 9/28 per PT. Recommend Ax1 squat pivot with nsg, easiest to right. Ok to sit EOB independently - no alarms.    Grooming: IND with set up/clean up EOS simple g/h and oral cares. A for hair.     Dressing: UB- IND with set up doff/don pull over shirt. Mod A LUE yoana neurexa. LB - Max A don/doff over hips, SBA thread/unthread. Feet - IND with set up don/doff socks. SBA don RLE shoe with elastic laces. Max A don LLE AFO and shoe.    Bathing: Max A sponge bathing. Transfer N/A d/t isolation precautions upon arrival. Need to reassess now off iso.    Toileting: Squat pivot with grab rail wc <> commode overlay. Total A cares and clothing mgmt standing with gb.   IADLs: IND PTA. Anticipate A at discharge.  Vision/Cognition: L strabismus baseline with pt reporting, pt reports worse vision in L eye at baseline. Functional deficits problem solving, memory, and wordfinding.     Assessment: Pt seen for interdisciplinary rounds, see POC note for details. Progressed to squat pivot transfers with nsg in collaboration with PT. Recommend drop arm gb at toilet, and vertical gb at left entrance of WIS with ETB in simulation of home environment bathroom transfers.      Other Barriers to Discharge (DME, Family Training, etc): 1 stair to enter from garage, then all needs met on main level. Walk in shower with high lip with showerhead on R wall and vinyl inlay (not able to apply stud gbs). Standard height toilet without wall to secure gb.      DME: Recommend ETB and vertical gb at left side of WIS entrance, BSC/OTC, drop arm grab bar at left side of toilet, yoana neurexa, temporary bed rail, bidet, HHSH. Mobility  devices per PT. Family provided handouts.     Family training - Thursday 9/22 PM with sons and spouse completed. Additional training Tuesday 9/27 PM.

## 2022-09-27 NOTE — PLAN OF CARE
Discharge Planner Post-Acute Rehab SLP:     Discharge Plan:  SLP    Precautions: aspiration, fall    Current Status:  Communication: Word finding difficulties in conversation   Cognition: Mild-mod cognitive impairments with deficits re: memory, attention, reasoning, language. Benefits from extra time   Swallow: Regular/thin (0) liquid. Fully upright, straws okay, small single bites and sips.    Assessment:  Pt expressed frustration with cognition. Pt participated in moderate level processing and problem solving tasks in the form of [place] blanca. Pt had slowed processing speed but got faster with each trial. Pt completed a problem solving task in the form of [employer] with consistent moderate cueing. Pt was able to complete task independently as task went on.    Other Barriers to Discharge (Family Training, etc): family training education and IADL support

## 2022-09-27 NOTE — CARE CONFERENCE
Acute Rehab Care Conference/Team Rounds    Type: Team Rounds    Present:  Dr. Andrew Marcos, Corinne Macias Resident MD, Gunner León PT, Richard Chris OT, Dakota Browne SLP, Yoselin Barkley [facility], Radha Mendez RD, Florina Goncalves RN, and Stephani Garcia Patient.     Discharge Barriers/Treatment/Education    Rehab Diagnosis:  Post CVA     Active Medical Co-morbidities/Prognosis:     Patient Active Problem List   Diagnosis Code     Acute ischemic stroke (H) I63.9     Hyperlipidemia E78.5     Hypertension I10     GILDA (obstructive sleep apnea) G47.33     Infarction of right basal ganglia (H) I63.9         Safety: Pt is alert and oriented x 4, calling for needs, no alarms, assist of 1 with ubaldo magdaleno.     Pain: Pt denies pain at this time    Medications, Skin, Tubes/Lines: Pt is taking medications whole with water. Would benefit from MAP to determine needs with medication administration. Dressing intact to L chest, no tubes/lines in place.     Swallowing/Nutrition: Diet has been advanced to regular textures and thin liquids. Do not anticipate ongoing intervention required upon facility discharge.     Bowel/Bladder: Pt is continent of bowl and bladder. LBM 9/25    Psychosocial: Lives in a two story townhouse with spouse. Indep PTA. No drive in last 30 years, family assists. No mental health or substance abuse reported. Good support from  and son, another son lives further away. Works for the [employer] as a . No financial concerns reported.     ADLs/IADLs: Pt progressing independence and safety in ADLs as demonstrated by improvement in sitting/standing balance during dressing and toileting routines. Progressed from SaraStedy to squat pivot transfers with nsg. Continues to require A d/t LUE hemiplegia limiting bilateral gross and fine motor coordination, balance deficits, and functional cognition deficits impairing consistent carryover of strategies. Plan to incorporate additional memory aids as appropriate. Family  training completed 9/22/22 with pt, spouse, and two sons. Recommend drop arm grab bar for toilet,  vertical gb at WIS entrance and ETB, temporary bed rail, bidet, HHSH, and BSC as commode overlay. Plan to continue focus on progressing safety and independence in functional transfers and personal cares to decrease caregiver burden. Pt on track to discharge home with family A and HCOT.     Mobility:   Bed Mobility: SBA rolling and supine<>sit, cues for attn to L side  Transfer: CGA squat/stand pivot to the R, min/mod A to the L.  Gait: up to 20 ft, mod A for trunk and L LE mgmt at R rail + WC follow  Stairs: not appropriate at this time  Balance: PASS on 9/6: 13/36                            on 9/13: 21/36                            On 9/23: 23/36              Bell on 9/23: 12/56  Continues to be demonstrating improvements in L-sided core and LE activation. Cognition remains a limiting factor for IND with transfers, with pt requiring repeat cueing for safe set-up and sequencing. WC evaluation has been completed, with loaner to be received prior to planned discharge. Pt has received her custom rigid AFO, with plan to issue Eunice Neurexa and gait belt prior to home with home care PT. Plan to complete additional family training session to solidify safe use of equipment and transfers.    Cognition/Language: Patient does recognize changes in her cognition and that she is not at her baseline level of function. Motivated to continue improving cognitive function. Completing tasks with approximately 70% accuracy but able to improve with verbal cues.     Community Re-Entry: Plan for WC-based community mobility d/t strength, balance, cognitive impairments.    Transportation: Family to provide, and they have been able to perform hands-on practice of car transfers.    Decision maker: self    Plan of Care and goals reviewed and updated.    Discharge Plan/Recommendations    Fall Precautions: continue    Patient/Family input to goals:  yes     Estimated length of stay: 20 days     Overall plan for the patient: reach a level of mod I       Utilization Review and Continued Stay Justification    Medical Necessity Criteria:    For any criteria that is not met, please document reason and plan for discharge, transfer, or modification of plan of care to address.    Requires intensive rehabilitation program to treat functional deficits?: Yes    Requires 3x per week or greater involvement of rehabilitation physician to oversee rehabilitation program?: Yes    Requires rehabilitation nursing interventions?: Yes    Patient is making functional progress?: Yes    There is a potential for additional functional progress? Yes    Patient is participating in therapy 3 hours per day a minimum of 5 days per week or 15 hours per week in 7 day period?:Yes    Has discharge needs that require coordinated discharge planning approach?:Yes      Barriers/Concerns related to meeting medical necessity criteria:  None     Team Plan to Address Concern:  As needed       Final Physician Sign off    Statement of Approval:  Andrew Marcos, DO      Patient Goals  Social Work Goals: HC set up, resources given, SW will remain available and continue to follow.     OT Predicted Duration/Target Date for Goal Attainment: 09/30/22  Therapy Frequency (OT): Daily  OT: Hygiene/Grooming: supervision/stand-by assist, within precautions  OT: Upper Body Dressing: Supervision/stand-by assist, within precautions  OT: Lower Body Dressing: Minimal assist, using adaptive equipment, within precautions  OT: Upper Body Bathing: Minimal assist, using adaptive equipment, within precautions  OT: Lower Body Bathing: Minimal assist, using adaptive equipment, with precautions  OT: Bed Mobility: Modified independent, within precautions  OT: Transfer: Supervision/stand-by assist, with assistive device, within precautions  OT: Toilet Transfer/Toileting: Minimal assist, cleaning and garment management, using  adaptive equipment, within precautions  OT: Meal Preparation: Minimal assist, using adaptive equipment, with simple meal preparation  OT: Home Management: Minimal assist, with light demand household tasks, within precautions, using adaptive equipment  OT: Cognitive: Patient/caregiver will verbalize understanding of cognitive assessment results/recommendations as needed for safe discharge planning     PT Predicted Duration/Target Date for Goal Attainment: 09/26/22  PT Frequency: Daily  PT: Bed Mobility: Independent, Supine to/from sit, Rolling, Bridging  PT: Transfers: Supervision/stand-by assist, Sit to/from stand, Bed to/from chair, Assistive device  PT: Gait: Quad cane, 25 feet, Minimal assist  PT: Stairs: 1 stair, Assistive device, Minimal assist  PT: Wheelchair Mobility: Caregiver SBA, 150 feet  PT: Goal 1: Pt will be able to complete car transfer with quad cane and Marilynn in order to access home and medical appts.  PT: Goal 2: Pt will be able to complete floor>furniture transfer with mod A as part of fall recovery training.  PT: Goal 3: Pt will be IND with HEP for L LE/UE strengthening to help reduce risk of future falls.    SLP Predicted Duration/Target Date for Goal Attainment: 09/21/22  Therapy Frequency (SLP Eval): daily  SLP: Safely tolerate diet without signs/symptoms of aspiration: Regular diet, Thin liquids, Independently, With use of swallow precautions GOAL MET  SLP: Goal 1: Pt will demonstrate and initiate a plan of mod complexity with min cues 90% accuracy  SLP: Goal 2: Pt will recall novel and functional information of mod complexty with min cues 90% accuracu  SLP: Goal 3: Pt will implement word finding strategies within conversations with min cues 90% accuracy    Nursing Goals  Bowel and Bladder care  Fall prevention   Medication Education  Skin Care protection

## 2022-09-28 ENCOUNTER — APPOINTMENT (OUTPATIENT)
Dept: OCCUPATIONAL THERAPY | Facility: CLINIC | Age: 67
DRG: 056 | End: 2022-09-28
Attending: PHYSICAL MEDICINE & REHABILITATION
Payer: COMMERCIAL

## 2022-09-28 ENCOUNTER — APPOINTMENT (OUTPATIENT)
Dept: PHYSICAL THERAPY | Facility: CLINIC | Age: 67
DRG: 056 | End: 2022-09-28
Attending: PHYSICAL MEDICINE & REHABILITATION
Payer: COMMERCIAL

## 2022-09-28 PROCEDURE — 97535 SELF CARE MNGMENT TRAINING: CPT | Mod: GO | Performed by: OCCUPATIONAL THERAPIST

## 2022-09-28 PROCEDURE — 97110 THERAPEUTIC EXERCISES: CPT | Mod: GO | Performed by: OCCUPATIONAL THERAPIST

## 2022-09-28 PROCEDURE — 250N000011 HC RX IP 250 OP 636

## 2022-09-28 PROCEDURE — 97530 THERAPEUTIC ACTIVITIES: CPT | Mod: GO | Performed by: OCCUPATIONAL THERAPIST

## 2022-09-28 PROCEDURE — 250N000013 HC RX MED GY IP 250 OP 250 PS 637: Performed by: INTERNAL MEDICINE

## 2022-09-28 PROCEDURE — 250N000013 HC RX MED GY IP 250 OP 250 PS 637

## 2022-09-28 PROCEDURE — 250N000013 HC RX MED GY IP 250 OP 250 PS 637: Performed by: PHYSICAL MEDICINE & REHABILITATION

## 2022-09-28 PROCEDURE — 99232 SBSQ HOSP IP/OBS MODERATE 35: CPT | Performed by: PHYSICAL MEDICINE & REHABILITATION

## 2022-09-28 PROCEDURE — 97535 SELF CARE MNGMENT TRAINING: CPT | Mod: GO

## 2022-09-28 PROCEDURE — 97530 THERAPEUTIC ACTIVITIES: CPT | Mod: GP

## 2022-09-28 PROCEDURE — 97530 THERAPEUTIC ACTIVITIES: CPT | Mod: GO

## 2022-09-28 PROCEDURE — 250N000013 HC RX MED GY IP 250 OP 250 PS 637: Performed by: STUDENT IN AN ORGANIZED HEALTH CARE EDUCATION/TRAINING PROGRAM

## 2022-09-28 PROCEDURE — 128N000003 HC R&B REHAB

## 2022-09-28 RX ADMIN — Medication 5 MG: at 20:07

## 2022-09-28 RX ADMIN — ATORVASTATIN CALCIUM 80 MG: 80 TABLET, FILM COATED ORAL at 20:07

## 2022-09-28 RX ADMIN — HEPARIN SODIUM 5000 UNITS: 5000 INJECTION, SOLUTION INTRAVENOUS; SUBCUTANEOUS at 11:04

## 2022-09-28 RX ADMIN — LISINOPRIL 30 MG: 20 TABLET ORAL at 20:07

## 2022-09-28 RX ADMIN — AMLODIPINE BESYLATE 5 MG: 5 TABLET ORAL at 09:18

## 2022-09-28 RX ADMIN — HEPARIN SODIUM 5000 UNITS: 5000 INJECTION, SOLUTION INTRAVENOUS; SUBCUTANEOUS at 20:07

## 2022-09-28 RX ADMIN — Medication 25 MG: at 20:07

## 2022-09-28 RX ADMIN — LETROZOLE 2.5 MG: 2.5 TABLET, FILM COATED ORAL at 20:07

## 2022-09-28 RX ADMIN — PANTOPRAZOLE SODIUM 40 MG: 40 TABLET, DELAYED RELEASE ORAL at 09:18

## 2022-09-28 RX ADMIN — ASPIRIN 81 MG: 81 TABLET, COATED ORAL at 09:18

## 2022-09-28 RX ADMIN — CLOPIDOGREL BISULFATE 75 MG: 75 TABLET, FILM COATED ORAL at 09:18

## 2022-09-28 ASSESSMENT — ACTIVITIES OF DAILY LIVING (ADL)
ADLS_ACUITY_SCORE: 48

## 2022-09-28 NOTE — PROGRESS NOTES
fDischarge Planner Post-Acute Rehab OT:      Discharge Plan: home with spouse and adult son A, HC OT     Precautions: falls, dense L jong, monitor BP, LUE omoneurexa OOB for LUE mgmt, breast cancer - chemo precautions     Current Status:  ADLs:    Mobility: Ax1 squat pivot both directions, use of quad cane to assist to L initiated on 9/28 per PT. Recommend Ax1 squat pivot with nsg, easiest to right. Ok to sit EOB independently - no alarms.    Grooming: IND with set up/clean up EOS simple g/h and oral cares. A for hair.     Dressing: UB- IND with set up doff/don pull over shirt. Mod A LUE yoana neurexa. LB - Max A don/doff over hips, SBA thread/unthread. Feet - IND with set up don/doff socks. SBA don RLE shoe with elastic laces. Max A don LLE AFO and shoe.    Bathing: Max A sponge bathing. Transfer N/A d/t isolation precautions upon arrival. Need to reassess now off iso.    Toileting: Squat pivot with grab rail wc <> commode overlay. Total A cares and clothing mgmt standing with gb.   IADLs: IND PTA. Anticipate A at discharge.  Vision/Cognition: L strabismus baseline with pt reporting, pt reports worse vision in L eye at baseline. Functional deficits problem solving, memory, and wordfinding.     Assessment: Pt. Reports has all necessary AE for home.  Pt. Will have assist at home from spouse/son.  Discharge plan for 9/30.  Continue with OT POC  Other Barriers to Discharge (DME, Family Training, etc): 1 stair to enter from garage, then all needs met on main level. Walk in shower with high lip with showerhead on R wall and vinyl inlay (not able to apply stud gbs). Standard height toilet without wall to secure gb.      DME: Recommend ETB and vertical gb at left side of WIS entrance, BSC/OTC, drop arm grab bar at left side of toilet, yoana neurexa, temporary bed rail, bidet, HHSH. Mobility devices per PT. Family provided handouts.     Family training - Thursday 9/22 PM with sons and spouse completed. Additional training  Tuesday 9/27 PM.

## 2022-09-28 NOTE — PLAN OF CARE
Goal Outcome Evaluation:        Patient alert and oriented x4. A1 ubaldo steady for bathroom or A1 pivot transfer. Continent of bowel and bladder, LBM: 9/27. Chemo precautions maintained. Regular diet, thins, pills whole. L chest mass covered, no active bleeding today, dressing changed. VSS. Pt denies pain during shift. Will continue with POC.

## 2022-09-28 NOTE — PROGRESS NOTES
"  [facility]   Acute Rehabilitation Unit  Daily progress note    INTERVAL HISTORY  Stephani Garcia was seen and examined at bedside.  No acute events overnight.  States stomach feels better today.  Less dizziness as well.  Denies chest pain, shortness of breath, no fever or chills.     Functional  PT:  Bed Mobility: SBA rolling and supine<>sit, cues for attn to L side  Transfer: CGA squat/stand pivot to the R, min/mod A to the L (working with NBQC in therapies)  Gait: up to 20 ft, mod A for trunk and L LE mgmt at R rail + WC follow  Stairs: not appropriate at this time  Balance: PASS on 9/6: 13/36                            on 9/13: 21/36                            On 9/23: 23/36              Bell on 9/23: 12/56      ROS: 10 point ROS neg other than the symptoms noted above in the HPI.      MEDICATIONS    amLODIPine  5 mg Oral Daily     aspirin  81 mg Oral Daily     atorvastatin  80 mg Oral QPM     clopidogrel  75 mg Oral Daily     heparin ANTICOAGULANT  5,000 Units Subcutaneous Q12H     letrozole  2.5 mg Oral At Bedtime     lisinopril  30 mg Oral At Bedtime     melatonin  5 mg Oral At Bedtime     pantoprazole  40 mg Oral QAM AC     traZODone  25 mg Oral At Bedtime        acetaminophen, bisacodyl, hydrALAZINE, polyethylene glycol, senna-docusate     PHYSICAL EXAM  /65 (BP Location: Right arm)   Pulse 97   Temp (!) 96.2  F (35.7  C) (Oral)   Resp 16   Ht 1.702 m (5' 7\")   Wt 75.8 kg (167 lb)   SpO2 95%   BMI 26.16 kg/m    General: No acute distress, resting in bed   HEENT: NC/NT, Moist mucous membranes  Pulmonary: Breathing comfortably on room air, clear to auscultation bilaterally  Cardiovascular: Regular rate and rhythm, 2+ radial pulse  Abdominal: Non-tender, non-distended   Extremities: warm, well perfused, no edema in bilateral lower extremities  Neuro/MSK: Alert and oriented, hearing intact to conversation, moving right upper extremity greater than antigravity, " "has some trace hand and finger movement on L.     LABS  No results found for this or any previous visit (from the past 24 hour(s)).    Rehabilitation - continue comprehensive acute inpatient rehabilitation program with multidisciplinary approach including therapies, rehab nursing, and physiatry following. See interval history for updates.      ASSESSMENT AND PLAN  Stephani Garcia is a 67 year old left hand dominant female with PMHx HTN, HLD, vertigo, GILDA (not using CPAP). Admitted for acute ischemic infarcts with right frontal lobe and right basal ganglia involvement. Has functional deficits of weak L. shoulder shrug, L. Hemiparesis, dysphagia, dysarthria, possbile neurogenic bowel/bladder and cognitive deficits. She also has possible L. Breast CA pending pathology results. She is admitted to undergo therapies with PT/OT/SLP.     Admission to acute inpatient rehab: 9/5/22  Impairment group code: 01.1  Stroke Ischemic  (L) Body Involvement (R) Brain: small cluster of recent infarcts in the right frontal lobe and right basal ganglia.       # Acute ischemic stroke   # Recent infarcts in the right frontal lobe and right basal ganglia  Noted left-sided partial hemiparesis and mild dysarthria/expressive aphasia at presentation 8/30. MRI confirmed  Acute ischemic stroke, and TPA/ thrombectomy 2/2 unknown last known normal. CTA showed no large vessel occlusion, with only 30% of ICA occlusion. Echo was normal EF 65% on 8/31. No Afib noted with telemetry.  at baseline. BP goal is <140/90 at rest, anticipate increase with therapies.   - Plavix 75 mg and aspirin 81 mg until 11/30   -aspirin 325mg starting 12/1  -  Atorvastatin 80 mg    - PT/OT/SLP     # Dysphagia    Video swallow study on 9/2/22: \"Patient had direct, silent aspiration with mildly thick liquid on first trial and deep penetration with mildly thick liquids on subsequent trials. No aspiration or penetration with moderately thick liquids.   - Continue " dysphagia diet with moderately thick fluids  - SLP  - repeat swallow study for 9/20 - Pt presents with significant improved oropharyngeal swallow function and demonstrated no aspiration across all consistencies. Pt with few instances reduced laryngeal vestibule closure resulting in intermittent flash penetration with mildly thick (2) and large sips thin (0) liquid. Recommend pt continue regular texture, advance to level 0 liquid. straws ok. Ensure upright fully with all PO.     # Left Breast Ductal Carcinoma   FNA 9/2. There is a high suspicion for breast CA. Surgery rec'd neoadjuvent treatment with hopes of shrinking tumor prior to surgery. Unable to surgically operate due to recent stroke  - Needs referral to oncology for outpatient follow-up.  --9/7 biopsy results: ductal carcinoma and estrogen and progesterone positive.     - Follow [facility] per [facility]  - WOC consult: appreciate recs 9/6   -Letrozole 2.5 mg daily     #UTI - Resolved  -urine from 9/19 showed WBCs, leukocyte esterase, mucus  -Pt reports abdominal pain, dysuria, similar to previous UTIs  -Macrobid BID for 5 days, ended 9/24     # Hyperlipidemia    at baseline.  - Atorvastatin 80 mg      # Hypertension  Goal BP <140/90 at rest.  - Lisinopril 30 mg daily  - Amlodipine 10 mg Daily 9/15 - adjusted to 5 mg given dizziness  - Hydralazine 10 mg PRN for SBP over 170     # Neurogenic Bladder  - Timed voiding  - purewick HS     # Bowel   - Miralax daily, senokot BID  - PRN bisacodyl suppository     # Sleep   - Melatonin 5 mg at bedtime  - Trazodone 25 mg at bedtime     # Asymptomatic COVID 19 Infection - Resolved  Positive Covid test on 8/3 with preceding cold symptoms for 3 days prior. Currently she continues remain asymptomatic from COVID symptoms on 9/5. She has no cough, wheezing, SOB, fever, chills, body aches or any type of cold symptoms.  She has completed remdesivir x 3 days.  Off of quarantine     #GILDA (obstructive sleep  apnea):  Patient does not use CPAP at baseline. May need repeat sleep study and eval by sleep medicine team.   - encourage using CPAP         6. Adjustment to disability:  Clinical psychology to eval and treat, seen 9/13  7. FEN: Regular Diet Adult Liquidized/Moderately Thick (level 3)  8. Bowel: Scheduled Senokot & Miralax  9. Bladder: Timed voiding  10. DVT Prophylaxis: Heparin 5000 units for DVT ppx. Plavix 75 mg daily and ASA 81 mg 90 days then ASA alone.   11. GI Prophylaxis: Pantoprazole 40 mg   12. Code: Full  13. Disposition: Home  14. ELOS:  9/30/2022  15. Rehab prognosis:  Fair  16. Follow up Appointments on Discharge:   - PCP follow up 7-14 days post discharge - needs to establish with new PCP  - Follow-up with neurology in 6 to 8 weeks  - Referral for oncology given biopsy results   - GILDA: outpatient referral to sleep medicine clinic if agreeable        Andrew Marcos,         I spent a total of 25 minutes face to face and coordinating care of Stephani Garcia. Over 50% of my time on the unit was spent counseling the patient and /or coordinating care regarding post CVA.

## 2022-09-28 NOTE — PLAN OF CARE
FOCUS/GOAL  Bowel management, Bladder management, Pain management and Cognition/Memory/Judgment/Problem solving    ASSESSMENT, INTERVENTIONS AND CONTINUING PLAN FOR GOAL:  Pt is alert and oriented x4, denies fever, chills, chest pain, SOB, N/V, abdominal pain, or new weakness/numbness/tingling, continent of bowel and bladder, transferring with assist of 1 and ww SPT from bed in room, Lea stedy to bathroom, sleeping off and on throughout the night, no tubes, lines or drains, vs stable. Dressing CDI to L chest, no further care concerns at this time continue with POC.       Goal Outcome Evaluation: Improving

## 2022-09-28 NOTE — PLAN OF CARE
Goal Outcome Evaluation:    Plan of Care Reviewed With: patient     Overall Patient Progress: no change    Outcome Evaluation: Pt seems knowledgable regarding scheduled medications. Left breast wound care completed, no concerns noted. Remains continent of bladder. No pain reported at this time.

## 2022-09-28 NOTE — PLAN OF CARE
Discharge Planner Post-Acute Rehab PT:      Discharge Plan: Home WC-based with  and son assist, 2 DAVE without rail (ramp to be installed 9/29). Home care PT.     Precautions: Falls, L hemiplegia, dysphagia, breast CA (cleared for NMES)     Current Status:  Bed Mobility: SBA rolling and supine<>sit, cues for attn to L side  Transfer: CGA squat/stand pivot to the R, min/mod A to the L (working with NBQC in therapies)  Gait: up to 20 ft, mod A for trunk and L LE mgmt at R rail + WC follow  Stairs: not appropriate at this time  Balance: PASS on 9/6: 13/36                            on 9/13: 21/36                            On 9/23: 23/36              Bell on 9/23: 12/56     Assessment: -25 min d/t schedule change. Educated Pt on upcoming IND day activities. Continued focus on mass practice of squat pivot transfers and SPT w/NBQC from EOB<>WC. Pt continues to require consistent cues for safe set-up. Pt displayed difficulty w/sequencing for SPT w/NBWC to L today.      Other Barriers to Discharge (DME, Family Training, etc):   Equipment: Plan to issue L Eunice Neurexa on 9/29.   Son to purchase gait belt and NBQC.  L AFO received/fitted from [facility]P.  WC eval completed 9/22 (K4+L arm tray)  Family training completed 9/22 and 9/27.

## 2022-09-29 ENCOUNTER — APPOINTMENT (OUTPATIENT)
Dept: OCCUPATIONAL THERAPY | Facility: CLINIC | Age: 67
DRG: 056 | End: 2022-09-29
Attending: PHYSICAL MEDICINE & REHABILITATION
Payer: COMMERCIAL

## 2022-09-29 ENCOUNTER — APPOINTMENT (OUTPATIENT)
Dept: SPEECH THERAPY | Facility: CLINIC | Age: 67
DRG: 056 | End: 2022-09-29
Attending: PHYSICAL MEDICINE & REHABILITATION
Payer: COMMERCIAL

## 2022-09-29 ENCOUNTER — APPOINTMENT (OUTPATIENT)
Dept: PHYSICAL THERAPY | Facility: CLINIC | Age: 67
DRG: 056 | End: 2022-09-29
Attending: PHYSICAL MEDICINE & REHABILITATION
Payer: COMMERCIAL

## 2022-09-29 PROCEDURE — 250N000013 HC RX MED GY IP 250 OP 250 PS 637: Performed by: INTERNAL MEDICINE

## 2022-09-29 PROCEDURE — 97535 SELF CARE MNGMENT TRAINING: CPT | Mod: GO

## 2022-09-29 PROCEDURE — 90832 PSYTX W PT 30 MINUTES: CPT | Performed by: PSYCHOLOGIST

## 2022-09-29 PROCEDURE — 250N000011 HC RX IP 250 OP 636

## 2022-09-29 PROCEDURE — 128N000003 HC R&B REHAB

## 2022-09-29 PROCEDURE — 250N000013 HC RX MED GY IP 250 OP 250 PS 637: Performed by: STUDENT IN AN ORGANIZED HEALTH CARE EDUCATION/TRAINING PROGRAM

## 2022-09-29 PROCEDURE — 250N000013 HC RX MED GY IP 250 OP 250 PS 637: Performed by: PHYSICAL MEDICINE & REHABILITATION

## 2022-09-29 PROCEDURE — 250N000013 HC RX MED GY IP 250 OP 250 PS 637

## 2022-09-29 PROCEDURE — 97129 THER IVNTJ 1ST 15 MIN: CPT | Mod: GN

## 2022-09-29 PROCEDURE — 97530 THERAPEUTIC ACTIVITIES: CPT | Mod: GP | Performed by: PHYSICAL THERAPIST

## 2022-09-29 PROCEDURE — 97130 THER IVNTJ EA ADDL 15 MIN: CPT | Mod: GN

## 2022-09-29 PROCEDURE — 97750 PHYSICAL PERFORMANCE TEST: CPT | Mod: GP | Performed by: PHYSICAL THERAPIST

## 2022-09-29 PROCEDURE — 99232 SBSQ HOSP IP/OBS MODERATE 35: CPT | Mod: CS | Performed by: PHYSICAL MEDICINE & REHABILITATION

## 2022-09-29 RX ORDER — LETROZOLE 2.5 MG/1
2.5 TABLET, FILM COATED ORAL AT BEDTIME
Qty: 30 TABLET | Refills: 0 | Status: SHIPPED | OUTPATIENT
Start: 2022-09-29 | End: 2022-10-05

## 2022-09-29 RX ORDER — CLOPIDOGREL BISULFATE 75 MG/1
75 TABLET ORAL DAILY
Qty: 62 TABLET | Refills: 0 | Status: SHIPPED | OUTPATIENT
Start: 2022-09-29 | End: 2022-11-30

## 2022-09-29 RX ORDER — AMLODIPINE BESYLATE 5 MG/1
5 TABLET ORAL DAILY
Qty: 30 TABLET | Refills: 0 | Status: SHIPPED | OUTPATIENT
Start: 2022-09-30 | End: 2022-10-30

## 2022-09-29 RX ORDER — ATORVASTATIN CALCIUM 80 MG/1
80 TABLET, FILM COATED ORAL EVERY EVENING
Qty: 30 TABLET | Refills: 0 | Status: SHIPPED | OUTPATIENT
Start: 2022-09-29 | End: 2022-10-29

## 2022-09-29 RX ORDER — PANTOPRAZOLE SODIUM 40 MG/1
40 TABLET, DELAYED RELEASE ORAL
Qty: 30 TABLET | Refills: 0 | Status: SHIPPED | OUTPATIENT
Start: 2022-09-30 | End: 2022-10-30

## 2022-09-29 RX ORDER — LISINOPRIL 30 MG/1
30 TABLET ORAL AT BEDTIME
Qty: 30 TABLET | Refills: 0 | Status: SHIPPED | OUTPATIENT
Start: 2022-09-29 | End: 2022-10-29

## 2022-09-29 RX ORDER — TRAZODONE HYDROCHLORIDE 50 MG/1
25 TABLET, FILM COATED ORAL
Qty: 30 TABLET | Refills: 0 | Status: SHIPPED | OUTPATIENT
Start: 2022-09-29 | End: 2022-10-29

## 2022-09-29 RX ADMIN — LISINOPRIL 30 MG: 20 TABLET ORAL at 20:38

## 2022-09-29 RX ADMIN — Medication 5 MG: at 20:38

## 2022-09-29 RX ADMIN — ASPIRIN 81 MG: 81 TABLET, COATED ORAL at 08:04

## 2022-09-29 RX ADMIN — PANTOPRAZOLE SODIUM 40 MG: 40 TABLET, DELAYED RELEASE ORAL at 08:04

## 2022-09-29 RX ADMIN — CLOPIDOGREL BISULFATE 75 MG: 75 TABLET, FILM COATED ORAL at 08:04

## 2022-09-29 RX ADMIN — HEPARIN SODIUM 5000 UNITS: 5000 INJECTION, SOLUTION INTRAVENOUS; SUBCUTANEOUS at 08:04

## 2022-09-29 RX ADMIN — LETROZOLE 2.5 MG: 2.5 TABLET, FILM COATED ORAL at 20:38

## 2022-09-29 RX ADMIN — HEPARIN SODIUM 5000 UNITS: 5000 INJECTION, SOLUTION INTRAVENOUS; SUBCUTANEOUS at 20:38

## 2022-09-29 RX ADMIN — ATORVASTATIN CALCIUM 80 MG: 80 TABLET, FILM COATED ORAL at 20:38

## 2022-09-29 RX ADMIN — SENNOSIDES AND DOCUSATE SODIUM 2 TABLET: 50; 8.6 TABLET ORAL at 14:32

## 2022-09-29 RX ADMIN — AMLODIPINE BESYLATE 5 MG: 5 TABLET ORAL at 08:04

## 2022-09-29 RX ADMIN — Medication 25 MG: at 20:38

## 2022-09-29 ASSESSMENT — ACTIVITIES OF DAILY LIVING (ADL)
ADLS_ACUITY_SCORE: 48

## 2022-09-29 NOTE — PROGRESS NOTES
"  [facility]   Acute Rehabilitation Unit  Daily progress note    INTERVAL HISTORY  Stephani Garcia was seen and examined this AM.  No acute events overnight.  Is getting more return in L hand, still not functional but trace movement of digits.  Denies chest pain, shortness of breath, no fever or chills.  Plan is for discharge home tomorrow.    Functional  PT:  Postural Assessment for Stroke Scale (PASS)     Maintaining posture -   1) Sitting without support - 3  2) Standing with support (feet position free) - 3  3) Standing without support (feet position free) - 2  4) Standing on nonparetic leg - 0  5) Standing on paretic leg - 0     Changing posture -  6) Rolling supine -> affected side - 3  7) Rolling supine -> unaffected side - 3  8) Sup->sitting edge of bed - 3  9) Sitting edge of bed -> sup - 3  10) Sit->stand without support - 2  11) Stand->sit without support - 3  12) Standing,  pencil from floor without support - 1     Total Score - 26/36       ROS: 10 point ROS neg other than the symptoms noted above in the HPI.        MEDICATIONS    amLODIPine  5 mg Oral Daily     aspirin  81 mg Oral Daily     atorvastatin  80 mg Oral QPM     clopidogrel  75 mg Oral Daily     heparin ANTICOAGULANT  5,000 Units Subcutaneous Q12H     letrozole  2.5 mg Oral At Bedtime     lisinopril  30 mg Oral At Bedtime     melatonin  5 mg Oral At Bedtime     pantoprazole  40 mg Oral QAM AC     traZODone  25 mg Oral At Bedtime        acetaminophen, bisacodyl, hydrALAZINE, polyethylene glycol, senna-docusate     PHYSICAL EXAM  /76 (BP Location: Right arm, Patient Position: Sitting, Cuff Size: Adult Regular)   Pulse 106   Temp (!) 95.7  F (35.4  C) (Oral)   Resp 16   Ht 1.702 m (5' 7\")   Wt 75.8 kg (167 lb)   SpO2 97%   BMI 26.16 kg/m    General: No acute distress, resting in bed   HEENT: NC/NT, Moist mucous membranes  Pulmonary: Breathing comfortably on room air, clear to auscultation " bilaterally  Cardiovascular: Regular rate and rhythm, 2+ radial pulse  Abdominal: Non-tender, non-distended   Extremities: warm, well perfused, no edema in bilateral lower extremities  Neuro/MSK: Alert and oriented, hearing intact to conversation, moving right upper extremity greater than antigravity, has some trace hand and finger movement on L - improved from yesterday.      LABS  No results found for this or any previous visit (from the past 24 hour(s)).    Rehabilitation - continue comprehensive acute inpatient rehabilitation program with multidisciplinary approach including therapies, rehab nursing, and physiatry following. See interval history for updates.      ASSESSMENT AND PLAN  Stephani Garcia is a 67 year old left hand dominant female with PMHx HTN, HLD, vertigo, GILDA (not using CPAP). Admitted for acute ischemic infarcts with right frontal lobe and right basal ganglia involvement. Has functional deficits of weak L. shoulder shrug, L. Hemiparesis, dysphagia, dysarthria, possbile neurogenic bowel/bladder and cognitive deficits. She also has possible L. Breast CA pending pathology results. She is admitted to undergo therapies with PT/OT/SLP.     Admission to acute inpatient rehab: 9/5/22  Impairment group code: 01.1  Stroke Ischemic  (L) Body Involvement (R) Brain: small cluster of recent infarcts in the right frontal lobe and right basal ganglia.       # Acute ischemic stroke   # Recent infarcts in the right frontal lobe and right basal ganglia  Noted left-sided partial hemiparesis and mild dysarthria/expressive aphasia at presentation 8/30. MRI confirmed  Acute ischemic stroke, and TPA/ thrombectomy 2/2 unknown last known normal. CTA showed no large vessel occlusion, with only 30% of ICA occlusion. Echo was normal EF 65% on 8/31. No Afib noted with telemetry.  at baseline. BP goal is <140/90 at rest, anticipate increase with therapies.   - Plavix 75 mg and aspirin 81 mg until 11/30   -aspirin 325mg  "starting 12/1  -  Atorvastatin 80 mg    - PT/OT/SLP     # Dysphagia    Video swallow study on 9/2/22: \"Patient had direct, silent aspiration with mildly thick liquid on first trial and deep penetration with mildly thick liquids on subsequent trials. No aspiration or penetration with moderately thick liquids.   - Continue dysphagia diet with moderately thick fluids  - SLP  - repeat swallow study for 9/20 - Pt presents with significant improved oropharyngeal swallow function and demonstrated no aspiration across all consistencies. Pt with few instances reduced laryngeal vestibule closure resulting in intermittent flash penetration with mildly thick (2) and large sips thin (0) liquid. Recommend pt continue regular texture, advance to level 0 liquid. straws ok. Ensure upright fully with all PO.     # Left Breast Ductal Carcinoma   FNA 9/2. There is a high suspicion for breast CA. Surgery rec'd neoadjuvent treatment with hopes of shrinking tumor prior to surgery. Unable to surgically operate due to recent stroke  - Needs referral to oncology for outpatient follow-up.  --9/7 biopsy results: ductal carcinoma and estrogen and progesterone positive.     - Follow [facility] per [facility]  - WOC consult: appreciate recs 9/6   -Letrozole 2.5 mg daily     #UTI - Resolved  -urine from 9/19 showed WBCs, leukocyte esterase, mucus  -Pt reports abdominal pain, dysuria, similar to previous UTIs  -Macrobid BID for 5 days, ended 9/24     # Hyperlipidemia    at baseline.  - Atorvastatin 80 mg      # Hypertension  Goal BP <140/90 at rest.  - Lisinopril 30 mg daily  - Amlodipine 10 mg Daily 9/15 - adjusted to 5 mg given dizziness  - Hydralazine 10 mg PRN for SBP over 170     # Neurogenic Bladder  - Timed voiding  - purewick HS     # Bowel   - Miralax daily, senokot BID  - PRN bisacodyl suppository     # Sleep   - Melatonin 5 mg at bedtime  - Trazodone 25 mg at bedtime     # Asymptomatic COVID 19 Infection - " Resolved  Positive Covid test on 8/3 with preceding cold symptoms for 3 days prior. Currently she continues remain asymptomatic from COVID symptoms on 9/5. She has no cough, wheezing, SOB, fever, chills, body aches or any type of cold symptoms.  She has completed remdesivir x 3 days.  Off of quarantine     #GILDA (obstructive sleep apnea):  Patient does not use CPAP at baseline. May need repeat sleep study and eval by sleep medicine team.   - encourage using CPAP         6. Adjustment to disability:  Clinical psychology to eval and treat, seen 9/13  7. FEN: Regular Diet Adult Liquidized/Moderately Thick (level 3)  8. Bowel: Scheduled Senokot & Miralax  9. Bladder: Timed voiding  10. DVT Prophylaxis: Heparin 5000 units for DVT ppx. Plavix 75 mg daily and ASA 81 mg 90 days then ASA alone.   11. GI Prophylaxis: Pantoprazole 40 mg   12. Code: Full  13. Disposition: Home  14. ELOS:  9/30/2022  15. Rehab prognosis:  Fair  16. Follow up Appointments on Discharge:   - PCP follow up 7-14 days post discharge - needs to establish with new PCP  - Follow-up with neurology in 6 to 8 weeks  - Referral for oncology given biopsy results   - GILDA: outpatient referral to sleep medicine clinic if agreeable        Andrew Marcos,         I spent a total of 25 minutes face to face and coordinating care of Stephani Garcia. Over 50% of my time on the unit was spent counseling the patient and /or coordinating care regarding post CVA.

## 2022-09-29 NOTE — PROGRESS NOTES
"PSYCHOLOGY PROGRESS NOTE    Start time: 1:30 PM  Stop Time: 1:50 PM  Total Duration in Minutes: 20  Patient was seen virtually ([employer] cart or other videoconferencing)    SUBJECTIVE:  Stephani Garcia was seen today for a follow-up visit.    Symptoms reported: Stephani reports feeling good and positive about her upcoming discharge tomorrow (9/30). She denies symptoms of depression and/or anxiety.     Progress toward goals: Continuing to cope with distress by using distraction strategies and CBT strategies to manage negative thoughts and emotions.    Interventions utilized: Reviewed experiences since last visit on 9/20. Supported her in emotions related to functional independence and provided positive reinforcement for engaging in coping strategies. Also supported patient in processing emotions related to discharge.    OBJECTIVE:  Stephani was lying in her bed during our visit. She reported her mood to be \"pretty good.\" Affect was mood congruent. Thought processes logical and linear. Insight and judgment good. Speech WNL.    ASSESSMENT:  Patient with PMH significant for acute ischemic infarcts with right frontal lobe and right basal ganglia involvement who has benefited from support with stress management in the context of prolonged hospitalization and impaired function.    DIAGNOSIS:    PLAN: Psychology will not see patient again before discharge on 9/30.     Soheila Torres, PhD   and Licensed Psychologist      "

## 2022-09-29 NOTE — PROGRESS NOTES
fDischarge Planner Post-Acute Rehab OT:      Discharge Plan: home with spouse and adult son A, HC OT     Precautions: falls, dense L jong, monitor BP, LUE omoneurexa OOB for LUE mgmt, breast cancer - chemo precautions     Current Status:  ADLs:    Mobility: CGA squat pivot both directions, use of quad cane to assist to L initiated on 9/28 per PT. Recommend Ax1 squat pivot with nsg, easiest to right. Ok to sit EOB independently - no alarms.    Grooming: IND with set up/clean up EOS simple g/h and oral cares. A for hair.     Dressing: UB- IND with set up doff/don pull over shirt. Mod A LUE yoana neurexa. LB - Mod A don/doff over hips, SBA thread/unthread. Feet - IND with set up don/doff socks. SBA don RLE shoe with elastic laces. Max A don LLE AFO and shoe.    Bathing: Transfer CGA SPT with gb wc <> ETB. Pt able to wash/dry 10/10 body parts, A to dry backside.    Toileting: Squat pivot with grab rail wc <> commode overlay.  Mod A c/m and total A rear cares.  IADLs: IND PTA.  A at discharge from family.  Vision/Cognition: L strabismus baseline with pt reporting, pt reports worse vision in L eye at baseline. Functional deficits problem solving, memory, and wordfinding.      Assessment: Completed independence day ADL assessment, see updated status above.    Other Barriers to Discharge (DME, Family Training, etc): 1 stair to enter from garage, then all needs met on main level. Walk in shower with high lip with showerhead on R wall and vinyl inlay (not able to apply stud gbs). Standard height toilet without wall to secure gb.      DME: Recommend ETB and vertical gb at left side of WIS entrance, BSC/OTC, drop arm grab bar at left side of toilet, yoana neurexa, temporary bed rail, bidet, HHSH. Mobility devices per PT. Family provided handouts.     Family training - Thursday 9/22 PM with sons and spouse completed. Additional training Tuesday 9/27 PM.

## 2022-09-29 NOTE — PLAN OF CARE
Postural Assessment for Stroke Scale (PASS)    Maintaining posture -   1) Sitting without support - 3  2) Standing with support (feet position free) - 3  3) Standing without support (feet position free) - 2  4) Standing on nonparetic leg - 0  5) Standing on paretic leg - 0    Changing posture -  6) Rolling supine -> affected side - 3  7) Rolling supine -> unaffected side - 3  8) Sup->sitting edge of bed - 3  9) Sitting edge of bed -> sup - 3  10) Sit->stand without support - 2  11) Stand->sit without support - 3  12) Standing,  pencil from floor without support - 1    Total Score - 26/36    The PASS assesses a patient's ability to change and maintain posture during different balance activities. It is not associated with falls risk, but is used to track progress with the patient's ability to control their posture and balance throughout the course of the patient's admission.

## 2022-09-29 NOTE — PLAN OF CARE
Goal Outcome Evaluation:    Plan of Care Reviewed With: patient     Overall Patient Progress: no change    Outcome Evaluation: Pt seems knowledgable regarding scheduled medications. Has yet to demonstrate her own left breast woud care. Remains continent of bladder. No pain reported at this time. Reported feeling constipated, PRN senna given.

## 2022-09-29 NOTE — PLAN OF CARE
Physical Therapy Discharge Summary    Reason for therapy discharge:    Discharged to home with home therapy.    Progress towards therapy goal(s). See goals on Care Plan in [facility] electronic health record for goal details.  Goals partially met.  Barriers to achieving goals:   ongoing L-sided strength, sensation, coordination, balance and cognitive impairments..    Bed Mobility: IND  Transfer: CGA squat/stand pivot to the R, min A to the L with NBQC  Gait: up to 20 ft, mod A for trunk and L LE mgmt at R rail with L AFO + WC follow  Stairs: not appropriate at this time  PASS on 9/29: 26/36  Bell on 9/29: 19/56    Therapy recommendation(s):    A ramp to enter the home is being installed today. Pt has been issued a L small Eunice Neurexa for adequate shoulder support for standing activities, considering hemiplegic shoulder; along with a L AFO for eventual use with functional ambulation. *Recommend transfers without AFO, as pt demonstrates adequate L ankle stability without inversion/rolling, and notes much greater ease in advancing and turning L LE to pivot, without added weight of the brace. Her son will be purchasing a gait belt and NBQC, and patient to receive her loaner K4 WC from [employer]. Family has been present for training sessions on 9/22 and 9/27 to cover safety with transfers, including in/out of the car, with the plan to continue with home care PT to progress L-sided strength, sensation, coordination, and balance to increase IND with mobility and reduce risk of future falls.

## 2022-09-29 NOTE — PLAN OF CARE
Goal Outcome Evaluation:        Patient alert and oriented x4. A1 ubaldo steady for bathroom or A1 pivot transfer. Continent of bowel and bladder, LBM: 9/27. Chemo precautions maintained. Regular diet, thins, pills whole. L chest mass covered, no active bleeding today. VSS. Pt denies pain during shift. Will continue with POC.

## 2022-09-29 NOTE — PROGRESS NOTES
Speech Language Therapy Discharge Summary    Reason for therapy discharge:    Discharged to home with home therapy.    Progress towards therapy goal(s). See goals on Care Plan in [facility] electronic health record for goal details.  Goals partially met.  Barriers to achieving goals:   discharge from facility.    Therapy recommendation(s):    Continued therapy is recommended.  Rationale/Recommendations:   Pt demonstrated improvement thus far re: cognition and oropharyngeal swallow function. Recommend continue regular texture, thin (0) liquid diet. Recommend ongoing SLP to continue targeting cognitive impairment.     VFSS 9/20: Pt seen for repeat VFSS on this date. Pt presents with significant improved oropharyngeal swallow function and demonstrated no aspiration across all consistencies. Pt with few instances reduced laryngeal vestibule closure resulting in intermittent flash penetration with mildly thick (2) and large sips thin (0) liquid. Recommend pt continue regular texture, advance to level 0 liquid. straws ok. Ensure upright fully with all PO. Pt continues to depict dry cough not directly following PO or related to PO. Likely lingering COVID impact. SLP to make diet advancement tomorrow if tolerance is consistent across meal and larger intake

## 2022-09-29 NOTE — PLAN OF CARE
FOCUS/GOAL  Bowel management, Bladder management, Pain management and Cognition/Memory/Judgment/Problem solving    ASSESSMENT, INTERVENTIONS AND CONTINUING PLAN FOR GOAL:  Pt is alert and oriented x4, denies fever, chills, chest pain, SOB, N/V, abdominal pain, or new weakness/numbness/tingling, continent of bowel and bladder, transferring with assist of 1 and ww SPT from bed in room, Lea stedy to bathroom, sleeping off and on throughout the night, no tubes, lines or drains, vs stable. Dressing CDI to L chest, no further care concerns at this time continue with POC.     Goal Outcome Evaluation: No change

## 2022-09-30 VITALS
RESPIRATION RATE: 16 BRPM | BODY MASS INDEX: 26.21 KG/M2 | SYSTOLIC BLOOD PRESSURE: 145 MMHG | DIASTOLIC BLOOD PRESSURE: 76 MMHG | HEART RATE: 99 BPM | OXYGEN SATURATION: 96 % | WEIGHT: 167 LBS | HEIGHT: 67 IN | TEMPERATURE: 96 F

## 2022-09-30 PROCEDURE — 99239 HOSP IP/OBS DSCHRG MGMT >30: CPT | Mod: CS | Performed by: PHYSICAL MEDICINE & REHABILITATION

## 2022-09-30 PROCEDURE — 250N000011 HC RX IP 250 OP 636

## 2022-09-30 PROCEDURE — 250N000013 HC RX MED GY IP 250 OP 250 PS 637: Performed by: PHYSICAL MEDICINE & REHABILITATION

## 2022-09-30 PROCEDURE — 250N000013 HC RX MED GY IP 250 OP 250 PS 637

## 2022-09-30 RX ADMIN — PANTOPRAZOLE SODIUM 40 MG: 40 TABLET, DELAYED RELEASE ORAL at 09:06

## 2022-09-30 RX ADMIN — AMLODIPINE BESYLATE 5 MG: 5 TABLET ORAL at 09:06

## 2022-09-30 RX ADMIN — CLOPIDOGREL BISULFATE 75 MG: 75 TABLET, FILM COATED ORAL at 09:06

## 2022-09-30 RX ADMIN — ASPIRIN 81 MG: 81 TABLET, COATED ORAL at 09:06

## 2022-09-30 RX ADMIN — HEPARIN SODIUM 5000 UNITS: 5000 INJECTION, SOLUTION INTRAVENOUS; SUBCUTANEOUS at 09:06

## 2022-09-30 ASSESSMENT — ACTIVITIES OF DAILY LIVING (ADL)
ADLS_ACUITY_SCORE: 48

## 2022-09-30 NOTE — PROGRESS NOTES
Discharge home today. Family transport. Orders and updated clinicals faxed to [facility]  this morning. No additional SW needs.     Per [facility] Intake (pt aware and in agreement[facility], Ok to accept. Member would be 100% responsible for services until satisfying deductible, $250 remaining. Once met, member would have a 5% coinsurance per visit. MSW not covered.     Pt does not have a regular PCP. HUC will assist and get pt set up with PCP at discharge. ARU MD to sign HC orders until PCP established and notified.      Home Health Care:   [facility]  PH: [phone], Fax: [phone]   RN, PT, OT, SLP, PAOLA Barkley, [facility] Acute Rehab   Direct Phone: [phone]  I   Pager: [phone]  I  Fax: [phone]

## 2022-09-30 NOTE — PLAN OF CARE
FOCUS/GOAL  Bowel management, Bladder management, Pain management, Mobility, Skin integrity, and Safety management    ASSESSMENT, INTERVENTIONS AND CONTINUING PLAN FOR GOAL:  Patient is alert and oriented x 4. Denied pain, headache, dizziness, CP or SOB. A-1 ubaldo steady. Regular/thin. Continent of B/B last BM 09/27. Left breast wound dressing changed. VS WDL No care concern at this time. Call light is in reach. Staff will continue to monitor.  Goal Outcome Evaluation:

## 2022-09-30 NOTE — PLAN OF CARE
FOCUS/GOAL  Bowel management, Bladder management, Pain management and Cognition/Memory/Judgment/Problem solving    ASSESSMENT, INTERVENTIONS AND CONTINUING PLAN FOR GOAL:  Pt is alert and oriented x4, denies fever, chills, CP, SOB, N/V, abdominal pain, or new weakness/numbness/tingling, continent of bowel and bladder, transferring with assist of 1 and ww SPT or ubaldo stedy to bathroom, sleeping poorly throughout the night, no tubes, lines or drains, vs stable, dressing to chin from cut during shaving, no further care concerns at this time continue with POC.       Goal Outcome Evaluation: No change

## 2022-09-30 NOTE — PLAN OF CARE
Patient discharged home today, discharge summary and meds reviewed with  patient and  her  and son. Discharge meds given to patient, stable at the time of discharge.    Goal Outcome Evaluation:

## 2022-09-30 NOTE — DISCHARGE SUMMARY
[facility]   Acute Rehabilitation Unit  Discharge summary     Date of Admission: 9/5/2022  Date of Discharge: 9/30/22  Disposition: home   Primary Care Physician: System, Provider Not In  Attending physician: Andrew Marcos DO        discharge diagnosis      Post CVA with L hemiparesis   1. Impaired functional mobility  2. Impaired ADLs  3. Impaired cognition     Patient Active Problem List   Diagnosis Code     Acute ischemic stroke (H) I63.9     Hyperlipidemia E78.5     Hypertension I10     GILDA (obstructive sleep apnea) G47.33     Infarction of right basal ganglia (H) I63.9             brief summary    Stephani Garcia is a 67 year old left hand dominant female with PMHx HTN, HLD, vertigo, GILDA (not using CPAP). Admitted for acute ischemic infarcts with right frontal lobe and right basal ganglia involvement. Has functional deficits of weak L. shoulder shrug, L. Hemiparesis, dysphagia, dysarthria, possbile neurogenic bowel/bladder and cognitive deficits. She also has possible L. Breast CA pending pathology results. She was admitted to undergo therapies with PT/OT/SLP.           rehabilitaiton course    OT:  Current Status:  ADLs:    Mobility: CGA squat pivot both directions, use of quad cane to assist to L initiated on 9/28 per PT. Recommend Ax1 squat pivot with nsg, easiest to right. Ok to sit EOB independently - no alarms.    Grooming: IND with set up/clean up EOS simple g/h and oral cares. A for hair.     Dressing: UB- IND with set up doff/don pull over shirt. Mod A LUE yoana neurexa. LB - Mod A don/doff over hips, SBA thread/unthread. Feet - IND with set up don/doff socks. SBA don RLE shoe with elastic laces. Max A don LLE AFO and shoe.    Bathing: Transfer CGA SPT with gb wc <> ETB. Pt able to wash/dry 10/10 body parts, A to dry backside.    Toileting: Squat pivot with grab rail wc <> commode overlay.  Mod A c/m and total A rear cares.  IADLs: IND PTA.  A at discharge  from family.  Vision/Cognition: L strabismus baseline with pt reporting, pt reports worse vision in L eye at baseline. Functional deficits problem solving, memory, and wordfinding.      Assessment: Completed independence day ADL assessment, see updated status above.     Other Barriers to Discharge (DME, Family Training, etc): 1 stair to enter from garage, then all needs met on main level. Walk in shower with high lip with showerhead on R wall and vinyl inlay (not able to apply stud gbs). Standard height toilet without wall to secure gb.      DME: Recommend ETB and vertical gb at left side of WIS entrance, BSC/OTC, drop arm grab bar at left side of toilet, yoana neurexa, temporary bed rail, bidet, HHSH. Mobility devices per PT. Family provided handouts.      SLP:  Continued therapy is recommended.  Rationale/Recommendations:   Pt demonstrated improvement thus far re: cognition and oropharyngeal swallow function. Recommend continue regular texture, thin (0) liquid diet. Recommend ongoing SLP to continue targeting cognitive impairment.      VFSS 9/20: Pt seen for repeat VFSS on this date. Pt presents with significant improved oropharyngeal swallow function and demonstrated no aspiration across all consistencies. Pt with few instances reduced laryngeal vestibule closure resulting in intermittent flash penetration with mildly thick (2) and large sips thin (0) liquid. Recommend pt continue regular texture, advance to level 0 liquid. straws ok. Ensure upright fully with all PO. Pt continues to depict dry cough not directly following PO or related to PO. Likely lingering COVID impact.      PT:  Goals partially met.  Barriers to achieving goals:   ongoing L-sided strength, sensation, coordination, balance and cognitive impairments..     Bed Mobility: IND  Transfer: CGA squat/stand pivot to the R, min A to the L with NBQC  Gait: up to 20 ft, mod A for trunk and L LE mgmt at R rail with L AFO + WC follow  Stairs: not appropriate  "at this time  PASS on 9/29: 26/36  Bell on 9/29: 19/56     Therapy recommendation(s):    A ramp to enter the home is being installed today. Pt has been issued a L small Eunice Neurexa for adequate shoulder support for standing activities, considering hemiplegic shoulder; along with a L AFO for eventual use with functional ambulation. *Recommend transfers without AFO, as pt demonstrates adequate L ankle stability without inversion/rolling, and notes much greater ease in advancing and turning L LE to pivot, without added weight of the brace. Her son will be purchasing a gait belt and NBQC, and patient to receive her loaner K4 WC from [employer]. Family has been present for training sessions on 9/22 and 9/27 to cover safety with transfers, including in/out of the car, with the plan to continue with home care PT to progress L-sided strength, sensation, coordination, and balance to increase IND with mobility and reduce risk of future falls.        mEDICAL COURSE    # Acute ischemic stroke   # Recent infarcts in the right frontal lobe and right basal ganglia  Noted left-sided partial hemiparesis and mild dysarthria/expressive aphasia at presentation 8/30. MRI confirmed  Acute ischemic stroke, and TPA/ thrombectomy 2/2 unknown last known normal. CTA showed no large vessel occlusion, with only 30% of ICA occlusion. Echo was normal EF 65% on 8/31. No Afib noted with telemetry.  at baseline. BP goal is <140/90 at rest, anticipate increase with therapies.   - Plavix 75 mg and aspirin 81 mg until 11/30   -aspirin 325mg starting 12/1  -  Atorvastatin 80 mg    - PT/OT/SLP     # Dysphagia    Video swallow study on 9/2/22: \"Patient had direct, silent aspiration with mildly thick liquid on first trial and deep penetration with mildly thick liquids on subsequent trials. No aspiration or penetration with moderately thick liquids.   - Continue dysphagia diet with moderately thick fluids  - SLP  - repeat swallow study for " 9/20 - Pt presents with significant improved oropharyngeal swallow function and demonstrated no aspiration across all consistencies. Pt with few instances reduced laryngeal vestibule closure resulting in intermittent flash penetration with mildly thick (2) and large sips thin (0) liquid. Recommend pt continue regular texture, advance to level 0 liquid. straws ok. Ensure upright fully with all PO.     # Left Breast Ductal Carcinoma   FNA 9/2. There is a high suspicion for breast CA. Surgery rec'd neoadjuvent treatment with hopes of shrinking tumor prior to surgery. Unable to surgically operate due to recent stroke  - Needs referral to oncology for outpatient follow-up.  --9/7 biopsy results: ductal carcinoma and estrogen and progesterone positive.     - Follow up path results per [facility]  - WOC consult: appreciate recs 9/6   -Letrozole 2.5 mg daily     #UTI - Resolved  -urine from 9/19 showed WBCs, leukocyte esterase, mucus  -Pt reports abdominal pain, dysuria, similar to previous UTIs  -Macrobid BID for 5 days, ended 9/24     # Hyperlipidemia    at baseline.  - Atorvastatin 80 mg      # Hypertension  Goal BP <140/90 at rest.  - Lisinopril 30 mg daily  - Amlodipine 10 mg Daily 9/15 - adjusted to 5 mg given dizziness  - Hydralazine 10 mg PRN for SBP over 170     # Neurogenic Bladder  - Timed voiding  - purewick HS     # Bowel   - Miralax daily, senokot BID  - PRN bisacodyl suppository     # Sleep   - Melatonin 5 mg at bedtime  - Trazodone 25 mg at bedtime     # Asymptomatic COVID 19 Infection - Resolved  Positive Covid test on 8/3 with preceding cold symptoms for 3 days prior. Currently she continues remain asymptomatic from COVID symptoms on 9/5. She has no cough, wheezing, SOB, fever, chills, body aches or any type of cold symptoms.  She has completed remdesivir x 3 days.  Off of quarantine     #GILDA (obstructive sleep apnea):  Patient does not use CPAP at baseline. May need repeat sleep study and eval  by sleep medicine team.   - encourage using CPAP                dISCHARGE MEDICATIONS  Discharge Medication List as of 9/30/2022 10:25 AM      START taking these medications    Details   amLODIPine (NORVASC) 5 MG tablet Take 1 tablet (5 mg) by mouth daily for 30 days, Disp-30 tablet, R-0, E-Prescribe      !! letrozole (FEMARA) 2.5 MG tablet Take 1 tablet (2.5 mg) by mouth At Bedtime for 30 days, Disp-30 tablet, R-0, E-Prescribe      !! letrozole (FEMARA) 2.5 MG tablet Take 1 tablet (2.5 mg) by mouth daily, Disp-30 tablet, R-1, E-Prescribe      melatonin 5 MG tablet Take 1 tablet (5 mg) by mouth At Bedtime for 30 days, Disp-30 tablet, R-0, E-Prescribe      pantoprazole (PROTONIX) 40 MG EC tablet Take 1 tablet (40 mg) by mouth every morning (before breakfast) for 30 days, Disp-30 tablet, R-0, E-Prescribe      traZODone (DESYREL) 50 MG tablet Take 0.5 tablets (25 mg) by mouth nightly as needed for sleep, Disp-30 tablet, R-0, E-Prescribe       !! - Potential duplicate medications found. Please discuss with provider.      CONTINUE these medications which have CHANGED    Details   aspirin (ASA) 81 MG EC tablet Take 1 tablet (81 mg) by mouth daily for 62 days, Disp-62 tablet, R-0, E-PrescribeSwitch to 325 mg after the 62 days      atorvastatin (LIPITOR) 80 MG tablet Take 1 tablet (80 mg) by mouth every evening for 30 days, Disp-30 tablet, R-0, E-Prescribe      clopidogrel (PLAVIX) 75 MG tablet Take 1 tablet (75 mg) by mouth daily for 62 days, Disp-62 tablet, R-0, E-Prescribe      lisinopril (ZESTRIL) 30 MG tablet Take 1 tablet (30 mg) by mouth At Bedtime for 30 days, Disp-30 tablet, R-0, E-Prescribe               DISCHARGE INSTRUCTIONS AND FOLLOW UP  Discharge Procedure Orders   Breast Provider [place] Referral   Standing Status: Future   Referral Priority: Routine: Next available opening Referral Type: Consultation   Number of Visits Requested: 1     Home Care Referral   Referral Priority: Routine: Next available  opening Referral Type: Home Health Therapies & Aides   Number of Visits Requested: 1     Reason for your hospital stay   Order Comments: Post CVA     Activity   Order Comments: Your activity upon discharge: activity as tolerated and no driving     Order Specific Question Answer Comments   Is discharge order? Yes      Adult [facility] Follow-up and recommended labs and tests   Order Comments: Follow up with primary care provider, PCP  within 7 days for hospital follow- up.      Appointments on [place] and/or [facility] (with [facility] or [facility] provider or service). Call [phone] if you haven't heard regarding these appointments within 7 days of discharge.     Diet   Order Comments: Follow this diet upon discharge: Orders Placed This Encounter      Room Service      Snacks/Supplements Adult: Magic Cup; With Meals      Combination Diet Regular Diet; Thin Liquids (level 0)     Order Specific Question Answer Comments   Is discharge order? Yes      Check Out Appointment Request   Standing Status: Future Standing Exp. Date: 09/19/23   Order Comments: - PET/CT - week of 10/3  - Next available visit with breast oncologist     Order Specific Question Answer Comments   Patient current location for scheduling PT prefers a call to schedule pt in ARU   Reason for follow up Return visit with MD and Imaging    Follow-up visit type In person visit           physical examination    Most recent Vital Signs:   Vitals:    09/29/22 0802 09/29/22 1606 09/29/22 2034 09/30/22 0900   BP: 127/76 121/72 117/78 (!) 145/76   BP Location: Right arm Right arm Right arm Right arm   Patient Position: Sitting  Supine Supine   Cuff Size: Adult Regular  Adult Regular Adult Regular   Pulse: 106 97 81 99   Resp: 16 16  16   Temp: (!) 95.7  F (35.4  C) (!) 96.2  F (35.7  C)  (!) 96  F (35.6  C)   TempSrc: Oral Oral  Oral   SpO2: 97% 96%  96%   Weight:       Height:             General: No acute distress, resting in bed   HEENT: NC/NT, Moist mucous  membranes  Pulmonary: Breathing comfortably on room air, clear to auscultation bilaterally  Cardiovascular: Regular rate and rhythm, 2+ radial pulse  Abdominal: Non-tender, non-distended   Extremities: warm, well perfused, no edema in bilateral lower extremities  Neuro/MSK: Alert and oriented, hearing intact to conversation, moving right upper extremity greater than antigravity, has some trace hand and finger         Discharge summary was forwarded to System, Provider Not In (PCP) at the time of discharge, so as to bridge from hospital to outpatient care.     It was our pleasure to care for Stephani Garcia during this hospitalization. Please do not hesitate to contact me should there be questions regarding the hospital course or discharge plan.        Andrew Marcos DO  Physical Medicine & Rehabilitation      I, Andrew Marcos DO, saw and evaluated this patient prior to discharge. 35 minutes spent in discharge, including >50% in counseling and coordination of care, medication review and plan of care recommended on follow up.

## 2022-10-03 ENCOUNTER — PATIENT OUTREACH (OUTPATIENT)
Dept: CARE COORDINATION | Facility: CLINIC | Age: 67
End: 2022-10-03

## 2022-10-03 NOTE — PROGRESS NOTES
[facility] Resource Center Contact  [facility]/Voicemail     Clinical Data: Transitional Care Management Outreach     Outreach attempted x 2.  Left message on patient's voicemail, providing [facility]'s 24/7 scheduling and nurse triage phone number [phone] ([phone]) for questions/concerns and/or to schedule an appt with an [facility] provider, if they do not have a PCP.      Plan:  [facility] will do no further outreaches at this time.       Naomi Villatoro  Community Health Worker  [facility], [facility]  [phone]      *Connected Care Resource Team does NOT follow patient ongoing. Referrals are identified based on internal discharge reports and the outreach is to ensure patient has an understanding of their discharge instructions.

## 2022-10-05 ENCOUNTER — ONCOLOGY VISIT (OUTPATIENT)
Dept: ONCOLOGY | Facility: HOSPITAL | Age: 67
End: 2022-10-05
Attending: PHYSICIAN ASSISTANT
Payer: COMMERCIAL

## 2022-10-05 VITALS
DIASTOLIC BLOOD PRESSURE: 74 MMHG | SYSTOLIC BLOOD PRESSURE: 124 MMHG | OXYGEN SATURATION: 99 % | HEART RATE: 109 BPM | TEMPERATURE: 97.4 F | RESPIRATION RATE: 14 BRPM

## 2022-10-05 DIAGNOSIS — C50.912 MALIGNANT NEOPLASM OF LEFT BREAST IN FEMALE, ESTROGEN RECEPTOR POSITIVE, UNSPECIFIED SITE OF BREAST (H): ICD-10-CM

## 2022-10-05 DIAGNOSIS — Z17.0 MALIGNANT NEOPLASM OF LEFT BREAST IN FEMALE, ESTROGEN RECEPTOR POSITIVE, UNSPECIFIED SITE OF BREAST (H): ICD-10-CM

## 2022-10-05 DIAGNOSIS — C50.812 CANCER OF MIDLINE OF FEMALE BREAST, LEFT (H): Primary | ICD-10-CM

## 2022-10-05 PROCEDURE — 99205 OFFICE O/P NEW HI 60 MIN: CPT | Performed by: INTERNAL MEDICINE

## 2022-10-05 PROCEDURE — G0463 HOSPITAL OUTPT CLINIC VISIT: HCPCS

## 2022-10-05 ASSESSMENT — PAIN SCALES - GENERAL: PAINLEVEL: NO PAIN (0)

## 2022-10-05 NOTE — PROGRESS NOTES
"Oncology Rooming Note    October 5, 2022 1:09 PM   tSephani Garcia is a 67 year old female who presents for:    Chief Complaint   Patient presents with     Oncology Clinic Visit     New patient consult related to Malignant neoplasm of left breast in female, estrogen receptor positive, unspecified site of breast      Initial Vitals: /74 (BP Location: Right arm, Patient Position: Sitting, Cuff Size: Adult Regular)   Pulse 109   Temp 97.4  F (36.3  C) (Tympanic)   Resp 14   SpO2 99%  Estimated body mass index is 26.16 kg/m  as calculated from the following:    Height as of 9/5/22: 1.702 m (5' 7\").    Weight as of 9/26/22: 75.8 kg (167 lb). There is no height or weight on file to calculate BSA.  No Pain (0) Comment: Data Unavailable   No LMP recorded.  Allergies reviewed: Yes  Medications reviewed: Yes    Medications: Medication refills not needed today.  Pharmacy name entered into [facility]: [id number] - [place], [place] - [facility] FAT[address] GARNER    Clinical concerns: New patient consult related to Malignant neoplasm of left breast in female, estrogen receptor positive, unspecified site of breast         GERA MALDONADO CMA            "

## 2022-10-05 NOTE — LETTER
"    10/5/2022         RE: Stephani Garcia  [address]  [place] [address]        Dear Colleague,    Thank you for referring your patient, Stephani Garcia, to the [facility]. Please see a copy of my visit note below.    Oncology Rooming Note    October 5, 2022 1:09 PM   Stephani Garcia is a 67 year old female who presents for:    Chief Complaint   Patient presents with     Oncology Clinic Visit     New patient consult related to Malignant neoplasm of left breast in female, estrogen receptor positive, unspecified site of breast      Initial Vitals: /74 (BP Location: Right arm, Patient Position: Sitting, Cuff Size: Adult Regular)   Pulse 109   Temp 97.4  F (36.3  C) (Tympanic)   Resp 14   SpO2 99%  Estimated body mass index is 26.16 kg/m  as calculated from the following:    Height as of 9/5/22: 1.702 m (5' 7\").    Weight as of 9/26/22: 75.8 kg (167 lb). There is no height or weight on file to calculate BSA.  No Pain (0) Comment: Data Unavailable   No LMP recorded.  Allergies reviewed: Yes  Medications reviewed: Yes    Medications: Medication refills not needed today.  Pharmacy name entered into [facility]: [id number] [place] [place] [facility] [address] [facility] [facility]     Clinical concerns: New patient consult related to Malignant neoplasm of left breast in female, estrogen receptor positive, unspecified site of breast         GERA MALDONADO CMA              Core biopsy [facility] Hematology and Oncology Consult Note      Patient: Stephani Garcia  MRN: [id number]  Date of Service: Oct 5, 2022      We have been asked by Dr. Huggins to evaluate Stephani Garcia for breast cancer.    Assessment/Plan:    1.  Breast cancer: Clearly clinically this is an invasive breast cancer.  However her initial biopsy did not show any invasive component and therefore HER2 testing other molecular testing can be done on it.  Estrogen receptor was positive.  Therefore we will get a core " biopsy to run molecular's and HER2 testing.  We will also get a staging CT scan.  It was thought that she currently is not a candidate for surgery.  She is currently on Faslodex and has been on this for about 3 weeks.  Seems to be tolerating it well so far.  The plan was reviewed with the patient, her  and son.  If she appears to have locally advanced disease then we may consider adding some palliative radiation to alleviate some symptoms such as pain and bleeding as well as hopefully making surgery easier in the future.  If she has evidence of metastatic disease on her imaging then we would likely add a CDK 4/6 inhibitor.  Other treatment modifications would made pending HER2 status results.  We will see her back in a few week after biopsy results are back and imaging are done to make a more definitive plan going forward and to discuss prognosis.  Ultimately, he would be nice if we could get him to surgery to 3 months.  They had an opportunity to have their questions answered.    2.  Wound care: She has a home nurse coming up next week who has changed her dressing once.  We will try to get wound care nursing to come out to make better recommendations on how to manage her exophytic tumor.    3.  Nausea: We will give her prescription for Zofran to take as needed.    ECOG Performance  2    Staging History:    Cancer Staging  No matching staging information was found for the patient.    History:    Stephani comes in today for a follow-up visit regarding her locally advanced breast cancer.  The patient was initially seen in the hospital by Dr. Flowers when she presented with a stroke.  She was then seen by oncology at [place] when she was there for inpatient rehab.  She has not been on for 5 days.  She notes that her appetite is slowly improving but is still not very good.  She still has weakness and limited mobility of the left arm and leg.  She is not able to ambulate.  She can stand for short periods of time.   "She notes that her left breast mass is not bleeding much and is not too painful for her.  Denies nausea or vomiting, hot flashes, sweats, or musculoskeletal symptoms.    Past History:    Past Medical History:   Diagnosis Date     Hyperlipidemia 12/7/2009     Hypertension 12/7/2009     Infection due to 2019 novel coronavirus 8/30/2022     GILDA (obstructive sleep apnea) 9/7/2011    Formatting of this note might be different from the original. Split-Night Polysomnogram Interpretation  Date of read:  9/7/2011  Estimated Body mass index is 26.94 kg/(m^2) as calculated from the following:   Height as of 8/19/11: 5' 7\"(1.702 m).   Weight as of 8/19/11: 172 lb(78.019 kg). Total Nome Score: (not recorded) Neck Circumference (in inches): 13   Baseline: This study was performed in    Family History   Problem Relation Age of Onset     Hyperlipidemia Mother      Lung Cancer Mother      Cataracts Father      Lung Cancer Father       [unfilled] Social History     Socioeconomic History     Marital status:      Spouse name: Not on file     Number of children: Not on file     Years of education: Not on file     Highest education level: Not on file   Occupational History     Not on file   Tobacco Use     Smoking status: Never Smoker     Smokeless tobacco: Never Used   Vaping Use     Vaping Use: Never used   Substance and Sexual Activity     Alcohol use: Not Currently     Alcohol/week: 1.0 standard drink     Types: 1 Glasses of wine per week     Comment: occasional     Drug use: Never     Sexual activity: Not on file   Other Topics Concern     Not on file   Social History Narrative     Not on file     Social Determinants of Health     Financial Resource Strain: Not on file   Food Insecurity: Not on file   Transportation Needs: Not on file   Physical Activity: Not on file   Stress: Not on file   Social Connections: Not on file   Intimate Partner Violence: Not on file   Housing Stability: Not on file        Allergies:    Allergies "   Allergen Reactions     Sulfa Drugs Headache     Tetracyclines Hives     Occurred many years ago.      Review of Systems:    As above in the history.     Review of Systems otherwise Negative for:  General: chills, fever or night sweats  Psychological: anxiety or depression  Ophthalmic: blurry vision, double vision or loss of vision, vision change  ENT: epistaxis, oral lesions, hearing changes  Hematological and Lymphatic: bleeding, bruising, jaundice, swollen lymph nodes  Endocrine: hot flashes, unexpected weight changes  Respiratory: cough, hemoptysis, orthopnea or shortness of breath/ENGLE  Cardiovascular: chest pain, edema, palpitations or PND  Gastrointestinal: abdominal pain, blood in stools, change in bowel habits, diarrhea or nausea/vomiting  Genito-Urinary: change in urinary stream, incontinence, frequency/urgency  Musculoskeletal: joint pain, stiffness, swelling, muscle pain  Neurological: dizziness, headaches, numbness/tingling  Dermatological:    Physical Exam:    /74 (BP Location: Right arm, Patient Position: Sitting, Cuff Size: Adult Regular)   Pulse 109   Temp 97.4  F (36.3  C) (Tympanic)   Resp 14   SpO2 99%     General: patient appears stated age of 67 year old. Nontoxic and in no distress.   HEENT: Head: atraumatic, normocephalic. Sclerae anicteric.  Chest:  Normal respiratory effort  Cardiac:  No edema.   Abdomen: abdomen is non-distended  Extremities: normal tone and muscle bulk.   Skin: Large exophytic tumor on the right breast.  Some red blood on the dressing.  CNS: alert and oriented. Grossly non-focal.   Psychiatric: normal mood and affect.     Lab Results:    No results found for this or any previous visit (from the past 168 hour(s)).    Imaging Results:    XR Video Swallow with SLP or OT    Result Date: 9/20/2022  Examination:  Modified Barium Swallow Study with Speech Pathology, 9/20/2022 Comparison: 9/2/2022 History: Dysphagia, stroke Findings: Under fluoroscopic guidance, the  patient was given orally administered barium of varying consistencies in the presence of the speech pathology service.  The oral phase was normal. Only small amount of residual contrast within the pharynx. One episode of aspiration with mildly thickened barium which elicited a strong cough response. Occasional flash penetration with other consistencies of barium. No aspiration with thin, pudding consistency or barium coated cracker.     Impression: 1. One episode of aspiration with mildly thick barium . 2. Occasional flash penetration with other consistencies barium. No aspiration with thin consistency or pudding/cracker consistency barium. Please see the speech pathologist report for further details regarding the modified barium swallow study portion of the examination. NATHANAEL RUDOLPH DO   SYSTEM ID:  [id number]      Signed by: Marcio France MD        Again, thank you for allowing me to participate in the care of your patient.        Sincerely,        Marcio France MD

## 2022-10-10 ENCOUNTER — TELEPHONE (OUTPATIENT)
Dept: ONCOLOGY | Facility: HOSPITAL | Age: 67
End: 2022-10-10

## 2022-10-10 NOTE — TELEPHONE ENCOUNTER
Patient's son Aleksandar calls in to the nurse triage line today.  He is listed as someone we can give medical information to.  He does have his mom with him in the background.  She confirms her name and date of birth.  He states that her wound site was bleeding this morning.  He states it was in large amounts and they had a hard time stopping it.  They state that it has been stopped but they were concerned that they needed to do something more.  Patient is a current patient of [facility].  I told Aleksandar that I would call [facility] and see if they could come out to them sooner.  The family tells me that currently someone is coming out on Thursday to check on the wound.  I did let Aleksandar know that if the wound starts bleeding again in the have it just is hard of a time getting it to stop bleeding, they do need to seek emergency care.  We need to figure out why she is having so much bleeding and also need to get it to stop.  He verbalized understanding.    I called [facility] and they told me that they opened this patient's care on 10/3.  Patient has a home health aide visit today.  If the home health aide has any concerns today, she will call the .  If the  is unavailable, she will then call [facility] main office.  They would then deal with it.  Patient also has a nurse visit on 10/11 and 10/14 and a wound dressing change on 10/13.  I let them know about the above information and stated that it sounds like they should be covered since I told the son if it restarts bleeding they need to bring her in.  [facility] staff verbalized understanding and was appreciative.    Vilma Champion RN

## 2022-10-10 NOTE — TELEPHONE ENCOUNTER
Patient's son Aleksandar calls in again today and leaves message on the nurse triage line.  He states that he needs to schedule appointments for his mother.  He states that there is some imaging orders as well as biopsy that need to be scheduled.  He was questioning whether or not she needs to come off of her blood thinners prior to the biopsy.  In Dr. France note, it does identify that she may need to stop these, but that will be ultimately up to the interventional radiology department who will be scheduling the biopsy.  I forwarded a message over to the schedulers to please contact the son Aleksandar at [phone] to get the PET scan and follow-up with Dr. France arranged.    Vilma Champion RN

## 2022-10-13 ENCOUNTER — TELEPHONE (OUTPATIENT)
Dept: ONCOLOGY | Facility: HOSPITAL | Age: 67
End: 2022-10-13

## 2022-10-13 NOTE — TELEPHONE ENCOUNTER
[facility]: Cancer Care                                                                                        Our financial team have spoken with Stephani's family and unfortunately she does not have Medicare, only [facility] plan. Unfortunately [facility] and [facility] are not in network for her. She will need to establish care with [facility].     Before cancelling all appointments, I confirm this information with son, Aleksandar. He confirms the above information and indicates that they are actively working with [facility] to get Stephani established with them.    Signature:  Oma Meza RN

## 2022-10-20 ENCOUNTER — TELEPHONE (OUTPATIENT)
Dept: ONCOLOGY | Facility: HOSPITAL | Age: 67
End: 2022-10-20

## 2022-10-20 NOTE — TELEPHONE ENCOUNTER
Call came in today from Helen at the [facility] [place].  She states that she would like to discuss this patient with the RN care coordinator for Dr. France.  He was unavailable at the time so I let her know that I will get the message over to him to give her a call back.  Her direct callback number is [phone].    Vilma Champion RN

## 2023-03-12 ENCOUNTER — HOSPITAL ENCOUNTER (EMERGENCY)
Facility: HOSPITAL | Age: 68
Discharge: HOME | End: 2023-03-12
Attending: EMERGENCY MEDICINE | Admitting: EMERGENCY MEDICINE
Payer: COMMERCIAL

## 2023-03-12 ENCOUNTER — APPOINTMENT (OUTPATIENT)
Dept: RADIOLOGY | Facility: HOSPITAL | Age: 68
End: 2023-03-12
Attending: EMERGENCY MEDICINE
Payer: COMMERCIAL

## 2023-03-12 VITALS
RESPIRATION RATE: 16 BRPM | HEART RATE: 102 BPM | BODY MASS INDEX: 22.76 KG/M2 | SYSTOLIC BLOOD PRESSURE: 134 MMHG | DIASTOLIC BLOOD PRESSURE: 74 MMHG | TEMPERATURE: 98.3 F | WEIGHT: 145 LBS | OXYGEN SATURATION: 98 % | HEIGHT: 67 IN

## 2023-03-12 DIAGNOSIS — N39.0 URINARY TRACT INFECTION WITHOUT HEMATURIA, SITE UNSPECIFIED: ICD-10-CM

## 2023-03-12 LAB
ALBUMIN UR-MCNC: NEGATIVE MG/DL
APPEARANCE UR: CLEAR
BILIRUB UR QL STRIP: NEGATIVE
COLOR UR AUTO: YELLOW
GLUCOSE UR STRIP-MCNC: 150 MG/DL
HGB UR QL STRIP: NEGATIVE
HYALINE CASTS: 1 /LPF
KETONES UR STRIP-MCNC: NEGATIVE MG/DL
LEUKOCYTE ESTERASE UR QL STRIP: ABNORMAL
NITRATE UR QL: NEGATIVE
PH UR STRIP: 6.5 [PH] (ref 5–7)
RBC URINE: 1 /HPF
SP GR UR STRIP: 1.01 (ref 1–1.03)
SQUAMOUS EPITHELIAL: <1 /HPF
UROBILINOGEN UR STRIP-MCNC: <2 MG/DL
WBC URINE: 8 /HPF

## 2023-03-12 PROCEDURE — 72100 X-RAY EXAM L-S SPINE 2/3 VWS: CPT

## 2023-03-12 PROCEDURE — 87086 URINE CULTURE/COLONY COUNT: CPT | Performed by: EMERGENCY MEDICINE

## 2023-03-12 PROCEDURE — 81001 URINALYSIS AUTO W/SCOPE: CPT | Performed by: EMERGENCY MEDICINE

## 2023-03-12 PROCEDURE — 99285 EMERGENCY DEPT VISIT HI MDM: CPT

## 2023-03-12 PROCEDURE — 72072 X-RAY EXAM THORAC SPINE 3VWS: CPT

## 2023-03-12 RX ORDER — CEPHALEXIN 500 MG/1
500 CAPSULE ORAL 2 TIMES DAILY
Qty: 28 CAPSULE | Refills: 0 | Status: SHIPPED | OUTPATIENT
Start: 2023-03-12 | End: 2023-03-12

## 2023-03-12 RX ORDER — CEPHALEXIN 500 MG/1
500 CAPSULE ORAL 2 TIMES DAILY
Qty: 10 CAPSULE | Refills: 0 | Status: SHIPPED | OUTPATIENT
Start: 2023-03-12 | End: 2023-03-17

## 2023-03-12 ASSESSMENT — ACTIVITIES OF DAILY LIVING (ADL): ADLS_ACUITY_SCORE: 39

## 2023-03-13 NOTE — ED PROVIDER NOTES
EMERGENCY DEPARTMENT ENCOUNTER      NAME: Stephani Garcia  AGE: 67 year old female  YOB: 1955  MRN: [id number]  EVALUATION DATE & TIME: 3/12/2023 10:00 PM    PCP: System, Provider Not In    ED PROVIDER: Marcio Tolliver D.O.      Chief Complaint   Patient presents with     Abdominal Pain     Flank Pain       FINAL IMPRESSION:  1. Urinary tract infection without hematuria, site unspecified        ED COURSE & MEDICAL DECISION MAKING:    10:05 PM I met with the patient to gather history and to perform my initial exam. I discussed the plan for care while in the Emergency Department.  11:06 PM I updated patient about her urinalysis results. Patient likely has a urinary tract infection.   11:58 PM I updated patient and her family on UA results. Will give patient a dose of IV rocephin and plan for discharge.    Pertinent Labs & Imaging studies reviewed. (See chart for details)  67 year old female presents to the Emergency Department for evaluation of mid back pain that resolved prior to evaluation in the emergency department.  No pain on my exam.  However due to the description of I did decide that x-ray should be prudent to ensure there is no acute process, as well as a UA to ensure there was no kidney injury.  UA does not show any evidence of blood, but does have minimal signs potential for UTI, therefore I did decide to cover with antibiotics.  X-rays do not show any evidence of fracture, dislocation, or other acute injury.  Therefore at this time I believe we can safely discharge patient home on Keflex and have follow-up as an outpatient with her primary care provider.  Return precautions were discussed.  Of note there is no clinical concern for pyelonephritis or ureterolithiasis based on her symptoms tonight, or other emergent process that would require CT imaging.     Medical Decision Making    History:    Supplemental history from: Documented in chart, if applicable    External Record(s) reviewed:  Documented in chart, if applicable.    Work Up:    Chart documentation includes differential considered and any EKGs or imaging independently interpreted by provider, where specified.    In additional to work up documented, I considered the following work up: Documented in chart, if applicable.    External consultation:    Discussion of management with another provider: Documented in chart, if applicable    Complicating factors:    Care impacted by chronic illness: Cancer/Chemotherapy, Hyperlipidemia, Hypertension and Other: stroke    Care affected by social determinants of health: N/A    Disposition considerations: Discharge. I prescribed additional prescription strength medication(s) as charted. N/A.        At the conclusion of the encounter I discussed the results of all of the tests and the disposition. The questions were answered. The patient or family acknowledged understanding and was agreeable with the care plan.      HPI    Patient information was obtained from: patient    Use of : N/A      Stephani Garcia is a 67 year old female with a pertinent history of acute ischemic stroke, cancer of midline female breast (left), obstructive sleep apna, HTN,and HLD who presents to the emergency department via wheelchair for evaluation of abdominal pain.    Patient reports a mechanical fall yesterday from a sitting position. She fell on her buttocks and hit her back. Denies hitting her head. Earlier today, the patient developed abdominal pain near her kidneys. Patient also noticed that her blood pressure was high. Her BP is normally around 's.  She called [facility] who referred her to come to the ED. Currently, the patient endorses abdominal pain but has somewhat improved since being here. Patient also notes that she had a stroke in 8/22 that resulted with left sided deficits. Denies back pain, fevers, cough, dysuria, and any other symptoms.    REVIEW OF SYSTEMS  Constitutional:  Denies fever,  "chills, weight loss or weakness  Eyes:  No pain, discharge, redness  HENT:  Denies sore throat, ear pain, congestion  Respiratory: No SOB, wheeze or cough  Cardiovascular:  No CP, palpitations  GI:  Denies nausea, vomiting, diarrhea. Positive for abdominal pain.  : Denies dysuria, hematuria  Musculoskeletal:  Denies any new muscle/joint pain, swelling or loss of function.  Skin:  Denies rash, pallor  Neurologic:  Denies headache, focal weakness or sensory changes  Lymph: Denies swollen nodes    All other systems negative unless noted in HPI.    PAST MEDICAL HISTORY:  Past Medical History:   Diagnosis Date     Hyperlipidemia 2009     Hypertension 2009     Infection due to 2019 novel coronavirus 2022     GILDA (obstructive sleep apnea) 2011    Formatting of this note might be different from the original. Split-Night Polysomnogram Interpretation  Date of read:  2011  Estimated Body mass index is 26.94 kg/(m^2) as calculated from the following:   Height as of 11: 5' 7\"(1.702 m).   Weight as of 11: 172 lb(78.019 kg). Total [place] Score: (not recorded) Neck Circumference (in inches): 13   Baseline: This study was performed in       PAST SURGICAL HISTORY:  Past Surgical History:   Procedure Laterality Date      SECTION           CURRENT MEDICATIONS:    No current facility-administered medications for this encounter.     Current Outpatient Medications   Medication     cephALEXin (KEFLEX) 500 MG capsule     amLODIPine (NORVASC) 5 MG tablet     atorvastatin (LIPITOR) 80 MG tablet     clopidogrel (PLAVIX) 75 MG tablet     letrozole (FEMARA) 2.5 MG tablet     lisinopril (ZESTRIL) 30 MG tablet     traZODone (DESYREL) 50 MG tablet         ALLERGIES:  Allergies   Allergen Reactions     Sulfa Drugs Headache     Tetracyclines Hives     Occurred many years ago.        FAMILY HISTORY:  Family History   Problem Relation Age of Onset     Hyperlipidemia Mother      Lung Cancer Mother      " "Cataracts Father      Lung Cancer Father        SOCIAL HISTORY:  Social History     Socioeconomic History     Marital status:    Tobacco Use     Smoking status: Never     Smokeless tobacco: Never   Vaping Use     Vaping Use: Never used   Substance and Sexual Activity     Alcohol use: Not Currently     Alcohol/week: 1.0 standard drink     Types: 1 Glasses of wine per week     Comment: occasional     Drug use: Never       VITALS:  Patient Vitals for the past 24 hrs:   BP Temp Temp src Pulse Resp SpO2 Height Weight   03/12/23 2322 134/74 -- -- 102 16 98 % -- --   03/12/23 2215 (!) 157/86 -- -- 108 -- 98 % -- --   03/12/23 2205 (!) 154/78 -- -- 111 -- 98 % -- --   03/12/23 2111 133/77 98.3  F (36.8  C) Oral 115 22 97 % 1.702 m (5' 7\") 65.8 kg (145 lb)     PHYSICAL EXAM  Vitals: /74   Pulse 102   Temp 98.3  F (36.8  C) (Oral)   Resp 16   Ht 1.702 m (5' 7\")   Wt 65.8 kg (145 lb)   SpO2 98%   BMI 22.71 kg/m      General: Appears in no acute distress, awake, alert, interactive.  Eyes: Conjunctivae non-injected. Sclera anicteric.  HENT: Atraumatic, normocephalic  Neck: Supple, normal ROM  Respiratory/Chest: Respiration unlabored, no tachypnea  Abdomen: non distended.  Musculoskeletal: Normal extremities. No edema or erythema.  Back: no spinal or paraspinal tenderness  Skin: Normal color. No rash or diaphoresis.  Neurologic: Alert and oriented, face symmetric, moves all extremities spontaneously. Speech clear.  Psychiatric:  Affect normal, Judgment normal, Mood normal.        LABS  Results for orders placed or performed during the hospital encounter of 03/12/23 (from the past 24 hour(s))   UA with Microscopic reflex to Culture    Specimen: Urine, Clean Catch   Result Value Ref Range    Color Urine Yellow Colorless, Straw, Light Yellow, Yellow    Appearance Urine Clear Clear    Glucose Urine 150 (A) Negative mg/dL    Bilirubin Urine Negative Negative    Ketones Urine Negative Negative mg/dL    Specific " Gravity Urine 1.015 1.001 - 1.030    Blood Urine Negative Negative    pH Urine 6.5 5.0 - 7.0    Protein Albumin Urine Negative Negative mg/dL    Urobilinogen Urine <2.0 <2.0 mg/dL    Nitrite Urine Negative Negative    Leukocyte Esterase Urine 500 Alexia/uL (A) Negative    RBC Urine 1 <=2 /HPF    WBC Urine 8 (H) <=5 /HPF    Squamous Epithelials Urine <1 <=1 /HPF    Hyaline Casts Urine 1 <=2 /LPF    Narrative    Urine Culture ordered based on laboratory criteria   Lumbar spine XR, 2-3 views    Narrative    EXAM: XR LUMBAR SPINE 2/3 VIEWS, XR THORACIC SPINE 3 VIEWS  LOCATION: [facility]  DATE/TIME: 3/12/2023 10:54 PM    INDICATION: Back injury  COMPARISON: None.      Impression    IMPRESSION:   Thoracic spine: No definite fracture. Mild to moderate degenerative changes. Normal vertebral body heights and alignment. Normal extraspinal structures.    Lumbar spine: No definite fracture. Normal vertebral body heights. Minimal left lumbar curve. Straightening of lumbar lordosis. Mild narrowing of L3-L4 intervertebral space, moderate at L4-L5 and L5-S1. Mild to moderate degenerative changes. Normal   extraspinal structures.   Thoracic spine XR, 3 views    Narrative    EXAM: XR LUMBAR SPINE 2/3 VIEWS, XR THORACIC SPINE 3 VIEWS  LOCATION: [facility]  DATE/TIME: 3/12/2023 10:54 PM    INDICATION: Back injury  COMPARISON: None.      Impression    IMPRESSION:   Thoracic spine: No definite fracture. Mild to moderate degenerative changes. Normal vertebral body heights and alignment. Normal extraspinal structures.    Lumbar spine: No definite fracture. Normal vertebral body heights. Minimal left lumbar curve. Straightening of lumbar lordosis. Mild narrowing of L3-L4 intervertebral space, moderate at L4-L5 and L5-S1. Mild to moderate degenerative changes. Normal   extraspinal structures.         RADIOLOGY  Thoracic spine XR, 3 views   Final Result   IMPRESSION:    Thoracic spine: No  definite fracture. Mild to moderate degenerative changes. Normal vertebral body heights and alignment. Normal extraspinal structures.      Lumbar spine: No definite fracture. Normal vertebral body heights. Minimal left lumbar curve. Straightening of lumbar lordosis. Mild narrowing of L3-L4 intervertebral space, moderate at L4-L5 and L5-S1. Mild to moderate degenerative changes. Normal    extraspinal structures.      Lumbar spine XR, 2-3 views   Final Result   IMPRESSION:    Thoracic spine: No definite fracture. Mild to moderate degenerative changes. Normal vertebral body heights and alignment. Normal extraspinal structures.      Lumbar spine: No definite fracture. Normal vertebral body heights. Minimal left lumbar curve. Straightening of lumbar lordosis. Mild narrowing of L3-L4 intervertebral space, moderate at L4-L5 and L5-S1. Mild to moderate degenerative changes. Normal    extraspinal structures.           I have independently interpreted the above image, no acute fracture of the T or L spine seen on plain film XR. See radiology report for detail.          MEDICATIONS GIVEN IN THE EMERGENCY:  Medications - No data to display    NEW PRESCRIPTIONS STARTED AT TODAY'S ER VISIT  Discharge Medication List as of 3/12/2023 11:11 PM      START taking these medications    Details   cephALEXin (KEFLEX) 500 MG capsule Take 1 capsule (500 mg) by mouth 2 times daily for 5 days, Disp-28 capsule, R-0, Local Print              I, Gillian Christensen, am serving as a scribe to document services personally performed by Marcio Tolliver D.O., based on my observations and the provider's statements to me.  I, Marcio Tolliver D.O., attest that Gillian Christensen is acting in a scribe capacity, has observed my performance of the services and has documented them in accordance with my direction.     Marcio Tolliver D.O.  Emergency Medicine  [facility] EMERGENCY DEPARTMENT  [facility] [facility] [address]  [phone]  Dept:  [phone]       Marcio Tolliver,   03/13/23 0010

## 2023-03-14 LAB — BACTERIA UR CULT: NO GROWTH

## 2023-06-26 NOTE — ED NOTES
Updated provider on Pt's BP trends; provider is aware, will monitor and is ok with Bps running high while assessing conditions   35.6
